# Patient Record
Sex: FEMALE | Race: WHITE | Employment: OTHER | URBAN - METROPOLITAN AREA
[De-identification: names, ages, dates, MRNs, and addresses within clinical notes are randomized per-mention and may not be internally consistent; named-entity substitution may affect disease eponyms.]

---

## 2017-03-08 ENCOUNTER — TELEPHONE (OUTPATIENT)
Dept: FAMILY MEDICINE | Facility: OTHER | Age: 82
End: 2017-03-08

## 2017-03-08 NOTE — TELEPHONE ENCOUNTER
Summary:    Patient is due/failing the following:   FOLLOW UP and PHQ9    Action needed:   Patient needs office visit for follow up. and Patient needs to do PHQ9.    Type of outreach:    Sent letter.    Questions for provider review:    None                                                                                                                                    Talita Montelongo       Chart routed to Care Team .        Panel Management Review      Patient has the following on her problem list:     Depression / Dysthymia review  PHQ-9 SCORE 10/8/2015 9/15/2016 9/29/2016   Total Score - - -   Total Score 11 16 17      Patient is due for:  PHQ9    Hypertension   Last three blood pressure readings:  BP Readings from Last 3 Encounters:   11/29/16 132/70   10/18/16 110/70   09/29/16 126/78     Blood pressure: Passed    HTN Guidelines:  Age 18-59 BP range:  Less than 140/90  Age 60-85 with Diabetes:  Less than 140/90  Age 60-85 without Diabetes:  less than 150/90      Composite cancer screening  Chart review shows that this patient is due/due soon for the following None

## 2017-03-08 NOTE — LETTER
[address]   [facility] [address]  Phone: [phone]  March 8, 2017      Todd Clifford  [address] [address]      Dear Todd,    We care about your health and have reviewed your health plan including your medical conditions, medications, and lab results.  Based on this review, it is recommended that you follow up regarding the following health topic(s):  -Depression    We recommend you take the following action(s):  -schedule a FOLLOWUP APPOINTMENT.  -Complete and return the attached PHQ-9 Form.  If your total score is greater than 9, please schedule a followup appointment.  If you answer Yes to question 9, call your clinic between the hours of 8 to 5.  You may also call the Suicide Hotline at 9-499-154-OBBC ([phone]) any time.     Please call us at the [facility] - [phone] (or use [employer]) to address the above recommendations.     Thank you for trusting [facility] and we appreciate the opportunity to serve you.  We look forward to supporting your healthcare needs in the future.    Healthy Regards,    Your Health Care Team  [facility]

## 2017-04-14 ENCOUNTER — APPOINTMENT (OUTPATIENT)
Dept: GENERAL RADIOLOGY | Facility: CLINIC | Age: 82
End: 2017-04-14
Attending: FAMILY MEDICINE
Payer: MEDICARE

## 2017-04-14 ENCOUNTER — HOSPITAL ENCOUNTER (EMERGENCY)
Facility: CLINIC | Age: 82
Discharge: HOME | End: 2017-04-14
Attending: FAMILY MEDICINE | Admitting: FAMILY MEDICINE
Payer: MEDICARE

## 2017-04-14 VITALS
WEIGHT: 113 LBS | SYSTOLIC BLOOD PRESSURE: 158 MMHG | DIASTOLIC BLOOD PRESSURE: 80 MMHG | HEART RATE: 106 BPM | OXYGEN SATURATION: 94 % | RESPIRATION RATE: 22 BRPM | BODY MASS INDEX: 22.82 KG/M2

## 2017-04-14 DIAGNOSIS — E87.1 CHRONIC HYPONATREMIA: ICD-10-CM

## 2017-04-14 DIAGNOSIS — R60.9 DEPENDENT EDEMA: ICD-10-CM

## 2017-04-14 DIAGNOSIS — E87.1 HYPONATREMIA: ICD-10-CM

## 2017-04-14 DIAGNOSIS — F17.200 TOBACCO DEPENDENCE SYNDROME: ICD-10-CM

## 2017-04-14 DIAGNOSIS — F17.210 CIGARETTE SMOKER: ICD-10-CM

## 2017-04-14 DIAGNOSIS — M54.50 LEFT-SIDED LOW BACK PAIN WITHOUT SCIATICA, UNSPECIFIED CHRONICITY: ICD-10-CM

## 2017-04-14 LAB
ALBUMIN SERPL-MCNC: 3.8 G/DL (ref 3.4–5)
ALP SERPL-CCNC: 155 U/L (ref 40–150)
ALT SERPL W P-5'-P-CCNC: 45 U/L (ref 0–50)
ANION GAP SERPL CALCULATED.3IONS-SCNC: 9 MMOL/L (ref 3–14)
AST SERPL W P-5'-P-CCNC: 33 U/L (ref 0–45)
BASE EXCESS BLDV CALC-SCNC: 2.4 MMOL/L
BASOPHILS # BLD AUTO: 0 10E9/L (ref 0–0.2)
BASOPHILS NFR BLD AUTO: 0.5 %
BILIRUB SERPL-MCNC: 0.4 MG/DL (ref 0.2–1.3)
BUN SERPL-MCNC: 7 MG/DL (ref 7–30)
CALCIUM SERPL-MCNC: 9.2 MG/DL (ref 8.5–10.1)
CHLORIDE SERPL-SCNC: 95 MMOL/L (ref 94–109)
CO2 SERPL-SCNC: 27 MMOL/L (ref 20–32)
CREAT SERPL-MCNC: 0.6 MG/DL (ref 0.52–1.04)
D DIMER PPP FEU-MCNC: 0.5 UG/ML FEU (ref 0–0.5)
DIFFERENTIAL METHOD BLD: NORMAL
EOSINOPHIL # BLD AUTO: 0.1 10E9/L (ref 0–0.7)
EOSINOPHIL NFR BLD AUTO: 1 %
ERYTHROCYTE [DISTWIDTH] IN BLOOD BY AUTOMATED COUNT: 12.6 % (ref 10–15)
GFR SERPL CREATININE-BSD FRML MDRD: ABNORMAL ML/MIN/1.7M2
GLUCOSE SERPL-MCNC: 106 MG/DL (ref 70–99)
HCO3 BLDV-SCNC: 27 MMOL/L (ref 21–28)
HCT VFR BLD AUTO: 39.5 % (ref 35–47)
HGB BLD-MCNC: 13.3 G/DL (ref 11.7–15.7)
IMM GRANULOCYTES # BLD: 0 10E9/L (ref 0–0.4)
IMM GRANULOCYTES NFR BLD: 0.2 %
INR PPP: 0.9 (ref 0.86–1.14)
LIPASE SERPL-CCNC: 98 U/L (ref 73–393)
LYMPHOCYTES # BLD AUTO: 1.3 10E9/L (ref 0.8–5.3)
LYMPHOCYTES NFR BLD AUTO: 21.1 %
MCH RBC QN AUTO: 31.4 PG (ref 26.5–33)
MCHC RBC AUTO-ENTMCNC: 33.7 G/DL (ref 31.5–36.5)
MCV RBC AUTO: 93 FL (ref 78–100)
MONOCYTES # BLD AUTO: 0.7 10E9/L (ref 0–1.3)
MONOCYTES NFR BLD AUTO: 10.9 %
NEUTROPHILS # BLD AUTO: 4 10E9/L (ref 1.6–8.3)
NEUTROPHILS NFR BLD AUTO: 66.3 %
O2/TOTAL GAS SETTING VFR VENT: 21 %
PCO2 BLDV: 39 MM HG (ref 40–50)
PH BLDV: 7.45 PH (ref 7.32–7.43)
PLATELET # BLD AUTO: 319 10E9/L (ref 150–450)
PO2 BLDV: 68 MM HG (ref 25–47)
POTASSIUM SERPL-SCNC: 3.9 MMOL/L (ref 3.4–5.3)
PROT SERPL-MCNC: 7.1 G/DL (ref 6.8–8.8)
RBC # BLD AUTO: 4.23 10E12/L (ref 3.8–5.2)
SODIUM SERPL-SCNC: 131 MMOL/L (ref 133–144)
TROPONIN I SERPL-MCNC: NORMAL UG/L (ref 0–0.04)
TSH SERPL DL<=0.005 MIU/L-ACNC: 0.74 MU/L (ref 0.4–4)
WBC # BLD AUTO: 6.1 10E9/L (ref 4–11)

## 2017-04-14 PROCEDURE — 93005 ELECTROCARDIOGRAM TRACING: CPT | Performed by: FAMILY MEDICINE

## 2017-04-14 PROCEDURE — 85025 COMPLETE CBC W/AUTO DIFF WBC: CPT | Performed by: FAMILY MEDICINE

## 2017-04-14 PROCEDURE — 99284 EMERGENCY DEPT VISIT MOD MDM: CPT | Mod: 25 | Performed by: FAMILY MEDICINE

## 2017-04-14 PROCEDURE — 71020 XR CHEST 2 VW: CPT | Mod: TC

## 2017-04-14 PROCEDURE — 99285 EMERGENCY DEPT VISIT HI MDM: CPT | Mod: 25 | Performed by: FAMILY MEDICINE

## 2017-04-14 PROCEDURE — 83690 ASSAY OF LIPASE: CPT | Performed by: FAMILY MEDICINE

## 2017-04-14 PROCEDURE — 93010 ELECTROCARDIOGRAM REPORT: CPT | Mod: Z6 | Performed by: FAMILY MEDICINE

## 2017-04-14 PROCEDURE — 80053 COMPREHEN METABOLIC PANEL: CPT | Performed by: FAMILY MEDICINE

## 2017-04-14 PROCEDURE — 84443 ASSAY THYROID STIM HORMONE: CPT | Performed by: FAMILY MEDICINE

## 2017-04-14 PROCEDURE — 85379 FIBRIN DEGRADATION QUANT: CPT | Performed by: FAMILY MEDICINE

## 2017-04-14 PROCEDURE — 82803 BLOOD GASES ANY COMBINATION: CPT | Performed by: FAMILY MEDICINE

## 2017-04-14 PROCEDURE — 85610 PROTHROMBIN TIME: CPT | Performed by: FAMILY MEDICINE

## 2017-04-14 PROCEDURE — 84484 ASSAY OF TROPONIN QUANT: CPT | Performed by: FAMILY MEDICINE

## 2017-04-14 RX ORDER — ACETAMINOPHEN 325 MG/1
975 TABLET ORAL ONCE
Status: DISCONTINUED | OUTPATIENT
Start: 2017-04-14 | End: 2017-04-15 | Stop reason: HOSPADM

## 2017-04-14 ASSESSMENT — ENCOUNTER SYMPTOMS
MUSCULOSKELETAL NEGATIVE: 1
NERVOUS/ANXIOUS: 1
PALPITATIONS: 0
FEVER: 0
BLOOD IN STOOL: 0
NEUROLOGICAL NEGATIVE: 1
APPETITE CHANGE: 0
COUGH: 1
CHILLS: 0
DIARRHEA: 0
VOMITING: 0
GASTROINTESTINAL NEGATIVE: 1
ABDOMINAL PAIN: 0
WOUND: 0
WHEEZING: 0
EYES NEGATIVE: 1
STRIDOR: 0
SHORTNESS OF BREATH: 1
ACTIVITY CHANGE: 0
NAUSEA: 0
FATIGUE: 0

## 2017-04-14 NOTE — ED AVS SNAPSHOT
[facility] Emergency Department    [address] [place] [address]    Phone:  [phone]    Fax:  [phone]                                       Todd Clifford   MRN: [id number]    Department:  [facility] Emergency Department   Date of Visit:  4/14/2017           After Visit Summary Signature Page     I have received my discharge instructions, and my questions have been answered. I have discussed any challenges I see with this plan with the nurse or doctor.    ..........................................................................................................................................  Patient/Patient Representative Signature      ..........................................................................................................................................  Patient Representative Print Name and Relationship to Patient    ..................................................               ................................................  Date                                            Time    ..........................................................................................................................................  Reviewed by Signature/Title    ...................................................              ..............................................  Date                                                            Time

## 2017-04-14 NOTE — ED AVS SNAPSHOT
[facility] Emergency Department    [address]     [place] [address]    Phone:  [phone]    Fax:  [phone]                                       Todd Clifford   MRN: [id number]    Department:  [facility] Emergency Department   Date of Visit:  4/14/2017           Patient Information     Date Of Birth          8/30/1934        Your diagnoses for this visit were:     Dependent edema     Chronic hyponatremia     Tobacco dependence syndrome        You were seen by Matry Valenzuela DO.      Follow-up Information     Follow up with Zuly Carrington PA-C In 1 week.    Specialty:  Physician Assistant - Medical    Why:  for recheck    Contact information:    [facility]  [address]  [facility] [address]  [phone]          Follow up with [facility] Emergency Department.    Specialty:  EMERGENCY MEDICINE    Why:  If symptoms worsen    Contact information:    [address]   [place] [address] [phone]    Additional information:    From [address]: Exit at [address] on south side of [place]. Turn right on [id number]. Turn left at stoplight on [address]. [facility] will be in view two blocks ahead        Discharge Instructions       Please read and follow the handout(s) instructions. Return, if needed, for increased or worsening symptoms and as directed by the handout(s).    Elevate your feet for an hour if you note the swelling in the feet return.    It was nice meeting you. I am glad your swelling improved. Please stop smoking!!    Electronically signed, Marty Valenzuela DO      Discharge References/Attachments     COPING WITH EDEMA (ENGLISH)    SMOKING CESSATION (ENGLISH)      24 Hour Appointment Hotline       To make an appointment at any [facility], call [phone] ([phone]). If you don't have a family doctor or clinic, we will help you find one. [facility] are  conveniently located to serve the needs of you and your family.             Review of your medicines      CONTINUE these medicines which may have CHANGED, or have new prescriptions. If we are uncertain of the size of tablets/capsules you have at home, strength may be listed as something that might have changed.        Dose / Directions Last dose taken    amitriptyline 25 MG tablet   Commonly known as:  ELAVIL   What changed:    - how much to take  - how to take this  - when to take this  - additional instructions   Quantity:  90 tablet        Take 1 tablet (25 mg) by mouth in the morning and 2 tablets (50 mg) by mouth in the evening.   Refills:  3          Our records show that you are taking the medicines listed below. If these are incorrect, please call your family doctor or clinic.        Dose / Directions Last dose taken    ALPRAZolam 0.25 MG tablet   Commonly known as:  XANAX   Quantity:  60 tablet        TAKE ONE-HALF TO ONE TABLET BY MOUTH EVERY 6 HOURS AS NEEDED. MAX OF 2.5 PER 24HRS   Refills:  5        lisinopril 20 MG tablet   Commonly known as:  PRINIVIL/ZESTRIL   Dose:  20 mg   Quantity:  90 tablet        Take 1 tablet (20 mg) by mouth daily   Refills:  3        melatonin 5 MG tablet   Commonly known as:  MELATONIN MAXIMUM STRENGTH   Dose:  5 mg   Quantity:  90 tablet        Take 1 tablet (5 mg) by mouth nightly as needed for sleep   Refills:  3        senna 8.6 MG tablet   Commonly known as:  SENOKOT   Dose:  1 tablet   Quantity:  90 tablet        Take 1 tablet by mouth daily   Refills:  0                Procedures and tests performed during your visit     Blood gas venous    CBC with platelets differential    Comprehensive metabolic panel    D dimer quantitative    EKG 12-lead, tracing only    INR    Lipase    TSH with free T4 reflex    Troponin I    XR Chest 2 Views      Orders Needing Specimen Collection     Ordered          04/14/17 1959  UA reflex to Microscopic and Culture - STAT, [facility]: STAT,  "Needs to be Collected     Scheduled Task Status   17 Collect UA reflex to Microscopic and Culture Open   Order Class:  PCU Collect                  Pending Results     Date and Time Order Name Status Description    2017 XR Chest 2 Views Preliminary             Pending Culture Results     No orders found from 2017 to 4/15/2017.            Thank you for choosing [facility]       Thank you for choosing [facility] for your care. Our goal is always to provide you with excellent care. Hearing back from our patients is one way we can continue to improve our services. Please take a few minutes to complete the written survey that you may receive in the mail after you visit with us. Thank you!        [facility] [employer] Information     [facility] lets you send messages to your doctor, view your test results, renew your prescriptions, schedule appointments and more. To sign up, go to [email or web address] . Click on \"Log in\" on the left side of the screen, which will take you to the Welcome page. Then click on \"Sign up Now\" on the right side of the page.     You will be asked to enter the access code listed below, as well as some personal information. Please follow the directions to create your username and password.     Your access code is: [email or web address]  Expires: 2017 10:03 PM     Your access code will  in 90 days. If you need help or a new code, please call your [facility] clinic or [phone].        Care EveryWhere ID     This is your Care EveryWhere ID. This could be used by other organizations to access your [facility] medical records  [id number]        After Visit Summary       This is your record. Keep this with you and show to your community pharmacist(s) and doctor(s) at your next visit.                  "

## 2017-04-15 NOTE — ED PROVIDER NOTES
History     Chief Complaint   Patient presents with     Leg Swelling     Back Pain     HPI  Todd Clifford is a 82 year old female who presents to the ER with concerns about bilateral lower leg swelling. She states that she noted the swelling this afternoon. Her daughter called 911 and she was evaluated by EMS who did not transport her to the ER but told her to come the ER to get checked out as they thought her lungs sounded congested. She states she feels fine other than a strain of her low back that happened about 1 week ago to the right lower back when attempting to move her sewing machine to clean the window behind it. She is a smoker and has been since 18 yrs of age. She denies significant worsening of her chronic SOB and denies chest pain at this time. She denies abdominal pains. She states she has gained about 15 lbs since being told to increase her fluid intake to 4 pints a day and to drink 3 Ensure shakes a day by her clinic provider.       I have reviewed the Medications, Allergies, Past Medical and Surgical History, and Social History in the Epic system.  Patient Active Problem List   Diagnosis     Anxiety     CARDIOVASCULAR SCREENING; LDL GOAL LESS THAN 130     Advanced directives, counseling/discussion     Hypertension goal BP (blood pressure) < 140/80     Mild major depression (H)     Chronic constipation     Psychophysiological insomnia       Past Medical History:   Diagnosis Date     Anxiety      Hearing loss      Hypertension         History reviewed. No pertinent surgical history.    Family History   Problem Relation Age of Onset     C.A.D. Mother      C.A.D. Father      Hypertension Mother      Hypertension Father      Hypertension Brother      DIABETES Daughter        Social History     Social History     Marital status:      Spouse name: N/A     Number of children: N/A     Years of education: N/A     Occupational History     Not on file.     Social History Main Topics     Smoking  "status: Current Every Day Smoker     Packs/day: 1.00     Types: Cigarettes     Smokeless tobacco: Never Used     Alcohol use No     Drug use: No     Sexual activity: Not Currently     Partners: Male     Other Topics Concern     Not on file     Social History Narrative       Current Outpatient Rx   Medication Sig Dispense Refill     amitriptyline (ELAVIL) 25 MG tablet Take 1 tablet (25 mg) by mouth in the morning and 2 tablets (50 mg) by mouth in the evening. (Patient taking differently: Take 25 mg by mouth 2 times daily Take 1 tablet (25 mg) by mouth in the morning and 2 tablets (50 mg) by mouth in the evening.) 90 tablet 3     ALPRAZolam (XANAX) 0.25 MG tablet TAKE ONE-HALF TO ONE TABLET BY MOUTH EVERY 6 HOURS AS NEEDED. MAX OF 2.5 PER 24HRS 60 tablet 5     lisinopril (PRINIVIL,ZESTRIL) 20 MG tablet Take 1 tablet (20 mg) by mouth daily 90 tablet 3     melatonin (MELATONIN MAXIMUM STRENGTH) 5 MG tablet Take 1 tablet (5 mg) by mouth nightly as needed for sleep 90 tablet 3     senna (SENOKOT) 8.6 MG tablet Take 1 tablet by mouth daily 90 tablet 0       Allergies   Allergen Reactions     Prozac [Fluoxetine]      \"roaring in my head\"   nervous         There is no immunization history on file for this patient.    Review of Systems   Constitutional: Negative for activity change, appetite change, chills, fatigue and fever.   HENT: Negative.    Eyes: Negative.    Respiratory: Positive for cough (chronic with smoking) and shortness of breath (Chronic but not worse than usual.). Negative for wheezing and stridor.    Cardiovascular: Positive for leg swelling (Equal bilateral lower leg swelling.). Negative for chest pain and palpitations.   Gastrointestinal: Negative.  Negative for abdominal pain, blood in stool, diarrhea, nausea and vomiting.   Genitourinary: Negative.    Musculoskeletal: Negative.    Skin: Negative for rash and wound.   Neurological: Negative.    Psychiatric/Behavioral: The patient is nervous/anxious (States " she is usually anxiouos about things.).    All other systems reviewed and are negative.      Physical Exam   BP: (!) 172/100  Pulse: 108  Resp: 20  Weight: 51.3 kg (113 lb)  SpO2: 90 %  Physical Exam   Constitutional: She is oriented to person, place, and time. She appears well-developed and well-nourished. No distress.   Smells of cigarette smoke.     HENT:   Head: Normocephalic and atraumatic.   Eyes: Conjunctivae and EOM are normal. Pupils are equal, round, and reactive to light.   Neck: Normal range of motion. Neck supple. No JVD present.   Cardiovascular: Regular rhythm and intact distal pulses.  Frequent extrasystoles are present. Exam reveals distant heart sounds.    No murmur heard.  Pulses:       Radial pulses are 2+ on the right side, and 2+ on the left side.        Dorsalis pedis pulses are 1+ on the right side, and 1+ on the left side.   Bilateral non-pitting lower leg swelling Grade 1-2/4   Pulmonary/Chest: No accessory muscle usage. No respiratory distress. She has decreased breath sounds in the right middle field, the right lower field, the left middle field and the left lower field. She has no wheezes. She has no rhonchi. She has no rales.   Abdominal: Soft. Bowel sounds are normal. She exhibits no ascites and no pulsatile midline mass. There is no tenderness.   Musculoskeletal: Normal range of motion.        Lumbar back: She exhibits tenderness and spasm. She exhibits no bony tenderness and normal pulse.        Back:    Neurological: She is alert and oriented to person, place, and time. She has normal strength. She displays a negative Romberg sign.   Skin: Skin is warm and intact. She is not diaphoretic.   Loss of hair to the lower leg skin bilaterally.    Nursing note and vitals reviewed.      ED Course     ED Course     Procedures             EKG Interpretation:      Interpreted by Marty Valenzuela  Time reviewed: 20:25  Symptoms at time of EKG: Lower leg swelling   Rhythm: sinus  tachycardia  Rate: 100-110  Axis: Normal  Ectopy: none  Conduction: normal  ST Segments/ T Waves: No ST-T wave changes  Q Waves: none  Comparison to prior: No old EKG available    Clinical Impression: sinus tachycardia      Critical Care time:  none               Results for orders placed or performed during the hospital encounter of 04/14/17   XR Chest 2 Views    Narrative    CHEST TWO VIEWS  4/14/2017  9:01 PM      HISTORY: Edema.     COMPARISON: None.    FINDINGS: The heart size is normal. The thoracic aorta is calcified.  There is mild infiltrate in the left upper lobe. The lungs are  otherwise clear. No pneumothorax or pleural effusion. There are 2  midthoracic vertebral body compression fractures.      Impression    IMPRESSION: Mild left upper lobe infiltrate may be pneumonia.    KAREN TREVIZO MD   CBC with platelets differential   Result Value Ref Range    WBC 6.1 4.0 - 11.0 10e9/L    RBC Count 4.23 3.8 - 5.2 10e12/L    Hemoglobin 13.3 11.7 - 15.7 g/dL    Hematocrit 39.5 35.0 - 47.0 %    MCV 93 78 - 100 fl    MCH 31.4 26.5 - 33.0 pg    MCHC 33.7 31.5 - 36.5 g/dL    RDW 12.6 10.0 - 15.0 %    Platelet Count 319 150 - 450 10e9/L    Diff Method Automated Method     % Neutrophils 66.3 %    % Lymphocytes 21.1 %    % Monocytes 10.9 %    % Eosinophils 1.0 %    % Basophils 0.5 %    % Immature Granulocytes 0.2 %    Absolute Neutrophil 4.0 1.6 - 8.3 10e9/L    Absolute Lymphocytes 1.3 0.8 - 5.3 10e9/L    Absolute Monocytes 0.7 0.0 - 1.3 10e9/L    Absolute Eosinophils 0.1 0.0 - 0.7 10e9/L    Absolute Basophils 0.0 0.0 - 0.2 10e9/L    Abs Immature Granulocytes 0.0 0 - 0.4 10e9/L   Comprehensive metabolic panel   Result Value Ref Range    Sodium 131 (L) 133 - 144 mmol/L    Potassium 3.9 3.4 - 5.3 mmol/L    Chloride 95 94 - 109 mmol/L    Carbon Dioxide 27 20 - 32 mmol/L    Anion Gap 9 3 - 14 mmol/L    Glucose 106 (H) 70 - 99 mg/dL    Urea Nitrogen 7 7 - 30 mg/dL    Creatinine 0.60 0.52 - 1.04 mg/dL    GFR Estimate >90  Non   GFR Calc   >60 mL/min/1.7m2    GFR Estimate If Black >90   GFR Calc   >60 mL/min/1.7m2    Calcium 9.2 8.5 - 10.1 mg/dL    Bilirubin Total 0.4 0.2 - 1.3 mg/dL    Albumin 3.8 3.4 - 5.0 g/dL    Protein Total 7.1 6.8 - 8.8 g/dL    Alkaline Phosphatase 155 (H) 40 - 150 U/L    ALT 45 0 - 50 U/L    AST 33 0 - 45 U/L   Lipase   Result Value Ref Range    Lipase 98 73 - 393 U/L   TSH with free T4 reflex   Result Value Ref Range    TSH 0.74 0.40 - 4.00 mU/L   Blood gas venous   Result Value Ref Range    Ph Venous 7.45 (H) 7.32 - 7.43 pH    PCO2 Venous 39 (L) 40 - 50 mm Hg    PO2 Venous 68 (H) 25 - 47 mm Hg    Bicarbonate Venous 27 21 - 28 mmol/L    Base Excess Venous 2.4 mmol/L    FIO2 21    Troponin I   Result Value Ref Range    Troponin I ES  0.000 - 0.045 ug/L     <0.015  The 99th percentile for upper reference range is 0.045 ug/L.  Troponin values in   the range of 0.045 - 0.120 ug/L may be associated with risks of adverse   clinical events.     D dimer quantitative   Result Value Ref Range    D Dimer 0.5 0.0 - 0.50 ug/ml FEU   INR   Result Value Ref Range    INR 0.90 0.86 - 1.14     Medications ordered to be administered in the ER:    Medications - No data to display      Assessments & Plan (with Medical Decision Making)  82-year-old female to the emergency room with concerns about swelling in her lower legs bilaterally.  Patient known to be hypertensive and with a history for fall 1 week ago causing pain to her right buttock region.  Physical exam revealed no evidence suggestive of a DVT.  She had excellent pulses in the dorsalis pedis and posterior tibial areas bilaterally.  There is no erythema associated with her swelling.  Patient had her legs elevated to the ER stay and actually had resolution of her swelling symptoms on reexamination.  Screening metabolic testing was performed and improved remarkable for hyponatremia however this appeared to be a chronic nature and not acute.  EKG  and d-dimer unremarkable for abnormality.  Patient was feeling improved and desired to be discharged home.  When she got up off the cart he had's tenderness in her right buttock region this was treated with some acetaminophen.  She is instructed the use of ice therapy and gentle stretching to the buttock region as well as instructed to elevate her legs at times should she notice any swelling.  To avoid long periods of standing or sitting.  Swelling tonight appears to be secondary to dependent edema.       I have reviewed the nursing notes.    I have reviewed the findings, diagnosis, plan and need for follow up with the patient's daughter.    Discharge Medication List as of 4/14/2017 10:04 PM             I verbally discussed the findings of the evaluation today in the ER. I have verbally discussed with Todd the suggested treatment(s) as described in the discharge instructions and handouts. I have prescribed the above listed medications and instructed her on appropriate use of these medications.      I have verbally suggested she follow-up in her clinic or return to the ER for increased symptoms. See the follow-up recommendations documented  in the after visit summary in this visit's EPIC chart.      Final diagnoses:   Dependent edema   Chronic hyponatremia   Tobacco dependence syndrome       4/14/2017   [facility] EMERGENCY DEPARTMENT     Marty Valenzuela,   04/15/17 0319

## 2017-04-15 NOTE — DISCHARGE INSTRUCTIONS
Please read and follow the handout(s) instructions. Return, if needed, for increased or worsening symptoms and as directed by the handout(s).    Elevate your feet for an hour if you note the swelling in the feet return.    It was nice meeting you. I am glad your swelling improved. Please stop smoking!!    Electronically signed, Marty Valenzuela DO

## 2017-04-15 NOTE — ED NOTES
Pt given discharge instructions.  Nurse stood pt up and started ambulating.  Pt states left sided back and leg pain worse.  Would like some tylenol before leaving.  Md aware.

## 2017-04-15 NOTE — ED NOTES
"Pt denies being able to provide urine sample.  States \"I went 9 times in a row, and now wouldn't ya know I cant go\".  Pt given hot pack for lower back, upper buttock area.  States \"Gosh that feels good\".   "

## 2017-04-20 ENCOUNTER — TELEPHONE (OUTPATIENT)
Dept: FAMILY MEDICINE | Facility: OTHER | Age: 82
End: 2017-04-20

## 2017-04-20 DIAGNOSIS — I10 HYPERTENSION GOAL BP (BLOOD PRESSURE) < 140/80: ICD-10-CM

## 2017-04-20 RX ORDER — LISINOPRIL 20 MG/1
20 TABLET ORAL DAILY
Qty: 90 TABLET | Refills: 3 | Status: CANCELLED | OUTPATIENT
Start: 2017-04-20

## 2017-04-20 NOTE — TELEPHONE ENCOUNTER
lisinopril (PRINIVIL,ZESTRIL) 20 MG tablet      Last Written Prescription Date: 9/15/16  Last Fill Quantity: 90, # refills: 3  Last Office Visit with G, P or [facility] prescribing provider: 11/29/16       Potassium   Date Value Ref Range Status   04/14/2017 3.9 3.4 - 5.3 mmol/L Final     Creatinine   Date Value Ref Range Status   04/14/2017 0.60 0.52 - 1.04 mg/dL Final     BP Readings from Last 3 Encounters:   04/14/17 158/80   11/29/16 132/70   10/18/16 110/70

## 2017-04-20 NOTE — TELEPHONE ENCOUNTER
Reason for call:  Symptom  Reason for call:  Patient reporting a symptom    Symptom or request: bilateral foot swelling    Duration (how long have symptoms been present): since last sunday    Have you been treated for this before? Yes    Additional comments: pt was seen in the emergency room and had clear lunf and foot xrays.  She states she has no other sx but the swelling but is concerned that it hasnt resolved.  She would like to know what fidel thinks she should do.  Please call to advise.  steven    Phone Number patient can be reached at:  Home number on file [phone] (home)    Best Time:  any    Can we leave a detailed message on this number:  YES    Call taken on 4/20/2017 at 4:26 PM by Jenae Dominguez

## 2017-04-20 NOTE — TELEPHONE ENCOUNTER
Todd Clifford is a 82 year old female    S-(situation): Todd is calling today with back pain    B-(background): seen in ED, leg swelling. Low back pain. Negative testing. Blood pressure has been high.     A-(assessment): feet swelling, both. Gets better with elevation. Cough present on phone. Patient does not want to be seen. Refusing to be seen in clinic    Pain scale (1-10): 0/10   Denies: Sob, chest pain, fever, hot to touch, redness   Meds/MeasuresTried: n/a   Improves/Worsens: Elevation helps       R-(recommendations): See in 24 hours - patient is refusing to be seen. Notified in order to assess her, she needs an appointment. States she will call back.   Will comply with recommendation: YES     If further questions/concerns or if Sxs do not improve, worsen or new Sxs develop, call your PCP or [facility] as soon as possible.    Guideline used:  Telephone Triage Protocols for Nurses, Fourth Edition, Alivia Hoskins  Leg pain/swelling  Foot problems    NOTES:  Disposition was determined by the first positive assessment question, therefore all previous assessment questions were negative      Jolynn Sylvester, RN, BSN

## 2017-04-24 NOTE — PROGRESS NOTES
SUBJECTIVE:                                                    Todd Clifford is a 82 year old female who presents to clinic today for the following health issues:      HPI     ED/UC Followup:    Facility: [facility]   Date of visit: 4/14/17  Reason for visit: Bilateral leg swelling   Current Status: Mildly improved     Pt also states having back and hip pain, then the legs started to swell  Right leg  walking is painful, but sitting does not bother leg/hip  Cant lay down has to sit up to sleep  Taking tylenol, would like to know if she can take something else.     - Sitting on hard chair helps  - Sitting of sofa hurts  - >10 feet if walking, hurts   - Elavil - 1 in am and 1 in pm, 1/2 xanax in am and 1 in PM   - Decreased because tired all the time   - Went to move a rug to clean and felt something pull     Hurts all the way to her foot with certain movements          ED Discharge note:   82-year-old female to the emergency room with concerns about swelling in her lower legs bilaterally. Patient known to be hypertensive and with a history for fall 1 week ago causing pain to her right buttock region. Physical exam revealed no evidence suggestive of a DVT. She had excellent pulses in the dorsalis pedis and posterior tibial areas bilaterally. There is no erythema associated with her swelling. Patient had her legs elevated to the ER stay and actually had resolution of her swelling symptoms on reexamination. Screening metabolic testing was performed and improved remarkable for hyponatremia however this appeared to be a chronic nature and not acute. EKG and d-dimer unremarkable for abnormality. Patient was feeling improved and desired to be discharged home. When she got up off the cart he had's tenderness in her right buttock region this was treated with some acetaminophen. She is instructed the use of ice therapy and gentle stretching to the buttock region as well as instructed to elevate her legs at  times should she notice any swelling. To avoid long periods of standing or sitting. Swelling tonight appears to be secondary to dependent edema.       Problem list and histories reviewed & adjusted, as indicated.  Additional history: as documented    BP Readings from Last 3 Encounters:   04/14/17 158/80   11/29/16 132/70   10/18/16 110/70    Wt Readings from Last 3 Encounters:   04/14/17 113 lb (51.3 kg)   11/29/16 101 lb 9.6 oz (46.1 kg)   09/29/16 100 lb 6.4 oz (45.5 kg)            Labs reviewed in EPIC    ROS:  Constitutional, HEENT, cardiovascular, pulmonary, gi and gu systems are negative, except as otherwise noted.    OBJECTIVE:                                                    BP (!) 171/92 (BP Location: Left arm, Patient Position: Chair, Cuff Size: Adult Regular)  Pulse 109  Temp 98.5  F (36.9  C) (Oral)  Resp 16  Wt 109 lb (49.4 kg)  SpO2 94%  BMI 22.02 kg/m2  Body mass index is 22.02 kg/(m^2).  GENERAL APPEARANCE: healthy, alert and no distress  EYES: Eyes grossly normal to inspection, PERRLA, conjunctivae and sclerae without injection or discharge, EOM intact   RESP: Lungs clear to auscultation - no rales, rhonchi or wheezes   CV: Regular rates and rhythm, normal S1 S2, no S3 or S4, no murmur, click or rub, no peripheral edema and peripheral pulses strong and symmetric bilaterally   MS: No musculoskeletal defects are noted and gait is age appropriate without ataxia      Right hip: Normal ROM, non-tender, no edema, CMS intact, normal sensory exam, normal strength      Right knee: Normal ROM, non-tender, no edema, CMS intact, normal sensory exam, normal strength  SKIN: No suspicious lesions or rashes, hydration status appears adeuqate with normal skin turgor   NEURO: Strength 5+ bilateral lower extremities, sensation intact in distal bilateral lower extremities, mentation- intact, speech- normal,   BACK: No CVA tenderness, mild right paralumbar tenderness with spasm in right glute, no midline  tenderness, normal ROM, positive straight leg raise at ~30 degrees   PSYCH: Alert and oriented x3; speech- coherent , normal rate and volume; able to articulate logical thoughts, able to abstract reason, no tangential thoughts, no hallucinations or delusions, mentation appears normal, Mood is euthymic. Affect is appropriate for this mood state and bright. Thought content is free of suicidal ideation, hallucinations, and delusions. Dress is adequate and upkept. Eye contact is good during conversation.       Diagnostic Test Results:  none      ASSESSMENT/PLAN:                                                        ICD-10-CM    1. Acute bilateral low back pain with right-sided sciatica M54.41 naproxen (NAPROSYN) 500 MG tablet     cyclobenzaprine (FLEXERIL) 5 MG tablet     DISCONTINUED: naproxen (NAPROSYN) 500 MG tablet     DISCONTINUED: cyclobenzaprine (FLEXERIL) 5 MG tablet   2. Hypertension goal BP (blood pressure) < 140/80 I10 lisinopril (PRINIVIL/ZESTRIL) 20 MG tablet   3. Psychophysiological insomnia F51.04 amitriptyline (ELAVIL) 25 MG tablet   4. Major depressive disorder, recurrent episode, mild (H) F33.0 amitriptyline (ELAVIL) 25 MG tablet   5. Anxiety F41.9 amitriptyline (ELAVIL) 25 MG tablet     - Symptoms originating in back, discussed sciatic pain   - Discussed conservative care with stretches, NSAIDs, Flexeril, and heat      Hand out given   - Discussed naproxen use and side effects  - Discussed flexeril use and side effects, start with 1/2 tablet will cause sedation, try to avoid use of XANAX with this medication, take first and then if still can't sleep in 1 hour could try adding her 1/2 tablet Xanax   - Discussed if doesn't improve in 2-4 weeks, need to consider physical therapy   - Avoid sitting too long, move around and stretch     - Refilled rest of medications as needed     The patient indicates understanding of these issues and agrees with the plan.    Follow up: MULUGETA OSPINA  TELMA Carrington  [facility]

## 2017-04-27 ENCOUNTER — OFFICE VISIT (OUTPATIENT)
Dept: FAMILY MEDICINE | Facility: OTHER | Age: 82
End: 2017-04-27
Payer: MEDICARE

## 2017-04-27 VITALS
DIASTOLIC BLOOD PRESSURE: 92 MMHG | BODY MASS INDEX: 22.02 KG/M2 | SYSTOLIC BLOOD PRESSURE: 171 MMHG | HEART RATE: 109 BPM | WEIGHT: 109 LBS | TEMPERATURE: 98.5 F | RESPIRATION RATE: 16 BRPM | OXYGEN SATURATION: 94 %

## 2017-04-27 DIAGNOSIS — F41.9 ANXIETY: ICD-10-CM

## 2017-04-27 DIAGNOSIS — F33.0 MAJOR DEPRESSIVE DISORDER, RECURRENT EPISODE, MILD (H): ICD-10-CM

## 2017-04-27 DIAGNOSIS — F51.04 PSYCHOPHYSIOLOGICAL INSOMNIA: ICD-10-CM

## 2017-04-27 DIAGNOSIS — I10 HYPERTENSION GOAL BP (BLOOD PRESSURE) < 140/80: ICD-10-CM

## 2017-04-27 DIAGNOSIS — M54.41 ACUTE BILATERAL LOW BACK PAIN WITH RIGHT-SIDED SCIATICA: Primary | ICD-10-CM

## 2017-04-27 PROCEDURE — 99214 OFFICE O/P EST MOD 30 MIN: CPT | Performed by: PHYSICIAN ASSISTANT

## 2017-04-27 RX ORDER — LISINOPRIL 20 MG/1
20 TABLET ORAL DAILY
Qty: 90 TABLET | Refills: 3 | Status: SHIPPED | OUTPATIENT
Start: 2017-04-27 | End: 2018-04-17

## 2017-04-27 RX ORDER — CYCLOBENZAPRINE HCL 5 MG
2.5-5 TABLET ORAL
Qty: 30 TABLET | Refills: 0 | Status: SHIPPED | OUTPATIENT
Start: 2017-04-27 | End: 2017-09-07

## 2017-04-27 RX ORDER — CYCLOBENZAPRINE HCL 5 MG
2.5-5 TABLET ORAL
Qty: 30 TABLET | Refills: 0 | Status: SHIPPED | OUTPATIENT
Start: 2017-04-27 | End: 2017-04-27

## 2017-04-27 RX ORDER — NAPROXEN 500 MG/1
500 TABLET ORAL 2 TIMES DAILY PRN
Qty: 60 TABLET | Refills: 1 | Status: SHIPPED | OUTPATIENT
Start: 2017-04-27 | End: 2017-04-27

## 2017-04-27 RX ORDER — NAPROXEN 500 MG/1
500 TABLET ORAL 2 TIMES DAILY PRN
Qty: 60 TABLET | Refills: 1 | Status: SHIPPED | OUTPATIENT
Start: 2017-04-27 | End: 2017-09-07

## 2017-04-27 NOTE — LETTER
My Depression Action Plan  Name: Todd Clifford   Date of Birth 8/30/1934  Date: 4/24/2017    My doctor: Zuly Carrington   My clinic: [address]  [facility] [address]  [phone]          GREEN    ZONE   Good Control    What it looks like:     Things are going generally well. You have normal up s and down s. You may even feel depressed from time to time, but bad moods usually last less than a day.   What you need to do:  1. Continue to care for yourself (see self care plan)  2. Check your depression survival kit and update it as needed  3. Follow your physician s recommendations including any medication.  4. Do not stop taking medication unless you consult with your physician first.           YELLOW         ZONE Getting Worse    What it looks like:     Depression is starting to interfere with your life.     It may be hard to get out of bed; you may be starting to isolate yourself from others.    Symptoms of depression are starting to last most all day and this has happened for several days.     You may have suicidal thoughts but they are not constant.   What you need to do:     1. Call your care team, your response to treatment will improve if you keep your care team informed of your progress. Yellow periods are signs an adjustment may need to be made.     2. Continue your self-care, even if you have to fake it!    3. Talk to someone in your support network    4. Open up your depression survival kit           RED    ZONE Medical Alert - Get Help    What it looks like:     Depression is seriously interfering with your life.     You may experience these or other symptoms: You can t get out of bed most days, can t work or engage in other necessary activities, you have trouble taking care of basic hygiene, or basic responsibilities, thoughts of suicide or death that will not go away, self-injurious behavior.     What you need to do:  1. Call  your care team and request a same-day appointment. If they are not available (weekends or after hours) call your local crisis line, emergency room or 911.      Electronically signed by: Evelia Perry, April 24, 2017    Depression Self Care Plan / Survival Kit    Self-Care for Depression  Here s the deal. Your body and mind are really not as separate as most people think.  What you do and think affects how you feel and how you feel influences what you do and think. This means if you do things that people who feel good do, it will help you feel better.  Sometimes this is all it takes.  There is also a place for medication and therapy depending on how severe your depression is, so be sure to consult with your medical provider and/ or Behavioral Health Consultant if your symptoms are worsening or not improving.     In order to better manage my stress, I will:    Exercise  Get some form of exercise, every day. This will help reduce pain and release endorphins, the  feel good  chemicals in your brain. This is almost as good as taking antidepressants!  This is not the same as joining a gym and then never going! (they count on that by the way ) It can be as simple as just going for a walk or doing some gardening, anything that will get you moving.      Hygiene   Maintain good hygiene (Get out of bed in the morning, Make your bed, Brush your teeth, Take a shower, and Get dressed like you were going to work, even if you are unemployed).  If your clothes don't fit try to get ones that do.    Diet  I will strive to eat foods that are good for me, drink plenty of water, and avoid excessive sugar, caffeine, alcohol, and other mood-altering substances.  Some foods that are helpful in depression are: complex carbohydrates, B vitamins, flaxseed, fish or fish oil, fresh fruits and vegetables.    Psychotherapy  I agree to participate in Individual Therapy (if recommended).    Medication  If prescribed medications, I agree to take  them.  Missing doses can result in serious side effects.  I understand that drinking alcohol, or other illicit drug use, may cause potential side effects.  I will not stop my medication abruptly without first discussing it with my provider.    Staying Connected With Others  I will stay in touch with my friends, family members, and my primary care provider/team.    Use your imagination  Be creative.  We all have a creative side; it doesn t matter if it s oil painting, sand castles, or mud pies! This will also kick up the endorphins.    Witness Beauty  (AKA stop and smell the roses) Take a look outside, even in mid-winter. Notice colors, textures. Watch the squirrels and birds.     Service to others  Be of service to others.  There is always someone else in need.  By helping others we can  get out of ourselves  and remember the really important things.  This also provides opportunities for practicing all the other parts of the program.    Humor  Laugh and be silly!  Adjust your TV habits for less news and crime-drama and more comedy.    Control your stress  Try breathing deep, massage therapy, biofeedback, and meditation. Find time to relax each day.     My support system    Clinic Contact:  Phone number:    Contact 1:  Phone number:    Contact 2:  Phone number:    [facility]/:  Phone number:    Therapist:  Phone number:    Local crisis center:    Phone number:    Other community support:  Phone number:

## 2017-04-27 NOTE — PATIENT INSTRUCTIONS
Acute Back/Neck Pain: Self Care at Home Instructions  Conservative Treatment    Remaining active supports a quicker recovery, keep moving. (ex. Walking, increase time & speed as tolerated).    Bed rest is not recommended. Minimize sitting. Do not remain in one position for extended periods of time.    Maintain routine activity with attention to correct posture; stop aggravating activities.    Over-the-counter pain medications for short term symptom control. (See below)    Muscle relaxants are sometimes helpful for few days, but may cause drowsiness. (See below)    Heat packs - 20 minute interval     Most people improve within 2 weeks; most have significant improvement within 4 weeks.    If symptoms worsen or you experience numbness, contact your provider immediately.    Medications for Pain Relief  Recommended over-the-counter non-steroidal anti-inflammatories:     Naproxen Sodium (Aleve) 220-440 mg. two times per day as needed for pain    If you have been prescribed a muscle relaxant (*cyclobenzapine 5 mg.)  Take    tablet at bedtime  *May cause drowsiness. Do not drive when taking this medication.                 Low Back Pain            What is low back pain?   Low back pain is pain and stiffness in the lower back. It is one of the most common reasons people miss work.   How does it occur?   Your lower back is called your lumbar spine. It is made up of 5 bones called lumbar vertebrae. In between the vertebrae are shock absorbers called disks. Back pain can occur from an injury to the vertebrae or when a disk bulges or herniates.   Low back pain is usually caused when a ligament or muscle holding a vertebra in its proper position is strained. Vertebrae are bones that make up the spinal column through which the spinal cord passes. When these muscles or ligaments become weak or strained, the spine loses its stability, resulting in pain.   Low back pain can occur if your job involves lifting and  carrying heavy objects, or if you spend a lot of time sitting or standing in one position or bending over. It can be caused by a fall or by unusually strenuous exercise. It can be brought on by the tension and stress that cause headaches in some people. It can even be brought on by violent sneezing or coughing.   People who are overweight may have low back pain because of the added stress on their back.   Back pain may occur when the muscles, joints, bones, and connective tissues of the back become inflamed as a result of an infection or an immune system problem. Arthritic disorders as well as some congenital and degenerative conditions may cause back pain.   Back pain accompanied by loss of bladder or bowel control, trouble moving your legs, or numbness or tingling in your arms or legs requires immediate medical treatment.   What are the symptoms?   Symptoms include:   pain in the back or legs   stiffness, spasm, or limited motion   The pain may be constant or may happen only in certain positions. It may get worse when you cough, sneeze, bend, twist, or strain during a bowel movement. The pain may be in only one spot or may spread to other areas, most commonly down the buttocks and into the back of the thigh.   A low back strain typically does not produce pain past the knee into the calf or foot. Tingling or numbness in the calf or foot may indicate a herniated disk or pinched nerve.   Be sure to see your healthcare provider if:   You have weakness in your leg, especially if you cannot lift your foot, because this may be a sign of nerve damage.   You have new bowel or bladder problems as well as back pain, which may be a sign of severe injury to your spinal cord.   You have pain that gets worse despite treatment.   How is it diagnosed?   Your healthcare provider will review your medical history and examine you. You may have X-rays, an MRI, CT scan, or a bone scan.   How is it treated?   To treat this condition:    Put an ice pack, gel pack, or package of frozen vegetables, wrapped in a cloth on the area every 3 to 4 hours, for up to 20 minutes at a time for the first 2 or 3 days.   Use a heating pad or hot water bottle. Don't let the heating pad get too hot, and don't fall asleep with it. You could get a burn.   Rest in bed on a firm mattress. Often it helps to lie on your back with your knees raised on a pillow. However, some people prefer to lie on their side with their knees bent. It's best to try to stay active, so try not to rest in bed longer than 1 to 2 days.   Take muscle relaxants as recommended by your healthcare provider.   Take an anti-inflammatory such as ibuprofen, or other medicine as directed by your provider. Nonsteroidal anti-inflammatory medicines (NSAIDs) may cause stomach bleeding and other problems. These risks increase with age. Read the label and take as directed. Unless recommended by your healthcare provider, do not take for more than 10 days.   Get a back massage by a trained person.   Wear a belt or corset to support your back.   Do the exercises recommended by your provider. Your provider may also prescribe physical therapy.   Talk with a counselor, if your back pain is related to tension caused by emotional problems.   When the pain is gone, ask your healthcare provider about starting an exercise program such as the following:   Exercise moderately every day, using stretching and warm-up exercises suggested by your provider or physical therapist.   Exercise vigorously for about 30 minutes 3 times a week by walking, swimming, using a stationary bicycle, or doing low-impact aerobics.   Exercising regularly will not only help your back, it will also help keep you healthier overall.   How long will the effects last?   The effects of back pain last as long as the cause exists or until your body recovers from the strain, usually a day or two but sometimes weeks.   How can I take care of myself?   In  addition to the treatment described above, keep in mind these suggestions:   Practice good posture. Stand with your head up, shoulders straight, chest forward, weight balanced evenly on both feet, and pelvis tucked in.   Lose weight if you are overweight   Keep your core muscles strong. These are your abdominal and back muscles.   Sleep without a pillow under your head.   Pain is the best way to  the pace you should set in increasing your activity and exercise. Minor discomfort, stiffness, soreness, and mild aches need not interfere with activity. However, limit your activities temporarily if:   Your symptoms return.   The pain increases when you are more active.   The pain increases within 24 hours after a new or higher level of activity.   When can I return to my normal activities?   Everyone recovers from an injury at a different rate. Return to your activities depends on how soon your back recovers, not by how many days or weeks it has been since your injury has occurred. In general, the longer you have symptoms before you start treatment, the longer it will take to get better. The goal is to return to your normal activities as soon as is safely possible. If you return too soon you may worsen your injury.   It is important that you have fully recovered from your low back pain before you return to any strenuous activity. You must be able to have the same range of motion that you had before your injury. You must be able to walk and twist without pain.   What can I do to help prevent low back pain?   You can reduce the strain on your back by doing the following:   Don't push with your arms when you move a heavy object. Turn around and push backwards so the strain is taken by your legs.   Whenever you sit, sit in a straight-backed chair and hold your spine against the back of the chair.   Bend your knees and hips and keep your back straight when you lift a heavy object.   Avoid lifting heavy objects higher than  your waist.   Hold packages you carry close to your body, with your arms bent.   Use a footrest for one foot when you stand or sit in one spot for a long time. This keeps your back straight.   Bend your knees when you bend over.   Sit close to the pedals when you drive and use your seat belt and a hard backrest or pillow.   Lie on your side with your knees bent when you sleep or rest. It may help to put a pillow between your knees.   Put a pillow under your knees when you sleep on your back.   Raise the foot of the bed 8 inches to discourage sleeping on your stomach unless you have other problems that require that you keep your head elevated.   To rest your back, hold each of these positions for 5?minutes or longer:   Lie on your back, bend your knees, and put pillows under your knees.   Lie on your back on the floor with a pillow under your neck. Bend your knees to a 90-degree angle, and put your lower legs and feet on a chair.   Lie on your back, bend your knees, and bring one knee up to your chest and hold it there. Repeat with the other knee, then bring both knees to your chest. When holding your knee to your chest, grab your thigh rather than your lower leg to avoid over flexing your knee.     Published by [employer].  This content is reviewed periodically and is subject to change as new health information becomes available. The information is intended to inform and educate and is not a replacement for medical evaluation, advice, diagnosis or treatment by a healthcare professional.   Developed by Francisca Zapata RN, [place], and [facility].   ? 2010 [facility] and/or its affiliates. All Rights Reserved.           Low Back Pain Exercise          Standing hamstring stretch: Put the heel of one leg on a stool about 15 inches high. Keep your leg straight. Lean forward, bending at the hips until you feel a mild stretch in the back of your thigh. Make sure you do not roll your shoulders or bend at the waist when  doing this. You want to stretch your leg, not your lower back. Hold the stretch for 15 to 30 seconds. Repeat with each leg 3 times.   Cat and camel: Get down on your hands and knees. Let your stomach sag, allowing your back to curve downward. Hold this position for 5 seconds. Then arch your back and hold for 5 seconds. Do 3 sets of 10.   Quadruped arm and leg raise: Get down on your hands and knees. Pull in your belly button and tighten your abdominal muscles to stiffen your spine. While keeping your abdominals tight, raise one arm and the opposite leg away from you. Hold this position for 5 seconds. Lower your arm and leg slowly and change sides. Do this 10 times on each side.   Pelvic tilt: Lie on your back with your knees bent and your feet flat on the floor. Tighten your abdominal muscles and push your lower back into the floor. Hold this position for 5 seconds, then relax. Do 3 sets of 10.   Partial curl: Lie on your back with your knees bent and your feet flat on the floor. Tighten your stomach muscles. Tuck your chin to your chest. With your hands stretched out in front of you, curl your upper body forward until your shoulders clear the floor. Hold this position for 3 seconds. Don't hold your breath. It helps to breathe out as you lift your shoulders up. Relax back to the floor. Repeat 10 times. Build to 3 sets of 10. To challenge yourself, clasp your hands behind your head and keep your elbows out to the side.   Gluteal stretch: Lie on your back with both knees bent. Rest the ankle of one leg over the knee of your other leg. Grasp the thigh of the bottom leg and pull toward your chest. You will feel a stretch along the buttocks and possibly along the outside of your hip. Hold the stretch for 15 to 30 seconds. Repeat 3 times with each leg.   Extension exercise:   0. Lie face down on the floor for 5 minutes. If this hurts too much, lie face down with a pillow under your stomach. This should relieve your leg or  back pain. When you can lie on your stomach for 5 minutes without a pillow, you can continue with Part B of this exercise.   0. After lying on your stomach for 5 minutes, prop yourself up on your elbows for another 5 minutes. If you can do this without having more leg or buttock pain, you can start doing part C of this exercise.   0. Lie on your stomach with your hands under your shoulders. Then press down on your hands and extend your elbows while keeping your hips flat on the floor. Hold for 1 second and lower yourself to the floor. Do 3 to 5 sets of 10 repetitions. Rest for 1 minute between sets. You should have no pain in your legs when you do this, but it is normal to feel some pain in your lower back.   Do this exercise several times a day.   Side plank: Lie on your side with your legs, hips, and shoulders in a straight line. Prop yourself up onto your forearm so your elbow is directly under your shoulder. Lift your hips off the floor and balance on your forearm and the outside of your foot. Try to hold this position for 15 seconds, then slowly lower your hip to the ground. Switch sides and repeat. Work up to holding for 1 minute or longer. This exercise can be made easier by starting with your knees and hips flexed toward your chest.   Published by [employer].  This content is reviewed periodically and is subject to change as new health information becomes available. The information is intended to inform and educate and is not a replacement for medical evaluation, advice, diagnosis or treatment by a healthcare professional.   Written by Corin Blanco MS, PT, and Francisca Patel PT, [facility], [facility], for [employer]   ? 2010 [facility] and/or its affiliates. All Rights Reserved.         Copyright   [employer] 2011

## 2017-04-27 NOTE — MR AVS SNAPSHOT
After Visit Summary   4/27/2017    Todd Clifford    MRN: [id number]           Patient Information     Date Of Birth          8/30/1934        Visit Information        Provider Department      4/27/2017 4:00 PM Zuly Carrington PA-C [facility]        Today's Diagnoses     Acute bilateral low back pain with right-sided sciatica    -  1    Hypertension goal BP (blood pressure) < 140/80        Psychophysiological insomnia        Major depressive disorder, recurrent episode, mild (H)        Anxiety          Care Instructions                     Acute Back/Neck Pain: Self Care at Home Instructions  Conservative Treatment    Remaining active supports a quicker recovery, keep moving. (ex. Walking, increase time & speed as tolerated).    Bed rest is not recommended. Minimize sitting. Do not remain in one position for extended periods of time.    Maintain routine activity with attention to correct posture; stop aggravating activities.    Over-the-counter pain medications for short term symptom control. (See below)    Muscle relaxants are sometimes helpful for few days, but may cause drowsiness. (See below)    Heat packs - 20 minute interval     Most people improve within 2 weeks; most have significant improvement within 4 weeks.    If symptoms worsen or you experience numbness, contact your provider immediately.    Medications for Pain Relief  Recommended over-the-counter non-steroidal anti-inflammatories:     Naproxen Sodium (Aleve) 220-440 mg. two times per day as needed for pain    If you have been prescribed a muscle relaxant (*cyclobenzapine 5 mg.)  Take    tablet at bedtime  *May cause drowsiness. Do not drive when taking this medication.                 Low Back Pain            What is low back pain?   Low back pain is pain and stiffness in the lower back. It is one of the most common reasons people miss work.   How does it occur?   Your lower back is called your lumbar  spine. It is made up of 5 bones called lumbar vertebrae. In between the vertebrae are shock absorbers called disks. Back pain can occur from an injury to the vertebrae or when a disk bulges or herniates.   Low back pain is usually caused when a ligament or muscle holding a vertebra in its proper position is strained. Vertebrae are bones that make up the spinal column through which the spinal cord passes. When these muscles or ligaments become weak or strained, the spine loses its stability, resulting in pain.   Low back pain can occur if your job involves lifting and carrying heavy objects, or if you spend a lot of time sitting or standing in one position or bending over. It can be caused by a fall or by unusually strenuous exercise. It can be brought on by the tension and stress that cause headaches in some people. It can even be brought on by violent sneezing or coughing.   People who are overweight may have low back pain because of the added stress on their back.   Back pain may occur when the muscles, joints, bones, and connective tissues of the back become inflamed as a result of an infection or an immune system problem. Arthritic disorders as well as some congenital and degenerative conditions may cause back pain.   Back pain accompanied by loss of bladder or bowel control, trouble moving your legs, or numbness or tingling in your arms or legs requires immediate medical treatment.   What are the symptoms?   Symptoms include:   pain in the back or legs   stiffness, spasm, or limited motion   The pain may be constant or may happen only in certain positions. It may get worse when you cough, sneeze, bend, twist, or strain during a bowel movement. The pain may be in only one spot or may spread to other areas, most commonly down the buttocks and into the back of the thigh.   A low back strain typically does not produce pain past the knee into the calf or foot. Tingling or numbness in the calf or foot may indicate a  herniated disk or pinched nerve.   Be sure to see your healthcare provider if:   You have weakness in your leg, especially if you cannot lift your foot, because this may be a sign of nerve damage.   You have new bowel or bladder problems as well as back pain, which may be a sign of severe injury to your spinal cord.   You have pain that gets worse despite treatment.   How is it diagnosed?   Your healthcare provider will review your medical history and examine you. You may have X-rays, an MRI, CT scan, or a bone scan.   How is it treated?   To treat this condition:   Put an ice pack, gel pack, or package of frozen vegetables, wrapped in a cloth on the area every 3 to 4 hours, for up to 20 minutes at a time for the first 2 or 3 days.   Use a heating pad or hot water bottle. Don't let the heating pad get too hot, and don't fall asleep with it. You could get a burn.   Rest in bed on a firm mattress. Often it helps to lie on your back with your knees raised on a pillow. However, some people prefer to lie on their side with their knees bent. It's best to try to stay active, so try not to rest in bed longer than 1 to 2 days.   Take muscle relaxants as recommended by your healthcare provider.   Take an anti-inflammatory such as ibuprofen, or other medicine as directed by your provider. Nonsteroidal anti-inflammatory medicines (NSAIDs) may cause stomach bleeding and other problems. These risks increase with age. Read the label and take as directed. Unless recommended by your healthcare provider, do not take for more than 10 days.   Get a back massage by a trained person.   Wear a belt or corset to support your back.   Do the exercises recommended by your provider. Your provider may also prescribe physical therapy.   Talk with a counselor, if your back pain is related to tension caused by emotional problems.   When the pain is gone, ask your healthcare provider about starting an exercise program such as the following:    Exercise moderately every day, using stretching and warm-up exercises suggested by your provider or physical therapist.   Exercise vigorously for about 30 minutes 3 times a week by walking, swimming, using a stationary bicycle, or doing low-impact aerobics.   Exercising regularly will not only help your back, it will also help keep you healthier overall.   How long will the effects last?   The effects of back pain last as long as the cause exists or until your body recovers from the strain, usually a day or two but sometimes weeks.   How can I take care of myself?   In addition to the treatment described above, keep in mind these suggestions:   Practice good posture. Stand with your head up, shoulders straight, chest forward, weight balanced evenly on both feet, and pelvis tucked in.   Lose weight if you are overweight   Keep your core muscles strong. These are your abdominal and back muscles.   Sleep without a pillow under your head.   Pain is the best way to  the pace you should set in increasing your activity and exercise. Minor discomfort, stiffness, soreness, and mild aches need not interfere with activity. However, limit your activities temporarily if:   Your symptoms return.   The pain increases when you are more active.   The pain increases within 24 hours after a new or higher level of activity.   When can I return to my normal activities?   Everyone recovers from an injury at a different rate. Return to your activities depends on how soon your back recovers, not by how many days or weeks it has been since your injury has occurred. In general, the longer you have symptoms before you start treatment, the longer it will take to get better. The goal is to return to your normal activities as soon as is safely possible. If you return too soon you may worsen your injury.   It is important that you have fully recovered from your low back pain before you return to any strenuous activity. You must be able to  have the same range of motion that you had before your injury. You must be able to walk and twist without pain.   What can I do to help prevent low back pain?   You can reduce the strain on your back by doing the following:   Don't push with your arms when you move a heavy object. Turn around and push backwards so the strain is taken by your legs.   Whenever you sit, sit in a straight-backed chair and hold your spine against the back of the chair.   Bend your knees and hips and keep your back straight when you lift a heavy object.   Avoid lifting heavy objects higher than your waist.   Hold packages you carry close to your body, with your arms bent.   Use a footrest for one foot when you stand or sit in one spot for a long time. This keeps your back straight.   Bend your knees when you bend over.   Sit close to the pedals when you drive and use your seat belt and a hard backrest or pillow.   Lie on your side with your knees bent when you sleep or rest. It may help to put a pillow between your knees.   Put a pillow under your knees when you sleep on your back.   Raise the foot of the bed 8 inches to discourage sleeping on your stomach unless you have other problems that require that you keep your head elevated.   To rest your back, hold each of these positions for 5?minutes or longer:   Lie on your back, bend your knees, and put pillows under your knees.   Lie on your back on the floor with a pillow under your neck. Bend your knees to a 90-degree angle, and put your lower legs and feet on a chair.   Lie on your back, bend your knees, and bring one knee up to your chest and hold it there. Repeat with the other knee, then bring both knees to your chest. When holding your knee to your chest, grab your thigh rather than your lower leg to avoid over flexing your knee.     Published by [employer].  This content is reviewed periodically and is subject to change as new health information becomes available. The information  is intended to inform and educate and is not a replacement for medical evaluation, advice, diagnosis or treatment by a healthcare professional.   Developed by Francisca Zapata RN, [place], and [facility].   ? 2010 [facility] and/or its affiliates. All Rights Reserved.           Low Back Pain Exercise          Standing hamstring stretch: Put the heel of one leg on a stool about 15 inches high. Keep your leg straight. Lean forward, bending at the hips until you feel a mild stretch in the back of your thigh. Make sure you do not roll your shoulders or bend at the waist when doing this. You want to stretch your leg, not your lower back. Hold the stretch for 15 to 30 seconds. Repeat with each leg 3 times.   Cat and camel: Get down on your hands and knees. Let your stomach sag, allowing your back to curve downward. Hold this position for 5 seconds. Then arch your back and hold for 5 seconds. Do 3 sets of 10.   Quadruped arm and leg raise: Get down on your hands and knees. Pull in your belly button and tighten your abdominal muscles to stiffen your spine. While keeping your abdominals tight, raise one arm and the opposite leg away from you. Hold this position for 5 seconds. Lower your arm and leg slowly and change sides. Do this 10 times on each side.   Pelvic tilt: Lie on your back with your knees bent and your feet flat on the floor. Tighten your abdominal muscles and push your lower back into the floor. Hold this position for 5 seconds, then relax. Do 3 sets of 10.   Partial curl: Lie on your back with your knees bent and your feet flat on the floor. Tighten your stomach muscles. Tuck your chin to your chest. With your hands stretched out in front of you, curl your upper body forward until your shoulders clear the floor. Hold this position for 3 seconds. Don't hold your breath. It helps to breathe out as you lift your shoulders up. Relax back to the floor. Repeat 10 times. Build to 3 sets of 10. To challenge yourself,  clasp your hands behind your head and keep your elbows out to the side.   Gluteal stretch: Lie on your back with both knees bent. Rest the ankle of one leg over the knee of your other leg. Grasp the thigh of the bottom leg and pull toward your chest. You will feel a stretch along the buttocks and possibly along the outside of your hip. Hold the stretch for 15 to 30 seconds. Repeat 3 times with each leg.   Extension exercise:   0. Lie face down on the floor for 5 minutes. If this hurts too much, lie face down with a pillow under your stomach. This should relieve your leg or back pain. When you can lie on your stomach for 5 minutes without a pillow, you can continue with Part B of this exercise.   0. After lying on your stomach for 5 minutes, prop yourself up on your elbows for another 5 minutes. If you can do this without having more leg or buttock pain, you can start doing part C of this exercise.   0. Lie on your stomach with your hands under your shoulders. Then press down on your hands and extend your elbows while keeping your hips flat on the floor. Hold for 1 second and lower yourself to the floor. Do 3 to 5 sets of 10 repetitions. Rest for 1 minute between sets. You should have no pain in your legs when you do this, but it is normal to feel some pain in your lower back.   Do this exercise several times a day.   Side plank: Lie on your side with your legs, hips, and shoulders in a straight line. Prop yourself up onto your forearm so your elbow is directly under your shoulder. Lift your hips off the floor and balance on your forearm and the outside of your foot. Try to hold this position for 15 seconds, then slowly lower your hip to the ground. Switch sides and repeat. Work up to holding for 1 minute or longer. This exercise can be made easier by starting with your knees and hips flexed toward your chest.   Published by [employer].  This content is reviewed periodically and is subject to change as [facility]  "information becomes available. The information is intended to inform and educate and is not a replacement for medical evaluation, advice, diagnosis or treatment by a healthcare professional.   Written by Corin Blanco, MS, PT, and Francisca Patel PT, [facility], [facility], for [employer]   ?  [facility] and/or its affiliates. All Rights Reserved.         Copyright   Clinical [employer]                     Follow-ups after your visit        Who to contact     If you have questions or need follow up information about today's clinic visit or your schedule please contact [facility] directly at [phone].  Normal or non-critical lab and imaging results will be communicated to you by MyChart, letter or phone within 4 business days after the clinic has received the results. If you do not hear from us within 7 days, please contact the clinic through [employer] or phone. If you have a critical or abnormal lab result, we will notify you by phone as soon as possible.  Submit refill requests through [employer] or call your pharmacy and they will forward the refill request to us. Please allow 3 business days for your refill to be completed.          Additional Information About Your Visit        MyChart Information     [employer] lets you send messages to your doctor, view your test results, renew your prescriptions, schedule appointments and more. To sign up, go to [email or web address] . Click on \"Log in\" on the left side of the screen, which will take you to the Welcome page. Then click on \"Sign up Now\" on the right side of the page.     You will be asked to enter the access code listed below, as well as some personal information. Please follow the directions to create your username and password.     Your access code is: [email or web address]  Expires: 2017 10:03 PM     Your access code will  in 90 days. If you need help or a new code, please call your [facility] or [phone].        Care EveryWhere " ID     This is your Care EveryWhere ID. This could be used by other organizations to access your [place] medical records  [id number]        Your Vitals Were     Pulse Temperature Respirations Pulse Oximetry BMI (Body Mass Index)       109 98.5  F (36.9  C) (Oral) 16 94% 22.02 kg/m2        Blood Pressure from Last 3 Encounters:   04/27/17 (!) 171/92   04/14/17 158/80   11/29/16 132/70    Weight from Last 3 Encounters:   04/27/17 109 lb (49.4 kg)   04/14/17 113 lb (51.3 kg)   11/29/16 101 lb 9.6 oz (46.1 kg)              Today, you had the following     No orders found for display         Today's Medication Changes          These changes are accurate as of: 4/27/17  4:41 PM.  If you have any questions, ask your nurse or doctor.               Start taking these medicines.        Dose/Directions    cyclobenzaprine 5 MG tablet   Commonly known as:  FLEXERIL   Used for:  Acute bilateral low back pain with right-sided sciatica   Started by:  Zuly Carrington PA-C        Dose:  2.5-5 mg   Take 0.5-1 tablets (2.5-5 mg) by mouth nightly as needed for muscle spasms   Quantity:  30 tablet   Refills:  0       naproxen 500 MG tablet   Commonly known as:  NAPROSYN   Used for:  Acute bilateral low back pain with right-sided sciatica   Started by:  Zuly Carrington PA-C        Dose:  500 mg   Take 1 tablet (500 mg) by mouth 2 times daily as needed for moderate pain   Quantity:  60 tablet   Refills:  1         These medicines have changed or have updated prescriptions.        Dose/Directions    amitriptyline 25 MG tablet   Commonly known as:  ELAVIL   This may have changed:    - how much to take  - how to take this  - when to take this  - additional instructions   Used for:  Psychophysiological insomnia, Major depressive disorder, recurrent episode, mild (H), Anxiety   Changed by:  Zuly Carrington PA-C        Dose:  25 mg   Take 1 tablet (25 mg) by mouth 2 times daily   Quantity:  60  tablet   Refills:  11            Where to get your medicines      These medications were sent to [facility] [place], [place] - [address] [facility]  [address] [facility] [address]     Phone:  [phone]     cyclobenzaprine 5 MG tablet    naproxen 500 MG tablet         These medications were sent to [id number] - [place], [place] - [address] [address]  [address] [facility] [address]     Phone:  [phone]     amitriptyline 25 MG tablet    lisinopril 20 MG tablet                Primary Care Provider Office Phone # Fax #    Zuly JENNIFER Carrington PA-C [phone]       [facility] [address] [facility] [address]  [facility] [address]        Thank you!     Thank you for choosing [facility]  for your care. Our goal is always to provide you with excellent care. Hearing back from our patients is one way we can continue to improve our services. Please take a few minutes to complete the written survey that you may receive in the mail after your visit with us. Thank you!             Your Updated Medication List - Protect others around you: Learn how to safely use, store and throw away your medicines at www.disposemymeds.org.          This list is accurate as of: 4/27/17  4:41 PM.  Always use your most recent med list.                   Brand Name Dispense Instructions for use    ALPRAZolam 0.25 MG tablet    XANAX    60 tablet    TAKE ONE-HALF TO ONE TABLET BY MOUTH EVERY 6 HOURS AS NEEDED. MAX OF 2.5 PER 24HRS       amitriptyline 25 MG tablet    ELAVIL    60 tablet    Take 1 tablet (25 mg) by mouth 2 times daily       cyclobenzaprine 5 MG tablet    FLEXERIL    30 tablet    Take 0.5-1 tablets (2.5-5 mg) by mouth nightly as needed for muscle spasms       lisinopril 20 MG tablet    PRINIVIL/ZESTRIL    90 tablet    Take 1 tablet (20 mg) by mouth daily       melatonin 5 MG tablet    MELATONIN MAXIMUM STRENGTH    90 tablet    Take 1 tablet (5 mg) by mouth  nightly as needed for sleep       naproxen 500 MG tablet    NAPROSYN    60 tablet    Take 1 tablet (500 mg) by mouth 2 times daily as needed for moderate pain       senna 8.6 MG tablet    SENOKOT    90 tablet    Take 1 tablet by mouth daily

## 2017-04-27 NOTE — NURSING NOTE
"Chief Complaint   Patient presents with     Hospital F/U     Panel Management     mychart, height, pnuemovax, pcv, honoring choices, dap, phq9       Initial BP (!) 171/92 (BP Location: Left arm, Patient Position: Chair, Cuff Size: Adult Regular)  Pulse 109  Temp 98.5  F (36.9  C) (Oral)  Resp 16  Wt 109 lb (49.4 kg)  SpO2 94%  BMI 22.02 kg/m2 Estimated body mass index is 22.02 kg/(m^2) as calculated from the following:    Height as of 11/29/16: 4' 11\" (1.499 m).    Weight as of this encounter: 109 lb (49.4 kg).  Medication Reconciliation: complete  "

## 2017-07-12 ENCOUNTER — TELEPHONE (OUTPATIENT)
Dept: FAMILY MEDICINE | Facility: OTHER | Age: 82
End: 2017-07-12

## 2017-07-12 DIAGNOSIS — F41.9 ANXIETY: ICD-10-CM

## 2017-07-12 NOTE — TELEPHONE ENCOUNTER
xanax      Last Written Prescription Date:  11/29/16  Last Fill Quantity: 60,   # refills: 5  Last Office Visit with [facility], [facility] or [facility] prescribing provider: 04/27/17  Future Office visit:    Next 5 appointments (look out 90 days)     Jul 14, 2017 12:00 PM CDT   Office Visit with Zuly Carrington PA-C   [facility]    [address] [place] [id number]   [phone]                   Routing refill request to provider for review/approval because:  Drug not on the [facility], [facility] or [facility] refill protocol or controlled substance

## 2017-07-13 RX ORDER — ALPRAZOLAM 0.25 MG
TABLET ORAL
Qty: 60 TABLET | Refills: 0 | Status: SHIPPED | OUTPATIENT
Start: 2017-07-13 | End: 2017-07-14

## 2017-07-13 NOTE — TELEPHONE ENCOUNTER
Medication(s) reviewed and approved. Rx. was signed and placed in MA task.       Please notify that this has been done.      Please close the encounter when finished with it.     Zuly Carrington PA-C

## 2017-07-14 ENCOUNTER — RADIANT APPOINTMENT (OUTPATIENT)
Dept: GENERAL RADIOLOGY | Facility: OTHER | Age: 82
End: 2017-07-14
Attending: PHYSICIAN ASSISTANT
Payer: MEDICARE

## 2017-07-14 ENCOUNTER — OFFICE VISIT (OUTPATIENT)
Dept: FAMILY MEDICINE | Facility: OTHER | Age: 82
End: 2017-07-14
Payer: MEDICARE

## 2017-07-14 ENCOUNTER — TELEPHONE (OUTPATIENT)
Dept: FAMILY MEDICINE | Facility: OTHER | Age: 82
End: 2017-07-14

## 2017-07-14 VITALS
BODY MASS INDEX: 20.97 KG/M2 | HEART RATE: 104 BPM | SYSTOLIC BLOOD PRESSURE: 146 MMHG | RESPIRATION RATE: 24 BRPM | DIASTOLIC BLOOD PRESSURE: 88 MMHG | OXYGEN SATURATION: 95 % | WEIGHT: 103.8 LBS | TEMPERATURE: 98.1 F

## 2017-07-14 DIAGNOSIS — M54.41 ACUTE BILATERAL LOW BACK PAIN WITH RIGHT-SIDED SCIATICA: Primary | ICD-10-CM

## 2017-07-14 DIAGNOSIS — M54.41 ACUTE BILATERAL LOW BACK PAIN WITH RIGHT-SIDED SCIATICA: ICD-10-CM

## 2017-07-14 DIAGNOSIS — F41.9 ANXIETY: ICD-10-CM

## 2017-07-14 PROCEDURE — 99214 OFFICE O/P EST MOD 30 MIN: CPT | Performed by: PHYSICIAN ASSISTANT

## 2017-07-14 PROCEDURE — 72100 X-RAY EXAM L-S SPINE 2/3 VWS: CPT

## 2017-07-14 RX ORDER — METHYLPREDNISOLONE 4 MG
TABLET, DOSE PACK ORAL
Qty: 21 TABLET | Refills: 0 | Status: SHIPPED | OUTPATIENT
Start: 2017-07-14 | End: 2017-09-07

## 2017-07-14 RX ORDER — ALPRAZOLAM 0.25 MG
TABLET ORAL
Qty: 60 TABLET | Refills: 0 | Status: SHIPPED | OUTPATIENT
Start: 2017-07-14 | End: 2017-08-22

## 2017-07-14 ASSESSMENT — PAIN SCALES - GENERAL: PAINLEVEL: WORST PAIN (10)

## 2017-07-14 NOTE — NURSING NOTE
"Chief Complaint   Patient presents with     Back Pain     recheck     Panel Management     tdap, pneumovax, pcv, honoring choices, dap, phq9       Initial /88 (BP Location: Left arm, Patient Position: Chair, Cuff Size: Adult Regular)  Pulse 104  Temp 98.1  F (36.7  C) (Oral)  Resp 24  Wt 103 lb 12.8 oz (47.1 kg)  SpO2 95%  BMI 20.97 kg/m2 Estimated body mass index is 20.97 kg/(m^2) as calculated from the following:    Height as of 11/29/16: 4' 11\" (1.499 m).    Weight as of this encounter: 103 lb 12.8 oz (47.1 kg).  Medication Reconciliation: complete  "

## 2017-07-14 NOTE — MR AVS SNAPSHOT
After Visit Summary   7/14/2017    Todd Clifford    MRN: [id number]           Patient Information     Date Of Birth          8/30/1934        Visit Information        Provider Department      7/14/2017 12:00 PM Zuly Carrington PA-C [facility]        Today's Diagnoses     Acute bilateral low back pain with right-sided sciatica    -  1    Anxiety          Care Instructions    - Start medrol dose pack (steroid) for 5 days, follow instructions   - RECHECK 1 month            Low Back Pain            What is low back pain?   Low back pain is pain and stiffness in the lower back. It is one of the most common reasons people miss work.   How does it occur?   Your lower back is called your lumbar spine. It is made up of 5 bones called lumbar vertebrae. In between the vertebrae are shock absorbers called disks. Back pain can occur from an injury to the vertebrae or when a disk bulges or herniates.   Low back pain is usually caused when a ligament or muscle holding a vertebra in its proper position is strained. Vertebrae are bones that make up the spinal column through which the spinal cord passes. When these muscles or ligaments become weak or strained, the spine loses its stability, resulting in pain.   Low back pain can occur if your job involves lifting and carrying heavy objects, or if you spend a lot of time sitting or standing in one position or bending over. It can be caused by a fall or by unusually strenuous exercise. It can be brought on by the tension and stress that cause headaches in some people. It can even be brought on by violent sneezing or coughing.   People who are overweight may have low back pain because of the added stress on their back.   Back pain may occur when the muscles, joints, bones, and connective tissues of the back become inflamed as a result of an infection or an immune system problem. Arthritic disorders as well as some congenital and  degenerative conditions may cause back pain.   Back pain accompanied by loss of bladder or bowel control, trouble moving your legs, or numbness or tingling in your arms or legs requires immediate medical treatment.   What are the symptoms?   Symptoms include:   pain in the back or legs   stiffness, spasm, or limited motion   The pain may be constant or may happen only in certain positions. It may get worse when you cough, sneeze, bend, twist, or strain during a bowel movement. The pain may be in only one spot or may spread to other areas, most commonly down the buttocks and into the back of the thigh.   A low back strain typically does not produce pain past the knee into the calf or foot. Tingling or numbness in the calf or foot may indicate a herniated disk or pinched nerve.   Be sure to see your healthcare provider if:   You have weakness in your leg, especially if you cannot lift your foot, because this may be a sign of nerve damage.   You have new bowel or bladder problems as well as back pain, which may be a sign of severe injury to your spinal cord.   You have pain that gets worse despite treatment.   How is it diagnosed?   Your healthcare provider will review your medical history and examine you. You may have X-rays, an MRI, CT scan, or a bone scan.   How is it treated?   To treat this condition:   Put an ice pack, gel pack, or package of frozen vegetables, wrapped in a cloth on the area every 3 to 4 hours, for up to 20 minutes at a time for the first 2 or 3 days.   Use a heating pad or hot water bottle. Don't let the heating pad get too hot, and don't fall asleep with it. You could get a burn.   Rest in bed on a firm mattress. Often it helps to lie on your back with your knees raised on a pillow. However, some people prefer to lie on their side with their knees bent. It's best to try to stay active, so try not to rest in bed longer than 1 to 2 days.   Take muscle relaxants as recommended by your healthcare  provider.   Take an anti-inflammatory such as ibuprofen, or other medicine as directed by your provider. Nonsteroidal anti-inflammatory medicines (NSAIDs) may cause stomach bleeding and other problems. These risks increase with age. Read the label and take as directed. Unless recommended by your healthcare provider, do not take for more than 10 days.   Get a back massage by a trained person.   Wear a belt or corset to support your back.   Do the exercises recommended by your provider. Your provider may also prescribe physical therapy.   Talk with a counselor, if your back pain is related to tension caused by emotional problems.   When the pain is gone, ask your healthcare provider about starting an exercise program such as the following:   Exercise moderately every day, using stretching and warm-up exercises suggested by your provider or physical therapist.   Exercise vigorously for about 30 minutes 3 times a week by walking, swimming, using a stationary bicycle, or doing low-impact aerobics.   Exercising regularly will not only help your back, it will also help keep you healthier overall.   How long will the effects last?   The effects of back pain last as long as the cause exists or until your body recovers from the strain, usually a day or two but sometimes weeks.   How can I take care of myself?   In addition to the treatment described above, keep in mind these suggestions:   Practice good posture. Stand with your head up, shoulders straight, chest forward, weight balanced evenly on both feet, and pelvis tucked in.   Lose weight if you are overweight   Keep your core muscles strong. These are your abdominal and back muscles.   Sleep without a pillow under your head.   Pain is the best way to  the pace you should set in increasing your activity and exercise. Minor discomfort, stiffness, soreness, and mild aches need not interfere with activity. However, limit your activities temporarily if:   Your symptoms  return.   The pain increases when you are more active.   The pain increases within 24 hours after a new or higher level of activity.   When can I return to my normal activities?   Everyone recovers from an injury at a different rate. Return to your activities depends on how soon your back recovers, not by how many days or weeks it has been since your injury has occurred. In general, the longer you have symptoms before you start treatment, the longer it will take to get better. The goal is to return to your normal activities as soon as is safely possible. If you return too soon you may worsen your injury.   It is important that you have fully recovered from your low back pain before you return to any strenuous activity. You must be able to have the same range of motion that you had before your injury. You must be able to walk and twist without pain.   What can I do to help prevent low back pain?   You can reduce the strain on your back by doing the following:   Don't push with your arms when you move a heavy object. Turn around and push backwards so the strain is taken by your legs.   Whenever you sit, sit in a straight-backed chair and hold your spine against the back of the chair.   Bend your knees and hips and keep your back straight when you lift a heavy object.   Avoid lifting heavy objects higher than your waist.   Hold packages you carry close to your body, with your arms bent.   Use a footrest for one foot when you stand or sit in one spot for a long time. This keeps your back straight.   Bend your knees when you bend over.   Sit close to the pedals when you drive and use your seat belt and a hard backrest or pillow.   Lie on your side with your knees bent when you sleep or rest. It may help to put a pillow between your knees.   Put a pillow under your knees when you sleep on your back.   Raise the foot of the bed 8 inches to discourage sleeping on your stomach unless you have other problems that require that  you keep your head elevated.   To rest your back, hold each of these positions for 5?minutes or longer:   Lie on your back, bend your knees, and put pillows under your knees.   Lie on your back on the floor with a pillow under your neck. Bend your knees to a 90-degree angle, and put your lower legs and feet on a chair.   Lie on your back, bend your knees, and bring one knee up to your chest and hold it there. Repeat with the other knee, then bring both knees to your chest. When holding your knee to your chest, grab your thigh rather than your lower leg to avoid over flexing your knee.     Published by [employer].  This content is reviewed periodically and is subject to change as new health information becomes available. The information is intended to inform and educate and is not a replacement for medical evaluation, advice, diagnosis or treatment by a healthcare professional.   Developed by Francisca Zapata RN, [place], and [employer].   ? 2010 [facility] and/or its affiliates. All Rights Reserved.           Low Back Pain Exercise          Standing hamstring stretch: Put the heel of one leg on a stool about 15 inches high. Keep your leg straight. Lean forward, bending at the hips until you feel a mild stretch in the back of your thigh. Make sure you do not roll your shoulders or bend at the waist when doing this. You want to stretch your leg, not your lower back. Hold the stretch for 15 to 30 seconds. Repeat with each leg 3 times.   Cat and camel: Get down on your hands and knees. Let your stomach sag, allowing your back to curve downward. Hold this position for 5 seconds. Then arch your back and hold for 5 seconds. Do 3 sets of 10.   Quadruped arm and leg raise: Get down on your hands and knees. Pull in your belly button and tighten your abdominal muscles to stiffen your spine. While keeping your abdominals tight, raise one arm and the opposite leg away from you. Hold this position for 5 seconds. Lower your arm  and leg slowly and change sides. Do this 10 times on each side.   Pelvic tilt: Lie on your back with your knees bent and your feet flat on the floor. Tighten your abdominal muscles and push your lower back into the floor. Hold this position for 5 seconds, then relax. Do 3 sets of 10.   Partial curl: Lie on your back with your knees bent and your feet flat on the floor. Tighten your stomach muscles. Tuck your chin to your chest. With your hands stretched out in front of you, curl your upper body forward until your shoulders clear the floor. Hold this position for 3 seconds. Don't hold your breath. It helps to breathe out as you lift your shoulders up. Relax back to the floor. Repeat 10 times. Build to 3 sets of 10. To challenge yourself, clasp your hands behind your head and keep your elbows out to the side.   Gluteal stretch: Lie on your back with both knees bent. Rest the ankle of one leg over the knee of your other leg. Grasp the thigh of the bottom leg and pull toward your chest. You will feel a stretch along the buttocks and possibly along the outside of your hip. Hold the stretch for 15 to 30 seconds. Repeat 3 times with each leg.   Extension exercise:   0. Lie face down on the floor for 5 minutes. If this hurts too much, lie face down with a pillow under your stomach. This should relieve your leg or back pain. When you can lie on your stomach for 5 minutes without a pillow, you can continue with Part B of this exercise.   0. After lying on your stomach for 5 minutes, prop yourself up on your elbows for another 5 minutes. If you can do this without having more leg or buttock pain, you can start doing part C of this exercise.   0. Lie on your stomach with your hands under your shoulders. Then press down on your hands and extend your elbows while keeping your hips flat on the floor. Hold for 1 second and lower yourself to the floor. Do 3 to 5 sets of 10 repetitions. Rest for 1 minute between sets. You should have  no pain in your legs when you do this, but it is normal to feel some pain in your lower back.   Do this exercise several times a day.   Side plank: Lie on your side with your legs, hips, and shoulders in a straight line. Prop yourself up onto your forearm so your elbow is directly under your shoulder. Lift your hips off the floor and balance on your forearm and the outside of your foot. Try to hold this position for 15 seconds, then slowly lower your hip to the ground. Switch sides and repeat. Work up to holding for 1 minute or longer. This exercise can be made easier by starting with your knees and hips flexed toward your chest.   Published by [employer].  This content is reviewed periodically and is subject to change as new health information becomes available. The information is intended to inform and educate and is not a replacement for medical evaluation, advice, diagnosis or treatment by a healthcare professional.   Written by Corin Blanco, MS, PT, and Francisca Patel PT, [facility], [facility], for [facility]   ? 2010 [facility] and/or its affiliates. All Rights Reserved.         Copyright   [employer] 2011                    Follow-ups after your visit        Additional Services     HOME CARE NURSING REFERRAL       **Order classes of: [facility], [facility] and [facility] will route to the Home Care and Hospice Referral Pool.  Home Care or Hospice will then contact the patient to schedule their appointment.**    If you do not hear from Home Care and Hospice, or you would like to call to schedule, please call the referring place of service that your provider has listed below.  ______________________________________________________________________    Your provider has referred you to: PREFERRED PROVIDERS:  [facility]: [facility] [place] [phone]   [email or web address]    Extended Emergency Contact Information  Primary Emergency Contact:  JOHN SCANLON   [facility]  Home Phone: [phone]  Relation: Daughter    Patient Anticipated Discharge Date: 2-3 months    RN, PT, HHA to begin 24 - 48 hours after discharge.  PLEASE EVALUATE AND TREAT (Evaluation timeline is 24 - 48 hrs. Please call if there is need for a variance to this timeline).    REASON FOR REFERRAL: Assessment & Treatment: PT    ADDITIONAL SERVICES NEEDED: [facility]    OTHER PERTINENT INFORMATION: Patient was last seen by provider on 7/14/17 for .    Current Outpatient Prescriptions:  ALPRAZolam (XANAX) 0.25 MG tablet, TAKE ONE-HALF TO ONE TABLET BY MOUTH EVERY 6 HOURS AS NEEDED MAX  OF  2.5  TABLETS  PER  24  HRS., Disp: 60 tablet, Rfl: 0  lisinopril (PRINIVIL/ZESTRIL) 20 MG tablet, Take 1 tablet (20 mg) by mouth daily, Disp: 90 tablet, Rfl: 3  amitriptyline (ELAVIL) 25 MG tablet, Take 1 tablet (25 mg) by mouth 2 times daily, Disp: 60 tablet, Rfl: 11  naproxen (NAPROSYN) 500 MG tablet, Take 1 tablet (500 mg) by mouth 2 times daily as needed for moderate pain, Disp: 60 tablet, Rfl: 1  cyclobenzaprine (FLEXERIL) 5 MG tablet, Take 0.5-1 tablets (2.5-5 mg) by mouth nightly as needed for muscle spasms (Patient not taking: Reported on 7/14/2017), Disp: 30 tablet, Rfl: 0  melatonin (MELATONIN MAXIMUM STRENGTH) 5 MG tablet, Take 1 tablet (5 mg) by mouth nightly as needed for sleep (Patient not taking: Reported on 7/14/2017), Disp: 90 tablet, Rfl: 3  senna (SENOKOT) 8.6 MG tablet, Take 1 tablet by mouth daily (Patient not taking: Reported on 7/14/2017), Disp: 90 tablet, Rfl: 0      Patient Active Problem List:     Anxiety     Advanced directives, counseling/discussion     Hypertension goal BP (blood pressure) < 140/80     Mild major depression (H)     Chronic constipation     Psychophysiological insomnia      Documentation of Face to Face and Certification for Home Health Services    I certify that patientTodd is under my care and that I, or a Nurse Practitioner or Physician's  Assistant working with me, had a face-to-face encounter that meets the physician face-to-face encounter requirements with this patient on: 7/14/2017.    This encounter with the patient was in whole, or in part, for the following medical condition, which is the primary reason for Home Health Care: Eduard Carrington PA-C  [facility]   .    I certify that, based on my findings, the following services are medically necessary Home Health Services: Physical Therapy    My clinical findings support the need for the above services because: Physical Therapy Services are needed to assess and treat the following functional impairments: severe social anxiety. Refuses to leave home except to go to doctor       Further, I certify that my clinical findings support that this patient is homebound (i.e. absences from home require considerable and taxing effort and are for medical reasons or Religion services or infrequently or of short duration when for other reasons) because: Mental health symptoms including: Mental health condition is exacerbated by exposure to crowds, unfamiliar environment or new stressful situations..    Based on the above findings, I certify that this patient is confined to the home and needs intermittent skilled nursing care, physical therapy and/or speech therapy.  The patient is under my care, and I have initiated the establishment of the plan of care.  This patient will be followed by a physician who will periodically review the plan of care.    Physician/Provider to provide follow up care: Zuly Carrington certified Physician at time of discharge: Sakina Chamberlain MD     Please be aware that coverage of these services is subject to the terms and limitations of your health insurance plan.  Call member services at your health plan with any benefit or coverage questions.                  Your next 10 appointments already scheduled     Jul 14, 2017 12:40 PM CDT  "  XR LUMBAR SPINE 2/3 VIEWS [facility]    [address] [address] [phone]           Please bring a list of your current medicines to your exam. (Include vitamins, minerals and over-thecounter medicines.) Leave your valuables at home.  Tell your doctor if there is a chance you may be pregnant.  You do not need to do anything special for this exam.              Who to contact     If you have questions or need follow up information about today's clinic visit or your schedule please contact [facility] directly at [phone].  Normal or non-critical lab and imaging results will be communicated to you by MyChart, letter or phone within 4 business days after the clinic has received the results. If you do not hear from us within 7 days, please contact the clinic through [facility] or phone. If you have a critical or abnormal lab result, we will notify you by phone as soon as possible.  Submit refill requests through [employer] or call your pharmacy and they will forward the refill request to us. Please allow 3 business days for your refill to be completed.          Additional Information About Your Visit        MyChart Information     [employer] lets you send messages to your doctor, view your test results, renew your prescriptions, schedule appointments and more. To sign up, go to [email or web address] . Click on \"Log in\" on the left side of the screen, which will take you to the Welcome page. Then click on \"Sign up Now\" on the right side of the page.     You will be asked to enter the access code listed below, as well as some personal information. Please follow the directions to create your username and password.     Your access code is: [email or web address]  Expires: 10/12/2017 12:36 PM     Your access code will  in 90 days. If you need help or a new code, please call your [facility] or [phone].        Care EveryWhere " ID     This is your Care EveryWhere ID. This could be used by other organizations to access your [place] medical records  [id number]        Your Vitals Were     Pulse Temperature Respirations Pulse Oximetry BMI (Body Mass Index)       104 98.1  F (36.7  C) (Oral) 24 95% 20.97 kg/m2        Blood Pressure from Last 3 Encounters:   07/14/17 146/88   04/27/17 (!) 171/92   04/14/17 158/80    Weight from Last 3 Encounters:   07/14/17 103 lb 12.8 oz (47.1 kg)   04/27/17 109 lb (49.4 kg)   04/14/17 113 lb (51.3 kg)              We Performed the Following     HOME CARE NURSING REFERRAL          Today's Medication Changes          These changes are accurate as of: 7/14/17 12:36 PM.  If you have any questions, ask your nurse or doctor.               Start taking these medicines.        Dose/Directions    methylPREDNISolone 4 MG tablet   Commonly known as:  MEDROL DOSEPAK   Used for:  Acute bilateral low back pain with right-sided sciatica   Started by:  Zuly Carrington PA-C        Follow package instructions   Quantity:  21 tablet   Refills:  0         These medicines have changed or have updated prescriptions.        Dose/Directions    ALPRAZolam 0.25 MG tablet   Commonly known as:  XANAX   This may have changed:  See the new instructions.   Used for:  Anxiety   Changed by:  Zuly Carrington PA-C        TAKE ONE-HALF TO ONE TABLET BY MOUTH EVERY 6 HOURS AS NEEDED MAX  OF  2.5  TABLETS  PER  24  HRS.   Quantity:  60 tablet   Refills:  0            Where to get your medicines      These medications were sent to [facility] [place], [place] - [address] [address]  [address] [address] [address]     Phone:  [phone]     methylPREDNISolone 4 MG tablet         Some of these will need a paper prescription and others can be bought over the counter.  Ask your nurse if you have questions.     Bring a paper prescription for each of these medications     ALPRAZolam 0.25 MG tablet                 Primary Care Provider Office Phone # Fax #    Zuly RODRIGUEZ TELMA Carrington [phone]       [address]  [facility] [address]        Equal Access to Services     BLANCHE CRUMP : Hadii aad ku hadcolino Soomaali, waaxda luqadaha, qaybta kaalmada adeegyada, sam qureshi haymarivel cheryshayythuy tatum. So [facility] [phone].    ATENCIÓN: Si habla español, tiene a silva disposición servicios gratuitos de asistencia lingüística. Llame al [phone].    We comply with applicable federal civil rights laws and [place] laws. We do not discriminate on the basis of race, color, national origin, age, disability sex, sexual orientation or gender identity.            Thank you!     Thank you for choosing [facility]  for your care. Our goal is always to provide you with excellent care. Hearing back from our patients is one way we can continue to improve our services. Please take a few minutes to complete the written survey that you may receive in the mail after your visit with us. Thank you!             Your Updated Medication List - Protect others around you: Learn how to safely use, store and throw away your medicines at www.disposemymeds.org.          This list is accurate as of: 7/14/17 12:36 PM.  Always use your most recent med list.                   Brand Name Dispense Instructions for use Diagnosis    ALPRAZolam 0.25 MG tablet    XANAX    60 tablet    TAKE ONE-HALF TO ONE TABLET BY MOUTH EVERY 6 HOURS AS NEEDED MAX  OF  2.5  TABLETS  PER  24  HRS.    Anxiety       amitriptyline 25 MG tablet    ELAVIL    60 tablet    Take 1 tablet (25 mg) by mouth 2 times daily    Psychophysiological insomnia, Major depressive disorder, recurrent episode, mild (H), Anxiety       cyclobenzaprine 5 MG tablet    FLEXERIL    30 tablet    Take 0.5-1 tablets (2.5-5 mg) by mouth nightly as needed for muscle spasms    Acute bilateral low back pain with right-sided sciatica        lisinopril 20 MG tablet    PRINIVIL/ZESTRIL    90 tablet    Take 1 tablet (20 mg) by mouth daily    Hypertension goal BP (blood pressure) < 140/80       melatonin 5 MG tablet    MELATONIN MAXIMUM STRENGTH    90 tablet    Take 1 tablet (5 mg) by mouth nightly as needed for sleep    Psychophysiological insomnia       methylPREDNISolone 4 MG tablet    MEDROL DOSEPAK    21 tablet    Follow package instructions    Acute bilateral low back pain with right-sided sciatica       naproxen 500 MG tablet    NAPROSYN    60 tablet    Take 1 tablet (500 mg) by mouth 2 times daily as needed for moderate pain    Acute bilateral low back pain with right-sided sciatica       senna 8.6 MG tablet    SENOKOT    90 tablet    Take 1 tablet by mouth daily    Chronic constipation

## 2017-07-14 NOTE — TELEPHONE ENCOUNTER
Reason for call:  Delay until Monday for PT cause [facility] does not have therapy during the weekend.

## 2017-07-14 NOTE — PATIENT INSTRUCTIONS
- Start medrol dose pack (steroid) for 5 days, follow instructions   - RECHECK 1 month            Low Back Pain            What is low back pain?   Low back pain is pain and stiffness in the lower back. It is one of the most common reasons people miss work.   How does it occur?   Your lower back is called your lumbar spine. It is made up of 5 bones called lumbar vertebrae. In between the vertebrae are shock absorbers called disks. Back pain can occur from an injury to the vertebrae or when a disk bulges or herniates.   Low back pain is usually caused when a ligament or muscle holding a vertebra in its proper position is strained. Vertebrae are bones that make up the spinal column through which the spinal cord passes. When these muscles or ligaments become weak or strained, the spine loses its stability, resulting in pain.   Low back pain can occur if your job involves lifting and carrying heavy objects, or if you spend a lot of time sitting or standing in one position or bending over. It can be caused by a fall or by unusually strenuous exercise. It can be brought on by the tension and stress that cause headaches in some people. It can even be brought on by violent sneezing or coughing.   People who are overweight may have low back pain because of the added stress on their back.   Back pain may occur when the muscles, joints, bones, and connective tissues of the back become inflamed as a result of an infection or an immune system problem. Arthritic disorders as well as some congenital and degenerative conditions may cause back pain.   Back pain accompanied by loss of bladder or bowel control, trouble moving your legs, or numbness or tingling in your arms or legs requires immediate medical treatment.   What are the symptoms?   Symptoms include:   pain in the back or legs   stiffness, spasm, or limited motion   The pain may be constant or may happen only in certain positions. It may get worse when you cough, sneeze,  bend, twist, or strain during a bowel movement. The pain may be in only one spot or may spread to other areas, most commonly down the buttocks and into the back of the thigh.   A low back strain typically does not produce pain past the knee into the calf or foot. Tingling or numbness in the calf or foot may indicate a herniated disk or pinched nerve.   Be sure to see your healthcare provider if:   You have weakness in your leg, especially if you cannot lift your foot, because this may be a sign of nerve damage.   You have new bowel or bladder problems as well as back pain, which may be a sign of severe injury to your spinal cord.   You have pain that gets worse despite treatment.   How is it diagnosed?   Your healthcare provider will review your medical history and examine you. You may have X-rays, an MRI, CT scan, or a bone scan.   How is it treated?   To treat this condition:   Put an ice pack, gel pack, or package of frozen vegetables, wrapped in a cloth on the area every 3 to 4 hours, for up to 20 minutes at a time for the first 2 or 3 days.   Use a heating pad or hot water bottle. Don't let the heating pad get too hot, and don't fall asleep with it. You could get a burn.   Rest in bed on a firm mattress. Often it helps to lie on your back with your knees raised on a pillow. However, some people prefer to lie on their side with their knees bent. It's best to try to stay active, so try not to rest in bed longer than 1 to 2 days.   Take muscle relaxants as recommended by your healthcare provider.   Take an anti-inflammatory such as ibuprofen, or other medicine as directed by your provider. Nonsteroidal anti-inflammatory medicines (NSAIDs) may cause stomach bleeding and other problems. These risks increase with age. Read the label and take as directed. Unless recommended by your healthcare provider, do not take for more than 10 days.   Get a back massage by a trained person.   Wear a belt or corset to support your  back.   Do the exercises recommended by your provider. Your provider may also prescribe physical therapy.   Talk with a counselor, if your back pain is related to tension caused by emotional problems.   When the pain is gone, ask your healthcare provider about starting an exercise program such as the following:   Exercise moderately every day, using stretching and warm-up exercises suggested by your provider or physical therapist.   Exercise vigorously for about 30 minutes 3 times a week by walking, swimming, using a stationary bicycle, or doing low-impact aerobics.   Exercising regularly will not only help your back, it will also help keep you healthier overall.   How long will the effects last?   The effects of back pain last as long as the cause exists or until your body recovers from the strain, usually a day or two but sometimes weeks.   How can I take care of myself?   In addition to the treatment described above, keep in mind these suggestions:   Practice good posture. Stand with your head up, shoulders straight, chest forward, weight balanced evenly on both feet, and pelvis tucked in.   Lose weight if you are overweight   Keep your core muscles strong. These are your abdominal and back muscles.   Sleep without a pillow under your head.   Pain is the best way to  the pace you should set in increasing your activity and exercise. Minor discomfort, stiffness, soreness, and mild aches need not interfere with activity. However, limit your activities temporarily if:   Your symptoms return.   The pain increases when you are more active.   The pain increases within 24 hours after a new or higher level of activity.   When can I return to my normal activities?   Everyone recovers from an injury at a different rate. Return to your activities depends on how soon your back recovers, not by how many days or weeks it has been since your injury has occurred. In general, the longer you have symptoms before you start  treatment, the longer it will take to get better. The goal is to return to your normal activities as soon as is safely possible. If you return too soon you may worsen your injury.   It is important that you have fully recovered from your low back pain before you return to any strenuous activity. You must be able to have the same range of motion that you had before your injury. You must be able to walk and twist without pain.   What can I do to help prevent low back pain?   You can reduce the strain on your back by doing the following:   Don't push with your arms when you move a heavy object. Turn around and push backwards so the strain is taken by your legs.   Whenever you sit, sit in a straight-backed chair and hold your spine against the back of the chair.   Bend your knees and hips and keep your back straight when you lift a heavy object.   Avoid lifting heavy objects higher than your waist.   Hold packages you carry close to your body, with your arms bent.   Use a footrest for one foot when you stand or sit in one spot for a long time. This keeps your back straight.   Bend your knees when you bend over.   Sit close to the pedals when you drive and use your seat belt and a hard backrest or pillow.   Lie on your side with your knees bent when you sleep or rest. It may help to put a pillow between your knees.   Put a pillow under your knees when you sleep on your back.   Raise the foot of the bed 8 inches to discourage sleeping on your stomach unless you have other problems that require that you keep your head elevated.   To rest your back, hold each of these positions for 5?minutes or longer:   Lie on your back, bend your knees, and put pillows under your knees.   Lie on your back on the floor with a pillow under your neck. Bend your knees to a 90-degree angle, and put your lower legs and feet on a chair.   Lie on your back, bend your knees, and bring one knee up to your chest and hold it there. Repeat with the  other knee, then bring both knees to your chest. When holding your knee to your chest, grab your thigh rather than your lower leg to avoid over flexing your knee.     Published by [employer] and [facility].  This content is reviewed periodically and is subject to change as new health information becomes available. The information is intended to inform and educate and is not a replacement for medical evaluation, advice, diagnosis or treatment by a healthcare professional.   Developed by Francisca Zapata RN, [place], and [facility].   ? 2010 [facility] and/or its affiliates. All Rights Reserved.           Low Back Pain Exercise          Standing hamstring stretch: Put the heel of one leg on a stool about 15 inches high. Keep your leg straight. Lean forward, bending at the hips until you feel a mild stretch in the back of your thigh. Make sure you do not roll your shoulders or bend at the waist when doing this. You want to stretch your leg, not your lower back. Hold the stretch for 15 to 30 seconds. Repeat with each leg 3 times.   Cat and camel: Get down on your hands and knees. Let your stomach sag, allowing your back to curve downward. Hold this position for 5 seconds. Then arch your back and hold for 5 seconds. Do 3 sets of 10.   Quadruped arm and leg raise: Get down on your hands and knees. Pull in your belly button and tighten your abdominal muscles to stiffen your spine. While keeping your abdominals tight, raise one arm and the opposite leg away from you. Hold this position for 5 seconds. Lower your arm and leg slowly and change sides. Do this 10 times on each side.   Pelvic tilt: Lie on your back with your knees bent and your feet flat on the floor. Tighten your abdominal muscles and push your lower back into the floor. Hold this position for 5 seconds, then relax. Do 3 sets of 10.   Partial curl: Lie on your back with your knees bent and your feet flat on the floor. Tighten your stomach muscles. Tuck your chin to your chest.  With your hands stretched out in front of you, curl your upper body forward until your shoulders clear the floor. Hold this position for 3 seconds. Don't hold your breath. It helps to breathe out as you lift your shoulders up. Relax back to the floor. Repeat 10 times. Build to 3 sets of 10. To challenge yourself, clasp your hands behind your head and keep your elbows out to the side.   Gluteal stretch: Lie on your back with both knees bent. Rest the ankle of one leg over the knee of your other leg. Grasp the thigh of the bottom leg and pull toward your chest. You will feel a stretch along the buttocks and possibly along the outside of your hip. Hold the stretch for 15 to 30 seconds. Repeat 3 times with each leg.   Extension exercise:   0. Lie face down on the floor for 5 minutes. If this hurts too much, lie face down with a pillow under your stomach. This should relieve your leg or back pain. When you can lie on your stomach for 5 minutes without a pillow, you can continue with Part B of this exercise.   0. After lying on your stomach for 5 minutes, prop yourself up on your elbows for another 5 minutes. If you can do this without having more leg or buttock pain, you can start doing part C of this exercise.   0. Lie on your stomach with your hands under your shoulders. Then press down on your hands and extend your elbows while keeping your hips flat on the floor. Hold for 1 second and lower yourself to the floor. Do 3 to 5 sets of 10 repetitions. Rest for 1 minute between sets. You should have no pain in your legs when you do this, but it is normal to feel some pain in your lower back.   Do this exercise several times a day.   Side plank: Lie on your side with your legs, hips, and shoulders in a straight line. Prop yourself up onto your forearm so your elbow is directly under your shoulder. Lift your hips off the floor and balance on your forearm and the outside of your foot. Try to hold this position for 15 seconds,  then slowly lower your hip to the ground. Switch sides and repeat. Work up to holding for 1 minute or longer. This exercise can be made easier by starting with your knees and hips flexed toward your chest.   Published by [employer].  This content is reviewed periodically and is subject to change as new health information becomes available. The information is intended to inform and educate and is not a replacement for medical evaluation, advice, diagnosis or treatment by a healthcare professional.   Written by Corin Blanco, MS, PT, and Francisca Patel PT, [facility], [facility], for [facility]   ? 2010 [facility] and/or its affiliates. All Rights Reserved.         Copyright   [employer] 2011

## 2017-07-17 ENCOUNTER — TELEPHONE (OUTPATIENT)
Dept: FAMILY MEDICINE | Facility: OTHER | Age: 82
End: 2017-07-17

## 2017-07-17 DIAGNOSIS — S32.000A COMPRESSION FRACTURE OF LUMBAR VERTEBRA, CLOSED, INITIAL ENCOUNTER (H): Primary | ICD-10-CM

## 2017-07-17 DIAGNOSIS — M54.41 ACUTE BILATERAL LOW BACK PAIN WITH RIGHT-SIDED SCIATICA: ICD-10-CM

## 2017-07-17 NOTE — TELEPHONE ENCOUNTER
HomeCare calling back - given information below. They are wondering if they can cancel the homecare episode that is currently open or if they should go next week?    If [facility] doesn't want therapy, it would be a one time visit under Medicare. Unsure what Andie was meaning. If you do need to call her, her phone number is [phone].    Carina Sahu, CMA

## 2017-07-17 NOTE — TELEPHONE ENCOUNTER
Please call and get message for me.    Also, relay that we may need to postpone physical therapy aspect as patient's x-rays showed vertebral fracture until she sees neurosurgery.   I still would like them to do a home safety check to help assess if patient can still live on her own.   Home care should only call Natalie - patient's daughter. C2C on file.        Eduard Carrington PA-C  [facility]

## 2017-07-17 NOTE — TELEPHONE ENCOUNTER
C2C on file with daughter Natalie.   Called Natalie to discuss compression fractures on x-ray   Recommend follow up with spine specialist - [facility] [phone] given to Natalie Kent-TELMA Nielsen   Clinics - [place]

## 2017-07-21 ENCOUNTER — OFFICE VISIT (OUTPATIENT)
Dept: NEUROSURGERY | Facility: CLINIC | Age: 82
End: 2017-07-21
Payer: MEDICARE

## 2017-07-21 VITALS
WEIGHT: 104 LBS | BODY MASS INDEX: 21.01 KG/M2 | OXYGEN SATURATION: 95 % | RESPIRATION RATE: 18 BRPM | TEMPERATURE: 97 F | HEART RATE: 103 BPM

## 2017-07-21 DIAGNOSIS — S32.000A LUMBAR COMPRESSION FRACTURE, CLOSED, INITIAL ENCOUNTER (H): Primary | ICD-10-CM

## 2017-07-21 PROCEDURE — 99203 OFFICE O/P NEW LOW 30 MIN: CPT | Performed by: PHYSICIAN ASSISTANT

## 2017-07-21 ASSESSMENT — PAIN SCALES - GENERAL: PAINLEVEL: MODERATE PAIN (5)

## 2017-07-21 NOTE — NURSING NOTE
"Chief Complaint   Patient presents with     Neurologic Problem     Low back pain       Initial Pulse 103  Temp 97  F (36.1  C) (Temporal)  Resp 18  Wt 47.2 kg (104 lb)  SpO2 95%  BMI 21.01 kg/m2 Estimated body mass index is 21.01 kg/(m^2) as calculated from the following:    Height as of 11/29/16: 1.499 m (4' 11\").    Weight as of this encounter: 47.2 kg (104 lb).  Medication Reconciliation: complete     Angelic Barnes CMA        "

## 2017-07-21 NOTE — MR AVS SNAPSHOT
After Visit Summary   7/21/2017    Todd Clifford    MRN: [id number]           Patient Information     Date Of Birth          8/30/1934        Visit Information        Provider Department      7/21/2017 11:20 AM Itzel Rosenthal PA-C [facility]        Today's Diagnoses     Lumbar compression fracture, closed, initial encounter (H)    -  1      Care Instructions    Wear brace at all times    Follow-up in 6 weeks with xray prior          Follow-ups after your visit        Additional Services     ORTHOTICS REFERRAL       **This referral order prints off in the [facility]  (Orthotics & Prosthetics) Central Scheduling Office**    The [facility] Central Scheduling Staff will contact the patient to schedule appointments.     Central Scheduling Contact Information: [phone] ([place])    TLSO BRACE    Please be aware that coverage of these services is subject to the terms and limitations of your health insurance plan.  Call member services at your health plan with any benefit or coverage questions.      Please bring the following to your appointment:    >>   Any x-rays, CTs or MRIs which have been performed.  Contact the facility where they were done to arrange for  prior to your scheduled appointment.    >>   List of current medications   >>   This referral request   >>   Any documents/labs given to you for this referral                  Future tests that were ordered for you today     Open Future Orders        Priority Expected Expires Ordered    XR Lumbar Spine 2/3 Views Routine 9/1/2017 7/21/2018 7/21/2017            Who to contact     If you have questions or need follow up information about today's clinic visit or your schedule please contact [facility] directly at [phone].  Normal or non-critical lab and imaging results will be communicated to you by MyChart, letter or phone within 4 business days after the clinic has received  "the results. If you do not hear from us within 7 days, please contact the clinic through [employer] or phone. If you have a critical or abnormal lab result, we will notify you by phone as soon as possible.  Submit refill requests through [employer] or call your pharmacy and they will forward the refill request to us. Please allow 3 business days for your refill to be completed.          Additional Information About Your Visit        [employer] Information     [employer] lets you send messages to your doctor, view your test results, renew your prescriptions, schedule appointments and more. To sign up, go to [email or web address] . Click on \"Log in\" on the left side of the screen, which will take you to the Welcome page. Then click on \"Sign up Now\" on the right side of the page.     You will be asked to enter the access code listed below, as well as some personal information. Please follow the directions to create your username and password.     Your access code is: [email or web address]  Expires: 10/12/2017 12:36 PM     Your access code will  in 90 days. If you need help or a new code, please call your [facility] clinic or [phone].        Care EveryWhere ID     This is your Care EveryWhere ID. This could be used by other organizations to access your [facility] medical records  [id number]        Your Vitals Were     Pulse Temperature Respirations Pulse Oximetry BMI (Body Mass Index)       103 97  F (36.1  C) (Temporal) 18 95% 21.01 kg/m2        Blood Pressure from Last 3 Encounters:   17 146/88   17 (!) 171/92   17 158/80    Weight from Last 3 Encounters:   17 47.2 kg (104 lb)   17 47.1 kg (103 lb 12.8 oz)   17 49.4 kg (109 lb)              We Performed the Following     ORTHOTICS REFERRAL        Primary Care Provider Office Phone # Fax #    Zuly Carrington PA-C [phone]       [facility] [address] [facility] [address]  [facility] [address]        Equal " Access to Services     [facility]: Hadii austin trevino malcom Issa, waaxda luqadaha, qaybta kaalalcides lavernejackietrinidad, sam cheryshayythuy tatum. So [facility] [phone].    ATENCIÓN: Si gladys kennedy, tiene a silva disposición servicios gratuitos de asistencia lingüística. Llame al [phone].    We comply with applicable federal civil rights laws and [place] laws. We do not discriminate on the basis of race, color, national origin, age, disability sex, sexual orientation or gender identity.            Thank you!     Thank you for choosing [facility]  for your care. Our goal is always to provide you with excellent care. Hearing back from our patients is one way we can continue to improve our services. Please take a few minutes to complete the written survey that you may receive in the mail after your visit with us. Thank you!             Your Updated Medication List - Protect others around you: Learn how to safely use, store and throw away your medicines at www.disposemymeds.org.          This list is accurate as of: 7/21/17  1:47 PM.  Always use your most recent med list.                   Brand Name Dispense Instructions for use Diagnosis    ALPRAZolam 0.25 MG tablet    XANAX    60 tablet    TAKE ONE-HALF TO ONE TABLET BY MOUTH EVERY 6 HOURS AS NEEDED MAX  OF  2.5  TABLETS  PER  24  HRS.    Anxiety       amitriptyline 25 MG tablet    ELAVIL    60 tablet    Take 1 tablet (25 mg) by mouth 2 times daily    Psychophysiological insomnia, Major depressive disorder, recurrent episode, mild (H), Anxiety       cyclobenzaprine 5 MG tablet    FLEXERIL    30 tablet    Take 0.5-1 tablets (2.5-5 mg) by mouth nightly as needed for muscle spasms    Acute bilateral low back pain with right-sided sciatica       lisinopril 20 MG tablet    PRINIVIL/ZESTRIL    90 tablet    Take 1 tablet (20 mg) by mouth daily    Hypertension goal BP (blood pressure) < 140/80       melatonin 5 MG tablet    MELATONIN MAXIMUM  STRENGTH    90 tablet    Take 1 tablet (5 mg) by mouth nightly as needed for sleep    Psychophysiological insomnia       methylPREDNISolone 4 MG tablet    MEDROL DOSEPAK    21 tablet    Follow package instructions    Acute bilateral low back pain with right-sided sciatica       naproxen 500 MG tablet    NAPROSYN    60 tablet    Take 1 tablet (500 mg) by mouth 2 times daily as needed for moderate pain    Acute bilateral low back pain with right-sided sciatica       senna 8.6 MG tablet    SENOKOT    90 tablet    Take 1 tablet by mouth daily    Chronic constipation

## 2017-07-21 NOTE — PROGRESS NOTES
"Todd Clifford is a 82 year old female who presents for:  Chief Complaint   Patient presents with     Neurologic Problem     Low back pain        Initial Vitals:  There were no vitals taken for this visit. Estimated body mass index is 20.97 kg/(m^2) as calculated from the following:    Height as of 11/29/16: 1.499 m (4' 11\").    Weight as of 7/14/17: 47.1 kg (103 lb 12.8 oz).. There is no height or weight on file to calculate BSA. BP completed using cuff size: NA (Not Taken)  Data Unavailable    Do you feel safe in your environment?  Yes  Do you need any refills today? No    Nursing Comments: Pt unable to stand for weight and height. Hard of hearing. Communicate with daughter.        Angelic Barnes  "

## 2017-07-21 NOTE — PROGRESS NOTES
"Dr. Collins Roldan  [facility]  Neurosurgery Clinic Visit      CC: Low back pain    Primary care Provider: Zuly Carrington      Reason For Visit:   I was asked by Zuly Carrington to consult on the patient for low back pain.      HPI: Todd Clifford is a 82 year old female who presents for evaluation of low back pain. Pain is located in bilateral low back and describes the pain as intermittent, sharp and stabbing. She was previously having radicular pain down the right anterior leg to the toes, but that has since resolved. Too cold of temperatures, quick movements, or walking causes the pain to worsen. Being seated in a flat chair causes the pain to lessen. She has also felt weak in the lower extremities. Denies bladder or bowel incontinence.  Current pain: 10/10 At worst: 10/10    Past Medical History:   Diagnosis Date     Anxiety      Hearing loss      Hypertension        Past Medical History reviewed with patient during visit.    No past surgical history on file.  Past Surgical History reviewed with patient during visit.    Current Outpatient Prescriptions   Medication     methylPREDNISolone (MEDROL DOSEPAK) 4 MG tablet     ALPRAZolam (XANAX) 0.25 MG tablet     lisinopril (PRINIVIL/ZESTRIL) 20 MG tablet     amitriptyline (ELAVIL) 25 MG tablet     naproxen (NAPROSYN) 500 MG tablet     cyclobenzaprine (FLEXERIL) 5 MG tablet     melatonin (MELATONIN MAXIMUM STRENGTH) 5 MG tablet     senna (SENOKOT) 8.6 MG tablet     No current facility-administered medications for this visit.        Allergies   Allergen Reactions     Prozac [Fluoxetine]      \"roaring in my head\"   nervous       Social History     Social History     Marital status:      Spouse name: N/A     Number of children: N/A     Years of education: N/A     Social History Main Topics     Smoking status: Current Every Day Smoker     Packs/day: 1.00     Types: Cigarettes     Smokeless tobacco: Never Used     " Alcohol use No     Drug use: No     Sexual activity: Not Currently     Partners: Male     Other Topics Concern     Not on file     Social History Narrative       Family History   Problem Relation Age of Onset     C.A.D. Mother      Hypertension Mother      C.A.D. Father      Hypertension Father      Hypertension Brother      DIABETES Daughter           ROS: 10 point ROS neg other than the symptoms noted above in the HPI.    Vital Signs: There were no vitals taken for this visit.    Examination:  Constitutional:  Alert, well nourished, NAD.  HEENT: Normocephalic, atraumatic.   Pulmonary:  Without shortness of breath, normal effort.   Lymph: no lymphadenopathy to low back or LE.   Integumentary: Skin is free of rashes or lesions.   Cardiovascular:  No pitting edema of BLE.      Neurological:  Awake  Alert  Oriented x 3  Speech clear  Cranial nerves II - XII grossly intact  PERRL  EOMI  Face symmetric  Tongue midline  Motor exam   Hip Flexor:                Right: 5/5  Left:  5/5  Hip Adductor:             Right:  5/5  Left:  5/5  Hip Abductor:             Right:  5/5  Left:  5/5  Gastroc Soleus:        Right:  5/5  Left:  5/5  Tib/Ant:                      Right:  5/5  Left:  5/5  EHL:                          Right:  5/5  Left:  5/5       Sensation normal to bilateral upper and lower extremities.    Reflexes are 2+ in the patellar and Achilles. There is no clonus. Downgoing Babinski.    Musculoskeletal:  Seated in wheelchair. Unable to ambulate due to pain.  Lumbar examination reveals no tenderness of the spine and paraspinous muscles bilaterally.  Hip height is symmetrical. Negative SI joint, sciatic notch or greater trochanteric tenderness to palpation bilaterally.       Imaging:   LUMBAR SPINE TWO TO THREE VIEWS   7/14/2017 12:49 PM      HISTORY: Low back pain 3+ months. Lumbago with sciatica, right side.     COMPARISON: Chest x-rays dated 4/14/2017.     FINDINGS:  There are five non-rib bearing lumbar type  vertebrae. Near vertebral plana is seen at L1 and approximately 70% compression fracture is seen at L2 centrally. Approximate 40% anterior compression of L4 and L5 are noted. Anterolisthesis of L4 on L5 measures approximately 1.1 cm. Facet arthropathy seen throughout lumbar spine, but is worst from L4 through S1. Aortic atherosclerosis is noted.  Pedicles appear grossly intact. There is a nonspecific bowel gas  pattern.     IMPRESSION:  1. Compression fractures of L1, L2, L4, and L5. The compression fractures of L1 and L2 appear new or progressed since the prior chest x-rays. Compression fractures of L4 and L5 are of indeterminate age.  2. Grade 1-2 anterolisthesis of L4 on L5.  3. Aortic atherosclerosis.  4. Nonspecific bowel gas pattern.     GREGORIA BECKER MD    Assessment/Plan:   Todd Clifford is a 82 year old female ho presents for evaluation of low back pain. Pain is located in bilateral low back and describes the pain as intermittent, sharp and stabbing. Xray's reviewed in detail with patient and given her significant lumbar fractures, I have ordered her a TLSO brace and will have her follow-up in 6 weeks with xray prior to determine healing. Patient voiced understanding and agreement.           Itzel Rosenthal PA-C  [facility]  [address]  [address] [place] [id number]    Tel [phone]  Pager [phone]

## 2017-07-25 ENCOUNTER — TELEPHONE (OUTPATIENT)
Dept: FAMILY MEDICINE | Facility: OTHER | Age: 82
End: 2017-07-25

## 2017-07-25 NOTE — TELEPHONE ENCOUNTER
[facility] PT called and states that patient is declining home care. She was seen last Friday by her spine doctor and was told she has compression fractures in her spine and did not need PT.  Patient states she is doing fine and does not need the PT.

## 2017-07-26 ENCOUNTER — DOCUMENTATION ONLY (OUTPATIENT)
Dept: ORTHOPEDICS | Facility: CLINIC | Age: 82
End: 2017-07-26

## 2017-07-26 NOTE — PROGRESS NOTES
S: I saw Preeti Clifford at the [facility] clinic location for fitting and delivery of a custom fabricated TLSO per order and instruction of Itzel Rosenthal PA-C. She was accompanied by her daughter. She described pain when standing or walking and improved comfort only when seated in a firm chair. Michael Lamas MD diagnosed fractures of L1-5.  O/g: Custom TLSO brace is to provide improved support and comfort for lumbar vertebral fractures.  A: I have fit a custom fabricated TLSO spinal orthosis. I trimmed the brae under the axillae to prevent potential skin irritation in this area. I have noted a tendency for the brace to slide up and have ordered an additional abdomen binder to add to the brace. I have scheduled follow up in [place] next Tuesday to add this to the brace. I have provided use and care instruction to Preeti Esquivle and her daughter.  P: Follow up scheduled in [place] on Tuesday, August 1st at 12:00.

## 2017-07-27 ENCOUNTER — TELEPHONE (OUTPATIENT)
Dept: FAMILY MEDICINE | Facility: OTHER | Age: 82
End: 2017-07-27

## 2017-07-27 NOTE — TELEPHONE ENCOUNTER
Received a message from orthotics who fitted patient for a back brace due to her compression fractures. Concerns with her being able to get it on.     This morning I spoke with Gali, patient's daughter (C2C on file). Between Natalie, Natalie's , and a cousin who is a RN, they believe they will be able to get her brace on and off (as well as patient bathed). They will try this for a couple weeks, if they are unable, Natalie will call and I will place home care orders. At that time, I will also place order for safety evaluation as both Natalie and myself have concerns about patient living alone. However, last time this was discussed and tried patient refused to let anyone in her home.     Eduard Carrington PA-C   [facility]

## 2017-08-22 ENCOUNTER — TELEPHONE (OUTPATIENT)
Dept: FAMILY MEDICINE | Facility: OTHER | Age: 82
End: 2017-08-22

## 2017-08-22 DIAGNOSIS — F41.9 ANXIETY: ICD-10-CM

## 2017-08-22 RX ORDER — ALPRAZOLAM 0.25 MG
TABLET ORAL
Qty: 60 TABLET | Refills: 0 | Status: SHIPPED | OUTPATIENT
Start: 2017-08-22 | End: 2017-10-12

## 2017-08-22 NOTE — TELEPHONE ENCOUNTER
Spoke with pt, she would like this mailed to [facility] so she can have someone pick it up.     Spoke with Gali, she said that is ok but is not currently speaking to her mom.     Mailed to [facility] today.

## 2017-08-22 NOTE — TELEPHONE ENCOUNTER
Attempted to reach via home number, phone rang continuously. Attempted Gali as there is a C2C on file. Lm for Gali. Nicolette Pollard RN, BSN

## 2017-08-22 NOTE — TELEPHONE ENCOUNTER
Xanax           Last Written Prescription Date: 07/14/2017  Last Fill Quantity: 60, # refills: 0    Last Office Visit with [facility] or [facility] prescribing provider:  07/14/2017   Future Office Visit:       Lab Results   Component Value Date    WBC 6.1 04/14/2017     Lab Results   Component Value Date    RBC 4.23 04/14/2017     Lab Results   Component Value Date    HGB 13.3 04/14/2017     Lab Results   Component Value Date    HCT 39.5 04/14/2017     No components found for: MCT  Lab Results   Component Value Date    MCV 93 04/14/2017     Lab Results   Component Value Date    MCH 31.4 04/14/2017     Lab Results   Component Value Date    MCHC 33.7 04/14/2017     Lab Results   Component Value Date    RDW 12.6 04/14/2017     Lab Results   Component Value Date     04/14/2017     Lab Results   Component Value Date    AST 33 04/14/2017     Lab Results   Component Value Date    ALT 45 04/14/2017     Creatinine   Date Value Ref Range Status   04/14/2017 0.60 0.52 - 1.04 mg/dL Final   ]      Lawrence Vines MA  August 22, 2017

## 2017-08-22 NOTE — TELEPHONE ENCOUNTER
Reason for call:  Medication  Reason for Call:  Medication or medication refill:    Do you use a [facility]?  Name of the pharmacy and phone number for the current request:  [facility] - [phone]    Name of the medication requested: xanax    Other request: will need refill. Declined calling pharmacy to fax    Can we leave a detailed message on this number? YES    Phone number patient can be reached at: Home number on file [phone] (home)    Best Time: any    Call taken on 8/22/2017 at 7:32 AM by Brinda Bird

## 2017-08-25 ENCOUNTER — TELEPHONE (OUTPATIENT)
Dept: FAMILY MEDICINE | Facility: OTHER | Age: 82
End: 2017-08-25

## 2017-08-25 NOTE — TELEPHONE ENCOUNTER
I called [facility], they just got their mail and it was in there.  They will fill it for her. Notified pt.

## 2017-08-25 NOTE — TELEPHONE ENCOUNTER
Patient stating she just spoke to [facility] and they did not receive this Rx in the mail.   She is requesting we contact them.

## 2017-09-05 ENCOUNTER — TELEPHONE (OUTPATIENT)
Dept: FAMILY MEDICINE | Facility: OTHER | Age: 82
End: 2017-09-05

## 2017-09-05 NOTE — TELEPHONE ENCOUNTER
Please notify patient's daughter that I will try to call her Thursday AM (prior to appointment) as it is end of day today and I am not in clinic tomorrow. Otherwise we can speak outside of the exam room.    Eduard Carrington PA-C  [facility]

## 2017-09-05 NOTE — TELEPHONE ENCOUNTER
Spoke with daughter. She will plan on that call on Thursday.     Routing back to [facility] as a reminder

## 2017-09-05 NOTE — PROGRESS NOTES
"  SUBJECTIVE:                                                    Todd Clifford is a 83 year old female who presents to clinic today with niece for the following health issues:      HPI      Back Pain     Duration: ongoing for 4-5 months        Specific cause: none    Description:   Location of pain: low back both  Character of pain: sharp  Pain radiation:radiates into the right buttocks, radiates into the right leg, radiates into the left buttocks and radiates into the left leg  New numbness or weakness in legs, not attributed to pain:  no     Intensity: Currently 10/10    History:   Pain interferes with job: Not applicable  History of back problems: 5 broken vertebrae   Any previous MRI or X-rays: Yes-  Sees a specialist for back pain:  No  Therapies tried without relief:     Alleviating factors:   Improved by: None      Precipitating factors:  Worsened by: Standing and Coughing    Functional and Psychosocial Screen (Arabella STarT Back):      Not performed today      Accompanying Signs & Symptoms:  Risk of Fracture:  Osteoporosis and Age >64  Risk of Cauda Equina:  None  Risk of Infection:  None  Risk of Cancer:  None  Risk of Ankylosing Spondylitis:  Onset at age <35, male, AND morning back stiffness. no     - Twisted (couple weeks ago) and felt worse back pain, feels moved too fast     Per niece, increased pain worsening   - Hasn't had a bath since got brace, refuses to take off since afraid won't get back on right   - Occasional sponge bath  - Won't let daughter help her, states \"bossy\" and threatening to go to a    - Family in agreement needs help  - Keeps saying needs to go hospital but then changes mind due to pain   - Won't even use walker to open her front door   - Admits using walker and walking on her own   - Irrational, angry, won't talk to family  - \"No one in [place] knew my  had dementia until it was too bad, i'm not getting locked up\"   - \"This brace cost me $4,000, insurance covered " "but cost me $320 and didn't even help\"  - Per niece       Doesn't eat, has someone to bring in groceries and they are just piled up, her freezer is full, will maybe eat 1-2 bites of a toaster strudel per day       When she visits, will only sit in living room 30 min, then needs to lay down due to pain       Called several times in the past weeks saying wanted to go to ED because pain was so bad, but by the time gets there she says fine and doesn't want to leave house      Never leaves house except to go to DR       Very demanding       Will no longer let her daughter in, daughter loves her mom and is very concerned for her well-being, all the things patient is saying are just not true        She continues to think that niece (Sinai) and her brother (Mark - who is 85 years old) will care for her, but Sinai is having back surgery in two weeks and won't be able to        States even when she goes over there has to mentally prepare because she is so angry and belligerent all the time, she also gets on these tangents where she just rants about everyone in her life (lies)    - Called and spoke to daughter who was in tears over her mother      States just doesn't know how to help her      Is now refusing to talk to her or let her in the home, has to hear everything from Sinai (cousin, niece to patient)      States her mother is so stressful that her blood sugars rise whenever she is there      Is afraid since her mother isn't eating      Afraid she will find her dead one day      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  Burning with urination and frequency.     URINARY TRACT SYMPTOMS  Onset: Few weeks     Description:   Painful urination (Dysuria): YES  Blood in urine (Hematuria): no   Delay in urine (Hesitency): YES    Intensity: " moderate    Progression of Symptoms:  worsening    Accompanying Signs & Symptoms:  Fever/chills: no   Flank pain no   Nausea and vomiting: no   Any vaginal symptoms: none  Abdominal/Pelvic Pain: no     History:   History of frequent UTI's: no   History of kidney stones: no   Sexually Active: no   Possibility of pregnancy: No    Precipitating factors:   Therapies Tried and outcome: None         Problem list and histories reviewed & adjusted, as indicated.  Additional history: as documented    ROS:  Constitutional, HEENT, cardiovascular, pulmonary, gi and gu systems are negative, except as otherwise noted.      OBJECTIVE:   /84 (BP Location: Left arm, Patient Position: Chair, Cuff Size: Adult Regular)  Pulse 108  Temp 98.3  F (36.8  C) (Oral)  Resp 16  SpO2 96%  There is no height or weight on file to calculate BMI.  GENERAL APPEARANCE: healthy, alert and no distress  EYES: Eyes grossly normal to inspection, PERRLA, conjunctivae and sclerae without injection or discharge, EOM intact   ABD: Declined by patient   MS: No musculoskeletal defects, patient in back brace and in wheelchair, frail   SKIN: No suspicious lesions or rashes, hydration status appears adeuqate with normal skin turgor   NEURO: Declined by patient   BACK: Declined by patient   PSYCH: Alert and oriented x3; speech- coherent , normal rate and volume; able to articulate logical thoughts, able to abstract reason, no tangential thoughts, no hallucinations or delusions, mentation appears normal, Mood is angry. Affect is appropriate for this mood state and bright. Thought content is free of suicidal ideation, hallucinations, and delusions. Dress is adequate and upkept. Eye contact is good during conversation.       Diagnostic Test Results:    LUMBAR SPINE TWO-THREE VIEWS   9/7/2017 9:40 AM      HISTORY: Worsening pain, wearing brace for lumbar compression  fracture. Wedge compression fracture of unspecified lumbar vertebra,  initial encounter for  closed fracture. Lumbago with sciatica, right  side.     COMPARISON: Lumbar spine x-rays dated 7/14/2017 and chest x-rays dated  4/14/2017.     FINDINGS: There are five non-rib bearing lumbar type vertebrae.  Compression fractures of L1, L2, L4 and L5 similar to the prior study.  There is new compression of T11 measuring approximately 30% of the  central vertebral body height. There is compression fracture of T9  which may have been present previously but appears slightly worsened.  Compression fracture of T8 was seen on prior chest x-rays from  4/14/2017. There is anterolisthesis of L4 on L5 again measuring  approximately 0.8 cm. There is L4-5 disc height loss. Remaining disc  spaces are grossly maintained. Facet arthropathy is seen from L4  through S1, worst at L5-S1. Moderate amount of stool is seen in the  colon in the pelvis. There is diffuse osteopenia.         IMPRESSION:  1. New compression of T11 since the prior lumbar spine series dated  7/14/2017.  2. Otherwise stable lumbar spine compression fractures,  anterolisthesis of L4 on L5, degenerative disc disease from L3 through  L5, diffuse osteopenia and facet arthropathy of the lower lumbar  spine. Compression fractures of visualized portions of the lower  thoracic spine are otherwise stable.  3. Arterial calcifications are again noted.      Results for orders placed or performed in visit on 09/07/17 (from the past 24 hour(s))   *UA reflex to Microscopic   Result Value Ref Range    Color Urine Yellow     Appearance Urine Clear     Glucose Urine Negative NEG^Negative mg/dL    Bilirubin Urine Negative NEG^Negative    Ketones Urine Negative NEG^Negative mg/dL    Specific Gravity Urine 1.015 1.003 - 1.035    Blood Urine Trace (A) NEG^Negative    pH Urine 7.0 5.0 - 7.0 pH    Protein Albumin Urine Negative NEG^Negative mg/dL    Urobilinogen Urine 0.2 0.2 - 1.0 EU/dL    Nitrite Urine Negative NEG^Negative    Leukocyte Esterase Urine Large (A) NEG^Negative     Source Unspecified Urine    Urine Microscopic   Result Value Ref Range    WBC Urine 10-25 (A) OTO2^O - 2 /HPF    RBC Urine 2-5 (A) OTO2^O - 2 /HPF    Bacteria Urine Moderate (A) NEG^Negative /HPF   CBC with platelets   Result Value Ref Range    WBC 5.3 4.0 - 11.0 10e9/L    RBC Count 4.14 3.8 - 5.2 10e12/L    Hemoglobin 13.1 11.7 - 15.7 g/dL    Hematocrit 40.6 35.0 - 47.0 %    MCV 98 78 - 100 fl    MCH 31.6 26.5 - 33.0 pg    MCHC 32.3 31.5 - 36.5 g/dL    RDW 13.0 10.0 - 15.0 %    Platelet Count 281 150 - 450 10e9/L         ASSESSMENT/PLAN:       ICD-10-CM    1. Compression fracture of lumbar vertebra, closed, initial encounter (H) S32.000A XR Lumbar Spine 2/3 Views     Urine Microscopic     CARE COORDINATION REFERRAL   2. Acute bilateral low back pain with right-sided sciatica M54.41 XR Lumbar Spine 2/3 Views     CARE COORDINATION REFERRAL   3. Anxiety F41.9 CARE COORDINATION REFERRAL   4. Mild major depression (H) F32.0 CARE COORDINATION REFERRAL   5. Dysuria R30.0 *UA reflex to Microscopic     Urine Culture Aerobic Bacterial     CARE COORDINATION REFERRAL   6. Acute cystitis with hematuria N30.01 CBC with platelets     Comprehensive metabolic panel     ciprofloxacin (CIPRO) 250 MG tablet     - Spent a long time with patient discussing her condition today, new fractures and no improvement of previous, now totaling 7 compression fractures  - Continues to decline home health care and discussed her daughter, brother, and niece can no longer care for her   - Discussed how lack of nutrition and eating can effect her healing   - Discussed that I recommend ED visit for pain control and possible hospitalization to allow her spine to heal, she declined all this along with home care   - Will reach out to her neurosurgery team to see if they can advise further   - Also discussed lab findings of UTI, CBC did not show signs of sepsis     Patient with a history of not taking medications I prescribe for her, discussed if she  didn't take her antibiotics, could end up septic and in the hospital      Patient did agree to take the script for Cipro and promised to take it      Continued to insist can care for herself and that she's not in any pain   - Asked by her daughter (via phone) and niece (who was present) to discuss nursing home, patient adamantly refuses this and then got very angry, stating we were all out to get her   - Ask DEANNE Yan to come in and help discuss with patient      We presented options of ED, home care, or that I would need to call the [facility]      Patient stated she would think about it      I continued to discuss with patient and niece that she is showing signs that she can not care for herself - not bathing, not eating, etc.      Asked her to at least let someone from [facility] in 1 time, she states she would think about it only if her niece was present (will no  - I have tried several times to get patient to take something for obvious anxiety and depression, she will try for a few days and then quit, will only take her Xanax, refuses to see counseling or psychiatry for mental health evaluation, patient definitely has some social anxiety - per daughter this isn't new, was present even when daughter was a child     - Due to all of this above, I have significant concerns for patient's well being     Called [facility] vulnerable adult and filed report (Report #[id number])     - Also called after patient left, discussed options with daughter Natalie, who is POA, who is in agreement with plan to have [facility] get involved in getting the patient the help she needs       Follow up: 2 weeks for urine recheck and pending advice from [facility] and neurosurgery team     A total of 60 minutes was spent with the patient today, with greater than 50% of the visit involving counseling and coordination of care.        Zuly Carrington PA-C  [facility]

## 2017-09-05 NOTE — TELEPHONE ENCOUNTER
Reason for Call:  Other call back    Detailed comments: Daughter calling to talk with PCP to go over appt before they come in on Thursday. Patient thinks she has another broken vertebrae and if she should be put in a nursing home or not. Patient daughter is very concerned about her living alone.     Phone Number Patient can be reached at: Other phone number:      Best Time: any    Can we leave a detailed message on this number? YES    Call taken on 9/5/2017 at 4:44 PM by Leila Alves

## 2017-09-07 ENCOUNTER — CARE COORDINATION (OUTPATIENT)
Dept: CARE COORDINATION | Facility: CLINIC | Age: 82
End: 2017-09-07

## 2017-09-07 ENCOUNTER — OFFICE VISIT (OUTPATIENT)
Dept: FAMILY MEDICINE | Facility: OTHER | Age: 82
End: 2017-09-07
Payer: MEDICARE

## 2017-09-07 ENCOUNTER — TELEPHONE (OUTPATIENT)
Dept: FAMILY MEDICINE | Facility: OTHER | Age: 82
End: 2017-09-07

## 2017-09-07 ENCOUNTER — RADIANT APPOINTMENT (OUTPATIENT)
Dept: GENERAL RADIOLOGY | Facility: OTHER | Age: 82
End: 2017-09-07
Attending: PHYSICIAN ASSISTANT
Payer: MEDICARE

## 2017-09-07 VITALS
DIASTOLIC BLOOD PRESSURE: 84 MMHG | OXYGEN SATURATION: 96 % | SYSTOLIC BLOOD PRESSURE: 150 MMHG | TEMPERATURE: 98.3 F | HEART RATE: 108 BPM | RESPIRATION RATE: 16 BRPM

## 2017-09-07 DIAGNOSIS — F41.9 ANXIETY: ICD-10-CM

## 2017-09-07 DIAGNOSIS — M54.41 ACUTE BILATERAL LOW BACK PAIN WITH RIGHT-SIDED SCIATICA: ICD-10-CM

## 2017-09-07 DIAGNOSIS — F32.0 MILD MAJOR DEPRESSION (H): ICD-10-CM

## 2017-09-07 DIAGNOSIS — N30.01 ACUTE CYSTITIS WITH HEMATURIA: ICD-10-CM

## 2017-09-07 DIAGNOSIS — R30.0 DYSURIA: ICD-10-CM

## 2017-09-07 DIAGNOSIS — S32.000A COMPRESSION FRACTURE OF LUMBAR VERTEBRA, CLOSED, INITIAL ENCOUNTER (H): ICD-10-CM

## 2017-09-07 DIAGNOSIS — S32.000A COMPRESSION FRACTURE OF LUMBAR VERTEBRA, CLOSED, INITIAL ENCOUNTER (H): Primary | ICD-10-CM

## 2017-09-07 LAB
ALBUMIN SERPL-MCNC: 3.8 G/DL (ref 3.4–5)
ALBUMIN UR-MCNC: NEGATIVE MG/DL
ALP SERPL-CCNC: 205 U/L (ref 40–150)
ALT SERPL W P-5'-P-CCNC: 19 U/L (ref 0–50)
ANION GAP SERPL CALCULATED.3IONS-SCNC: 8 MMOL/L (ref 3–14)
APPEARANCE UR: CLEAR
AST SERPL W P-5'-P-CCNC: 15 U/L (ref 0–45)
BACTERIA #/AREA URNS HPF: ABNORMAL /HPF
BILIRUB SERPL-MCNC: 0.3 MG/DL (ref 0.2–1.3)
BILIRUB UR QL STRIP: NEGATIVE
BUN SERPL-MCNC: 11 MG/DL (ref 7–30)
CALCIUM SERPL-MCNC: 9.3 MG/DL (ref 8.5–10.1)
CHLORIDE SERPL-SCNC: 98 MMOL/L (ref 94–109)
CO2 SERPL-SCNC: 29 MMOL/L (ref 20–32)
COLOR UR AUTO: YELLOW
CREAT SERPL-MCNC: 0.67 MG/DL (ref 0.52–1.04)
ERYTHROCYTE [DISTWIDTH] IN BLOOD BY AUTOMATED COUNT: 13 % (ref 10–15)
GFR SERPL CREATININE-BSD FRML MDRD: 84 ML/MIN/1.7M2
GLUCOSE SERPL-MCNC: 103 MG/DL (ref 70–99)
GLUCOSE UR STRIP-MCNC: NEGATIVE MG/DL
HCT VFR BLD AUTO: 40.6 % (ref 35–47)
HGB BLD-MCNC: 13.1 G/DL (ref 11.7–15.7)
HGB UR QL STRIP: ABNORMAL
KETONES UR STRIP-MCNC: NEGATIVE MG/DL
LEUKOCYTE ESTERASE UR QL STRIP: ABNORMAL
MCH RBC QN AUTO: 31.6 PG (ref 26.5–33)
MCHC RBC AUTO-ENTMCNC: 32.3 G/DL (ref 31.5–36.5)
MCV RBC AUTO: 98 FL (ref 78–100)
NITRATE UR QL: NEGATIVE
PH UR STRIP: 7 PH (ref 5–7)
PLATELET # BLD AUTO: 281 10E9/L (ref 150–450)
POTASSIUM SERPL-SCNC: 4.2 MMOL/L (ref 3.4–5.3)
PROT SERPL-MCNC: 7.2 G/DL (ref 6.8–8.8)
RBC # BLD AUTO: 4.14 10E12/L (ref 3.8–5.2)
RBC #/AREA URNS AUTO: ABNORMAL /HPF
SODIUM SERPL-SCNC: 135 MMOL/L (ref 133–144)
SOURCE: ABNORMAL
SP GR UR STRIP: 1.01 (ref 1–1.03)
UROBILINOGEN UR STRIP-ACNC: 0.2 EU/DL (ref 0.2–1)
WBC # BLD AUTO: 5.3 10E9/L (ref 4–11)
WBC #/AREA URNS AUTO: ABNORMAL /HPF

## 2017-09-07 PROCEDURE — 36415 COLL VENOUS BLD VENIPUNCTURE: CPT | Performed by: PHYSICIAN ASSISTANT

## 2017-09-07 PROCEDURE — 99215 OFFICE O/P EST HI 40 MIN: CPT | Performed by: PHYSICIAN ASSISTANT

## 2017-09-07 PROCEDURE — 81001 URINALYSIS AUTO W/SCOPE: CPT | Performed by: PHYSICIAN ASSISTANT

## 2017-09-07 PROCEDURE — 85027 COMPLETE CBC AUTOMATED: CPT | Performed by: PHYSICIAN ASSISTANT

## 2017-09-07 PROCEDURE — 87086 URINE CULTURE/COLONY COUNT: CPT | Performed by: PHYSICIAN ASSISTANT

## 2017-09-07 PROCEDURE — 72100 X-RAY EXAM L-S SPINE 2/3 VWS: CPT

## 2017-09-07 PROCEDURE — 80053 COMPREHEN METABOLIC PANEL: CPT | Performed by: PHYSICIAN ASSISTANT

## 2017-09-07 RX ORDER — CIPROFLOXACIN 250 MG/1
250 TABLET, FILM COATED ORAL 2 TIMES DAILY
Qty: 14 TABLET | Refills: 0 | Status: SHIPPED | OUTPATIENT
Start: 2017-09-07 | End: 2017-09-14

## 2017-09-07 ASSESSMENT — PAIN SCALES - GENERAL: PAINLEVEL: WORST PAIN (10)

## 2017-09-07 ASSESSMENT — PATIENT HEALTH QUESTIONNAIRE - PHQ9: SUM OF ALL RESPONSES TO PHQ QUESTIONS 1-9: 16

## 2017-09-07 NOTE — TELEPHONE ENCOUNTER
Please call patient and patient's daughter Natalie     - I would like to see her back in 2 weeks to recheck the urine     - I spoke with Itzel Rosenthal, her neurosurgery PA, they would like her to come back in for a recheck and to discuss new fractures ASAP    Thanks    Eduard Carrington PA-C  [facility]

## 2017-09-07 NOTE — MR AVS SNAPSHOT
After Visit Summary   9/7/2017    Todd Clifford    MRN: [id number]           Patient Information     Date Of Birth          8/30/1934        Visit Information        Provider Department      9/7/2017 9:00 AM Zuly Carrington PA-C [facility]        Today's Diagnoses     Compression fracture of lumbar vertebra, closed, initial encounter (H)    -  1    Acute bilateral low back pain with right-sided sciatica        Anxiety        Mild major depression (H)        Dysuria        Acute cystitis with hematuria          Care Instructions    - Take Cipro - 250 mg (1 tablet) twice a day for 7 days                   Follow-ups after your visit        Additional Services     CARE COORDINATION REFERRAL       Services are provided by a Care Coordinator for people with complex needs such as: medical, social, or financial troubles.  The Care Coordinator works with the patient and their Primary Care Provider to determine health goals, obtain resources, achieve outcomes, and develop care plans that help coordinate the patient's care.     Reason for Referral: Caregiver Concerns and Concerns with ADLs/IADLs    Provide additional details for Care Coordination to best meet the patient's current needs:     Clinical Staff have discussed the Care Coordination Referral with the patient and/or caregiver: yes                  Who to contact     If you have questions or need follow up information about today's clinic visit or your schedule please contact [facility] directly at [phone].  Normal or non-critical lab and imaging results will be communicated to you by MyChart, letter or phone within 4 business days after the clinic has received the results. If you do not hear from us within 7 days, please contact the clinic through [employer] or phone. If you have a critical or abnormal lab result, we will notify you by phone as soon as possible.  Submit refill requests through [employer]  "or call your pharmacy and they will forward the refill request to us. Please allow 3 business days for your refill to be completed.          Additional Information About Your Visit        MyChart Information     [employer] lets you send messages to your doctor, view your test results, renew your prescriptions, schedule appointments and more. To sign up, go to [email or web address] . Click on \"Log in\" on the left side of the screen, which will take you to the Welcome page. Then click on \"Sign up Now\" on the right side of the page.     You will be asked to enter the access code listed below, as well as some personal information. Please follow the directions to create your username and password.     Your access code is: [email or web address]  Expires: 10/12/2017 12:36 PM     Your access code will  in 90 days. If you need help or a new code, please call your [place] clinic or [phone].        Care EveryWhere ID     This is your Care EveryWhere ID. This could be used by other organizations to access your [place] medical records  [id number]        Your Vitals Were     Pulse Temperature Respirations Pulse Oximetry          108 98.3  F (36.8  C) (Oral) 16 96%         Blood Pressure from Last 3 Encounters:   17 150/84   17 146/88   17 (!) 171/92    Weight from Last 3 Encounters:   17 104 lb (47.2 kg)   17 103 lb 12.8 oz (47.1 kg)   17 109 lb (49.4 kg)              We Performed the Following     *UA reflex to Microscopic     CARE COORDINATION REFERRAL     CBC with platelets     Comprehensive metabolic panel     Urine Culture Aerobic Bacterial     Urine Microscopic          Today's Medication Changes          These changes are accurate as of: 17 10:30 AM.  If you have any questions, ask your nurse or doctor.               Start taking these medicines.        Dose/Directions    ciprofloxacin 250 MG tablet   Commonly known as:  CIPRO   Used for:  Acute cystitis with hematuria   Started " by:  Zuly Carrington PA-C        Dose:  250 mg   Take 1 tablet (250 mg) by mouth 2 times daily for 7 days   Quantity:  14 tablet   Refills:  0            Where to get your medicines      These medications were sent to [facility] [address] [facility] [address] [facility]  [address] [facility] [address]     Phone:  [phone]     ciprofloxacin 250 MG tablet                Primary Care Provider Office Phone # Fax #    Zuly Carrington PA-C [phone]       [address] [place] [address] [address]  [facility] [address]        Equal Access to Services     [facility]: Hadii austin trevino hadasho Soomaali, waaxda luqadaha, qaybta kaalmada adeafricayada, sam otto . So [facility] [phone].    ATENCIÓN: Si habla español, tiene a silva disposición servicios gratuitos de asistencia lingüística. [facility] [phone].    We comply with applicable federal civil rights laws and [place] laws. We do not discriminate on the basis of race, color, national origin, age, disability sex, sexual orientation or gender identity.            Thank you!     Thank you for choosing [facility]  for your care. Our goal is always to provide you with excellent care. Hearing back from our patients is one way we can continue to improve our services. Please take a few minutes to complete the written survey that you may receive in the mail after your visit with us. Thank you!             Your Updated Medication List - Protect others around you: Learn how to safely use, store and throw away your medicines at www.disposemymeds.org.          This list is accurate as of: 9/7/17 10:30 AM.  Always use your most recent med list.                   Brand Name Dispense Instructions for use Diagnosis    ALPRAZolam 0.25 MG tablet    XANAX    60 tablet    TAKE ONE-HALF TO ONE TABLET BY MOUTH EVERY 6 HOURS AS NEEDED MAX  OF  2.5  TABLETS  PER  24  HRS.    Anxiety       amitriptyline 25 MG  tablet    ELAVIL    60 tablet    Take 1 tablet (25 mg) by mouth 2 times daily    Psychophysiological insomnia, Major depressive disorder, recurrent episode, mild (H), Anxiety       ciprofloxacin 250 MG tablet    CIPRO    14 tablet    Take 1 tablet (250 mg) by mouth 2 times daily for 7 days    Acute cystitis with hematuria       cyclobenzaprine 5 MG tablet    FLEXERIL    30 tablet    Take 0.5-1 tablets (2.5-5 mg) by mouth nightly as needed for muscle spasms    Acute bilateral low back pain with right-sided sciatica       lisinopril 20 MG tablet    PRINIVIL/ZESTRIL    90 tablet    Take 1 tablet (20 mg) by mouth daily    Hypertension goal BP (blood pressure) < 140/80       melatonin 5 MG tablet    MELATONIN MAXIMUM STRENGTH    90 tablet    Take 1 tablet (5 mg) by mouth nightly as needed for sleep    Psychophysiological insomnia       methylPREDNISolone 4 MG tablet    MEDROL DOSEPAK    21 tablet    Follow package instructions    Acute bilateral low back pain with right-sided sciatica       naproxen 500 MG tablet    NAPROSYN    60 tablet    Take 1 tablet (500 mg) by mouth 2 times daily as needed for moderate pain    Acute bilateral low back pain with right-sided sciatica       senna 8.6 MG tablet    SENOKOT    90 tablet    Take 1 tablet by mouth daily    Chronic constipation

## 2017-09-07 NOTE — PROGRESS NOTES
9/7/2017 Clinic Care Coordination Contact  Care Team Conversations -Social Work      Asked by PCP- Zuly Nielsen, to see pt while in clinic.. Pt was here with her niece,Sinai Guerin [phone]. Today in clinic patient has a  UTI and new lumbar  fractures.  She has been wearing a back brace for approximately 6 weeks and has not showered.   Niece reports no evidence that food in the home is being eaten.  Strained relationship with daughter Natalie.   Niece lives close and visits often , but she will be having back surgery next week and will not be able to continue to support pt.   Discussed  options of having home care services for additional support, or going to the hospital /TCU.  At this time patient is refusing both options. States she knows her rights.  Feels she is at the end of her life , no mater what she does.  PCP reports pt.  has not been taking her pain medications.  Agrees to take an antibiotic.  Pt. will be discharged home   Niece is concerned she will not let anyone in to her home for an assessment by Adult Protection.  Concerned they might steal from her .  Vulnerable Adult report indicated. .  PCP made report along with this writer.   Report filed today 9/08/17. .Adult [employer] Suspected self neglect report -case number [id number].    Plan: Adult Protection reports made.   SW will do chart review in 2 3 weeks and assess for further needs.    Marleny Yan [facility]  , Clinic Care Coordination  Clinics:  [facility], [place]  [phone]   9/8/2017   9:07 AM

## 2017-09-09 LAB
BACTERIA SPEC CULT: NORMAL
SPECIMEN SOURCE: NORMAL

## 2017-09-13 ENCOUNTER — TELEPHONE (OUTPATIENT)
Dept: FAMILY MEDICINE | Facility: OTHER | Age: 82
End: 2017-09-13

## 2017-09-13 NOTE — TELEPHONE ENCOUNTER
Called and spoke to pt regarding the message below. It looks like she was suppose to follow up with neurosurgery from 07/21/17 in 6 weeks. Pt f/u up with Eduard on 09/07/2017. In her documentation she states that she reached out to the neurosurgery team. I don't see any documentation from the team regarding this.     Per pt she would like the following:  Pt would like everything written in a letter and mailed to her, due to her hearing.   To know her x-ray results that were done on 09/07/2017. (Team can mail this out- result has been reviewed and documented by Eduard.)    Eduard, did the team every reach out to you? If not, writer will route to neuro team.     Lawrence Vines MA  September 13, 2017

## 2017-09-14 NOTE — TELEPHONE ENCOUNTER
Rg for Gali daughter to call us back.- patient needs to follow up with Neurosurgeon Jeff Rosenthal  phone is [phone]  And then follow up with CDL

## 2017-09-14 NOTE — TELEPHONE ENCOUNTER
I spoke via Staff Message to Itzel Rosenthal PA-C, from neurosurgery. I informed her of the additional new fractures as well as no healing and worsening of previous vertebral fractures. She wanted me to let the patient know they wanted to see her back. She should just get scheduled with them.     Patient's POA is her daughter Gali. This is who we should be talking to in regards to scheduling neurosurgery follow up and follow up with me. It is ok to mail the stuff to patient as well.     Eduard Carrington PA-C  [facility]

## 2017-09-15 ENCOUNTER — CARE COORDINATION (OUTPATIENT)
Dept: CARE COORDINATION | Facility: CLINIC | Age: 82
End: 2017-09-15

## 2017-09-15 NOTE — TELEPHONE ENCOUNTER
Contacted pts daughter, states she is not really involved in care of her mother any more.  Also states her mother will not see neurosurgery.    Left message for pt to call back, daughter said she will take her to the appt if pt is willing to go.  Evelia Perry, CMA

## 2017-09-15 NOTE — PROGRESS NOTES
9/15/2017 Clinic Care Coordination Contact  Care Team Conversations    Saad from patient's daughter- Natalie Clifford.   Asking how she can get pt some help at home.   Discussed community services  [employer] and [employer], as 2 examples.    Explained mother has to agree to let them in and if for housekeeping, laundry, and meals, that elaine be private pay. Explained that we did make a strong attempt to get Todd accept home care or other services and she refused.   Daughter agrees with niece, that she will most likely not open the door and let anyone in to help her.  Daughter shared this behavior and difficult personality is not new.  She has always been a difficult person to work with.   Informed her she can make a vulnerable adult report.  Daughter stated she will keep these services in mind and continue to talk about the positive aspect of help at home.  As of today pt told her daughter she is not planning to follow up with her doctor for fear they will want to do surgery. Daughter did offer to take pt to her appts.     Plan: Daughter will continue to make efforts to assist,  As pt allows.  SW will pemberton to follow up with pt in 2 weeks.     Marleny Yan [facility]  , Clinic Care Coordination  Clinics:  [facility], [place]  [phone]   9/15/2017   3:59 PM

## 2017-09-15 NOTE — TELEPHONE ENCOUNTER
Spoke with Todd, she states she will have to think this over.  Will call pt back to try to schedule again.  Evelia Perry, CMA

## 2017-09-28 ENCOUNTER — CARE COORDINATION (OUTPATIENT)
Dept: CARE COORDINATION | Facility: CLINIC | Age: 82
End: 2017-09-28

## 2017-09-28 NOTE — PROGRESS NOTES
"9/28/2017 Clinic Care Coordination Contact  Care Team Conversations - Social Work     Follow -up call placed to patient. Pt.  appeared to be in good spirits today. Reported her niece Sinai came to help her with a shower today.  She has \"decided to no longer wear my brace and I feel like a million bucks.\" Reported she is feeling so much better since her UTI has gone away.  Todd was pleasant and willing to talk about help at home, but does not feels she is in need of additional help at this time. She continues to have a woman, ,Oralia, shop and deliver the groceries to her.   Stated, \"I even have a better appetite- now that I am feeling better. \"    She is not planning to see the Neurosurgeon again, as she feels they will want to do surgery.  Discussed need to consent for surgery, but pt. Said \"no.\"     Pt said I could call Janett Rawls, but she was on her way up [employer].    Sinai verified that Pt. asked for help with a shower today.  She was kind and cooperative with janett who is willing to help as long as she is not abusive.  Janett felt Shelby  was doing good with her mobility and balance and in good spirits.  She cleaned the brace in case pt. should need to use it again.  She will continue to check in with pt. and assist as able. Janett stated, Pt and daughter Natalie have a strained relationship.  \"Natalie cannot do anything good enough for her mother. She is willing to help .\"     Plan: Niece or pt. will call with any  future needs.  SW will plan to close. Pt can be referred with future needs.    Marleny Yan [facility]  , Clinic Care Coordination  Clinics:  [facility], [place]  [phone]   9/28/2017   4:11 PM        "

## 2017-10-12 ENCOUNTER — TELEPHONE (OUTPATIENT)
Dept: FAMILY MEDICINE | Facility: OTHER | Age: 82
End: 2017-10-12

## 2017-10-12 DIAGNOSIS — F41.9 ANXIETY: ICD-10-CM

## 2017-10-12 DIAGNOSIS — I10 HYPERTENSION GOAL BP (BLOOD PRESSURE) < 140/80: ICD-10-CM

## 2017-10-12 RX ORDER — ALPRAZOLAM 0.25 MG
TABLET ORAL
Qty: 60 TABLET | Refills: 0 | Status: SHIPPED | OUTPATIENT
Start: 2017-10-12 | End: 2017-11-28

## 2017-10-12 RX ORDER — LISINOPRIL 20 MG/1
20 TABLET ORAL DAILY
Qty: 90 TABLET | Refills: 3 | OUTPATIENT
Start: 2017-10-12

## 2017-10-12 NOTE — TELEPHONE ENCOUNTER
Refills available on Lisinopril until April 2018.    Eduard Carrington PA-C  [facility]

## 2017-10-12 NOTE — TELEPHONE ENCOUNTER
Alprazolam (Xanax) 0.25 MG      Last Written Prescription Date:  8/22/17  Last Fill Quantity: 60,   # refills: 0  Last Office Visit with [facility], [facility] or [facility] prescribing provider: 9/7/17  Future Office visit:       Routing refill request to provider for review/approval because:  Drug not on the [facility], [facility] or [facility] refill protocol or controlled substance

## 2017-10-12 NOTE — TELEPHONE ENCOUNTER
Reason for call:  Medication  Reason for Call:  Medication or medication refill:    Do you use a [facility]?  Name of the pharmacy and phone number for the current request:  [facility] - [phone]    Name of the medication requested:   ALPRAZolam (XANAX) 0.25 MG tablet    Other request: pt only has 3 left and she has no refills left. Please advise and call pt to confirm    Can we leave a detailed message on this number? YES    Phone number patient can be reached at: Home number on file [phone] (home)    Best Time: anytime    Call taken on 10/12/2017 at 8:03 AM by Justine Han

## 2017-10-12 NOTE — TELEPHONE ENCOUNTER
Spoke to patient and informed patient of message below. Rx faxed    She's wondering if she could get her lisinopril filled as well..     And she will be making her appt..

## 2017-10-12 NOTE — TELEPHONE ENCOUNTER
RX signed and placed in MA task. Patient should plan to follow up with me prior to next refill.    Eduard Kent-TELMA Nielsen  [facility]

## 2017-11-24 NOTE — PROGRESS NOTES
SUBJECTIVE:                                                    Todd Clifford is a 83 year old female who presents to clinic today with Sinai Zimmerman, niece, for the following health issues:      HPI     Back pain follow up   - Wearing back brace all the time   - Can't take off alone, needs niece to take it off   - Patient refusing treatment   - Refuses x-rays   - Feels good when wears brace   - 4 bottles of water and 2-3 ensures     - Fever today, requesting urine recheck, some dysuria     Concern - Open sore on back  Onset: 2 days ago    Description:   Right side/ribcage    Intensity: mild    Progression of Symptoms:  same    Accompanying Signs & Symptoms:  Red, itchy, possibly infected    Previous history of similar problem:   none    Precipitating factors:   Worsened by: none    Alleviating factors:  Improved by: bacitractin/band aid    Therapies Tried and outcome:     Depression and Anxiety Follow-Up    Status since last visit: No change    Other associated symptoms:None    Complicating factors:     Significant life event: Yes-  Worried about her brother     Current substance abuse: None    Wondering if she can also take melatonin with her medications for sleep  Amitriptyline - Doesn't know if it helps, but states the xanax helps her      Taking twice a day   Some nights sleeping better   Niece reports better mood and anxiety       PHQ-9 Score and MyChart F/U Questions 9/15/2016 9/29/2016 9/7/2017   Total Score 16 17 16   Q9: Suicide Ideation Not at all Not at all Not at all     KIRILL-7 SCORE 1/24/2014 10/8/2015 9/29/2016   Total Score 18 - -   Total Score - 20 19     In the past two weeks have you had thoughts of suicide or self-harm?  No.    Do you have concerns about your personal safety or the safety of others?   No    PHQ-9  English  PHQ-9   Any Language  GAD7  Suicide Assessment Five-step Evaluation and Treatment (SAFE-T)        Problem list and histories reviewed & adjusted, as indicated.  Additional  history: as documented    ROS:  Constitutional, HEENT, cardiovascular, pulmonary, gi and gu systems are negative, except as otherwise noted.      OBJECTIVE:   /72  Pulse 121  Temp 100.1  F (37.8  C) (Temporal)  Resp 28  Wt 104 lb (47.2 kg)  SpO2 96%  BMI 21.01 kg/m2  Body mass index is 21.01 kg/(m^2).  GENERAL APPEARANCE: healthy, alert and no distress  EYES: Eyes grossly normal to inspection, PERRLA, conjunctivae and sclerae without injection or discharge, EOM intact   MS: No musculoskeletal defects are noted and gait is age appropriate without ataxia   SKIN:   Large abrasion on left side of spine in mid-thoracic area, no signs of infection, signs of excoriation, no abscess   No other suspicious lesions or rashes, hydration status appears adeuqate with normal skin turgor   NEURO: Refused by patient   BACK: Refused by patient   PSYCH: Alert and oriented x3; speech- coherent , normal rate and volume; able to articulate logical thoughts, able to abstract reason, no tangential thoughts, no hallucinations or delusions, mentation appears normal, Mood is euthymic. Affect is appropriate for this mood state and bright. Thought content is free of suicidal ideation, hallucinations, and delusions. Dress is adequate and upkept. Eye contact is good during conversation.       Diagnostic Test Results:  Results for orders placed or performed in visit on 11/28/17   *UA reflex to Microscopic   Result Value Ref Range    Color Urine Yellow     Appearance Urine Clear     Glucose Urine Negative NEG^Negative mg/dL    Bilirubin Urine Negative NEG^Negative    Ketones Urine Negative NEG^Negative mg/dL    Specific Gravity Urine 1.010 1.003 - 1.035    Blood Urine Trace (A) NEG^Negative    pH Urine 7.0 5.0 - 7.0 pH    Protein Albumin Urine Negative NEG^Negative mg/dL    Urobilinogen Urine 0.2 0.2 - 1.0 EU/dL    Nitrite Urine Negative NEG^Negative    Leukocyte Esterase Urine Moderate (A) NEG^Negative    Source Unspecified Urine   "  Urine Microscopic   Result Value Ref Range    WBC Urine 25-50 (A) OTO2^O - 2 /HPF    RBC Urine 2-5 (A) OTO2^O - 2 /HPF    WBC Clumps Present (A) NEG^Negative /HPF     XR lumbar and thoracic spine - await radiology reports       ASSESSMENT/PLAN:       ICD-10-CM    1. Compression fracture of lumbar vertebra, closed, initial encounter (H) S32.000A CANCELED: XR Lumbar Spine 2/3 Views     CANCELED: XR Lumbar Spine 2/3 Views     CANCELED: XR Thoracic Spine 3 Views   2. Closed compression fracture of thoracic vertebra with delayed healing, subsequent encounter S22.000G CANCELED: XR Thoracic Spine 3 Views     CANCELED: XR Lumbar Spine 2/3 Views     CANCELED: XR Thoracic Spine 3 Views   3. Acute bilateral low back pain with right-sided sciatica M54.41    4. Abrasion T14.8XXA    5. Anxiety F41.9 ALPRAZolam (XANAX) 0.25 MG tablet     Urine Microscopic   6. Psychophysiological insomnia F51.04    7. Major depressive disorder, recurrent episode, mild (H) F33.0    8. Hypertension goal BP (blood pressure) < 140/80 I10    9. Fever, unspecified fever cause R50.9 *UA reflex to Microscopic   10. Acute cystitis with hematuria N30.01 ciprofloxacin (CIPRO) 500 MG tablet     **UA reflex to Microscopic FUTURE 14d   11. Tobacco use disorder F17.200 TOBACCO CESSATION - FOR HEALTH MAINTENANCE     1-4   - Complex patient with progressive compression fractures in thoracic and lumbar spines, now with fracture T7-T11, L1, L2, L4, & L5. (total 9) some stable and some with worsening since last x-rays 9/21/17   - Patient reports improvement in pain with TLSO brace given by neurosurgery, however significant concerns with family and myself about her hygiene since can't remove herself, would go weeks without showering (vulnerable adult report filed, per patient \"I am fine to be on my own they said\")   - Patient family frustrated with her lack of hygiene, calling for help, and not listening to doctor's advise   - Saw neurology last on 7/21/17   - Now " "has a large abrasion on her back, unclear if due to itching with long plastic spoon or from her brace   - She refuses to come back and see neurology stating \"what else can they do for me? I'm 83 and not having surgery.\"   - I made her aware of the risks of paralysis with her not healing, not following instructions, and not returning to neurosurgery. She says \"well that's the lord's will.\"   - Recommend we leave off brace for now to allow abrasion on back to heal, needs to apply antibacterial ointment once daily   - See telephone encounters      Spoke to neurosurgery, would like to see her back to help out, recommend IR evaluation for possible vestibuloplasty, and referral to orthotics for Jewison brace       I called and spoke to janett Rawls about this plan, she will speak to Todd and daughter Natalie     5-7. Mood and sleep   - Refuses further treatment for mood, is very isolated, only leaves house to come to clinic which causes severe anxiety as well   - However, takes amitriptyline for sleep, per janett helping with mood, is at top dose   - Refilled and reviewed use and side effects      8. HTN   - Always elevated when comes here due to anxiety   - Did come down with recheck  - BMP last done 9/7/17 with no concerns   - Continue Lisinopril 20 mg without change, reviewed use and side effects, refill given      9 & 10.   - Lab as above shows UTI   - Discussed antibiotic use, duration, and side effects  - Will do increased dose and prolong duration since last one was only 2 months ago   - Discussed possible etiology including holding her urine too long and not enough water     11. Tobacco - refuses to quit     The patient indicates understanding of these issues and agrees with the plan.    Follow up: as outlined above     A total of 50 minutes was spent with the patient today, with greater than 50% of the visit involving counseling and coordination of care.        Zuly Kent-TELMA Nielsen " [facility]

## 2017-11-28 ENCOUNTER — OFFICE VISIT (OUTPATIENT)
Dept: FAMILY MEDICINE | Facility: OTHER | Age: 82
End: 2017-11-28
Payer: MEDICARE

## 2017-11-28 ENCOUNTER — RADIANT APPOINTMENT (OUTPATIENT)
Dept: GENERAL RADIOLOGY | Facility: OTHER | Age: 82
End: 2017-11-28
Attending: PHYSICIAN ASSISTANT
Payer: MEDICARE

## 2017-11-28 DIAGNOSIS — S22.000G CLOSED COMPRESSION FRACTURE OF THORACIC VERTEBRA WITH DELAYED HEALING, SUBSEQUENT ENCOUNTER: ICD-10-CM

## 2017-11-28 DIAGNOSIS — F41.9 ANXIETY: ICD-10-CM

## 2017-11-28 DIAGNOSIS — N30.01 ACUTE CYSTITIS WITH HEMATURIA: ICD-10-CM

## 2017-11-28 DIAGNOSIS — R50.9 FEVER, UNSPECIFIED FEVER CAUSE: ICD-10-CM

## 2017-11-28 DIAGNOSIS — F33.0 MAJOR DEPRESSIVE DISORDER, RECURRENT EPISODE, MILD (H): ICD-10-CM

## 2017-11-28 DIAGNOSIS — S32.000A COMPRESSION FRACTURE OF LUMBAR VERTEBRA, CLOSED, INITIAL ENCOUNTER (H): ICD-10-CM

## 2017-11-28 DIAGNOSIS — S32.000A COMPRESSION FRACTURE OF LUMBAR VERTEBRA, CLOSED, INITIAL ENCOUNTER (H): Primary | ICD-10-CM

## 2017-11-28 DIAGNOSIS — M54.41 ACUTE BILATERAL LOW BACK PAIN WITH RIGHT-SIDED SCIATICA: ICD-10-CM

## 2017-11-28 DIAGNOSIS — F51.04 PSYCHOPHYSIOLOGICAL INSOMNIA: ICD-10-CM

## 2017-11-28 DIAGNOSIS — T14.8XXA ABRASION: ICD-10-CM

## 2017-11-28 DIAGNOSIS — F17.200 TOBACCO USE DISORDER: ICD-10-CM

## 2017-11-28 DIAGNOSIS — I10 HYPERTENSION GOAL BP (BLOOD PRESSURE) < 140/80: ICD-10-CM

## 2017-11-28 LAB
ALBUMIN UR-MCNC: NEGATIVE MG/DL
APPEARANCE UR: CLEAR
BILIRUB UR QL STRIP: NEGATIVE
COLOR UR AUTO: YELLOW
GLUCOSE UR STRIP-MCNC: NEGATIVE MG/DL
HGB UR QL STRIP: ABNORMAL
KETONES UR STRIP-MCNC: NEGATIVE MG/DL
LEUKOCYTE ESTERASE UR QL STRIP: ABNORMAL
NITRATE UR QL: NEGATIVE
PH UR STRIP: 7 PH (ref 5–7)
RBC #/AREA URNS AUTO: ABNORMAL /HPF
SOURCE: ABNORMAL
SP GR UR STRIP: 1.01 (ref 1–1.03)
UROBILINOGEN UR STRIP-ACNC: 0.2 EU/DL (ref 0.2–1)
WBC #/AREA URNS AUTO: ABNORMAL /HPF
WBC CLUMPS #/AREA URNS HPF: PRESENT /HPF

## 2017-11-28 PROCEDURE — 72072 X-RAY EXAM THORAC SPINE 3VWS: CPT

## 2017-11-28 PROCEDURE — 81001 URINALYSIS AUTO W/SCOPE: CPT | Performed by: PHYSICIAN ASSISTANT

## 2017-11-28 PROCEDURE — 99215 OFFICE O/P EST HI 40 MIN: CPT | Performed by: PHYSICIAN ASSISTANT

## 2017-11-28 PROCEDURE — 72100 X-RAY EXAM L-S SPINE 2/3 VWS: CPT

## 2017-11-28 RX ORDER — ALPRAZOLAM 0.25 MG
TABLET ORAL
Qty: 60 TABLET | Refills: 0 | Status: SHIPPED | OUTPATIENT
Start: 2017-11-28 | End: 2018-01-10

## 2017-11-28 RX ORDER — CIPROFLOXACIN 500 MG/1
500 TABLET, FILM COATED ORAL 2 TIMES DAILY
Qty: 14 TABLET | Refills: 0 | Status: SHIPPED | OUTPATIENT
Start: 2017-11-28 | End: 2018-05-01

## 2017-11-28 ASSESSMENT — PATIENT HEALTH QUESTIONNAIRE - PHQ9: SUM OF ALL RESPONSES TO PHQ QUESTIONS 1-9: 12

## 2017-11-28 NOTE — NURSING NOTE
"Chief Complaint   Patient presents with     Recheck Medication     Panel Management     mychart, flu, tetanus, medicare visit, PCV13, pneumo 5yr booster, fall risk, tobacco, lipid, DAP       Initial BP (!) 168/94 (BP Location: Left arm, Patient Position: Chair, Cuff Size: Adult Regular)  Pulse 121  Temp 100.1  F (37.8  C) (Temporal)  Resp 28  Wt 104 lb (47.2 kg)  SpO2 96%  BMI 21.01 kg/m2 Estimated body mass index is 21.01 kg/(m^2) as calculated from the following:    Height as of 11/29/16: 4' 11\" (1.499 m).    Weight as of this encounter: 104 lb (47.2 kg).  Medication Reconciliation: complete  "

## 2017-11-28 NOTE — MR AVS SNAPSHOT
After Visit Summary   11/28/2017    Todd Clifford    MRN: [id number]           Patient Information     Date Of Birth          8/30/1934        Visit Information        Provider Department      11/28/2017 1:30 PM Zuly Carrington, TELMA [facility]        Today's Diagnoses     Anxiety    -  1    Compression fracture of lumbar vertebra, closed, initial encounter (H)        Psychophysiological insomnia        Major depressive disorder, recurrent episode, mild (H)        Closed compression fracture of thoracic vertebra with delayed healing, subsequent encounter        Mild major depression (H)        Hypertension goal BP (blood pressure) < 140/80        Acute bilateral low back pain with right-sided sciatica        Tobacco use disorder        Fever, unspecified fever cause        Acute cystitis with hematuria        Abrasion          Care Instructions    - NO back brace for 2 weeks to allow abrasion on back to heal     Apply antibiotic ointment once daily and allow area to breathe       - Start Cipro (antibiotic) twice a day for 7 days     - Recheck urine in 2 weeks       - Recheck 3 months for anxiety           Follow-ups after your visit        Future tests that were ordered for you today     Open Future Orders        Priority Expected Expires Ordered    **UA reflex to Microscopic FUTURE 14d Routine 12/5/2017 12/12/2017 11/28/2017            Who to contact     If you have questions or need follow up information about today's clinic visit or your schedule please contact [facility] directly at [phone].  Normal or non-critical lab and imaging results will be communicated to you by MyChart, letter or phone within 4 business days after the clinic has received the results. If you do not hear from us within 7 days, please contact the clinic through MyChart or phone. If you have a critical or abnormal lab result, we will notify you by phone as soon as  "possible.  Submit refill requests through newScale or call your pharmacy and they will forward the refill request to us. Please allow 3 business days for your refill to be completed.          Additional Information About Your Visit        newScale Information     newScale lets you send messages to your doctor, view your test results, renew your prescriptions, schedule appointments and more. To sign up, go to [email or web address] . Click on \"Log in\" on the left side of the screen, which will take you to the Welcome page. Then click on \"Sign up Now\" on the right side of the page.     You will be asked to enter the access code listed below, as well as some personal information. Please follow the directions to create your username and password.     Your access code is: [email or web address]  Expires: 2018  2:43 PM     Your access code will  in 90 days. If you need help or a new code, please call your [facility] clinic or [phone].        Care EveryWhere ID     This is your Care EveryWhere ID. This could be used by other organizations to access your [facility] medical records  [id number]        Your Vitals Were     Pulse Temperature Respirations Pulse Oximetry BMI (Body Mass Index)       121 100.1  F (37.8  C) (Temporal) 28 96% 21.01 kg/m2        Blood Pressure from Last 3 Encounters:   17 (!) 168/94   17 150/84   17 146/88    Weight from Last 3 Encounters:   17 104 lb (47.2 kg)   17 104 lb (47.2 kg)   17 103 lb 12.8 oz (47.1 kg)              We Performed the Following     *UA reflex to Microscopic     TOBACCO CESSATION - FOR HEALTH MAINTENANCE     Urine Microscopic          Today's Medication Changes          These changes are accurate as of: 17  3:03 PM.  If you have any questions, ask your nurse or doctor.               Start taking these medicines.        Dose/Directions    ciprofloxacin 500 MG tablet   Commonly known as:  CIPRO   Used for:  Acute cystitis with hematuria "   Started by:  Zuly Carrington PA-C        Dose:  500 mg   Take 1 tablet (500 mg) by mouth 2 times daily   Quantity:  14 tablet   Refills:  0            Where to get your medicines      These medications were sent to [facility] [address], [place] - [address] [facility]  [address] [facility] [address]     Phone:  [phone]     ciprofloxacin 500 MG tablet         Some of these will need a paper prescription and others can be bought over the counter.  Ask your nurse if you have questions.     Bring a paper prescription for each of these medications     ALPRAZolam 0.25 MG tablet                Primary Care Provider Office Phone # Fax #    Zuly Carrington PA-C [phone]       [address] [place] [address] [address]  [facility] [address]        Equal Access to Services     BLANCHE CRUMP : Garry del rioo Sojorge, waaxda luqadaha, qaybta kaalmada adeafricayada, sam otto . So [facility] [phone].    ATENCIÓN: Si habla español, tiene a silva disposición servicios gratuitos de asistencia lingüística. Alba al [phone].    We comply with applicable federal civil rights laws and [place] laws. We do not discriminate on the basis of race, color, national origin, age, disability, sex, sexual orientation, or gender identity.            Thank you!     Thank you for choosing [facility]  for your care. Our goal is always to provide you with excellent care. Hearing back from our patients is one way we can continue to improve our services. Please take a few minutes to complete the written survey that you may receive in the mail after your visit with us. Thank you!             Your Updated Medication List - Protect others around you: Learn how to safely use, store and throw away your medicines at www.disposemymeds.org.          This list is accurate as of: 11/28/17  3:03 PM.  Always use your most recent med list.                   Brand Name  Dispense Instructions for use Diagnosis    ALPRAZolam 0.25 MG tablet    XANAX    60 tablet    TAKE ONE-HALF TO ONE TABLET BY MOUTH EVERY 6 HOURS AS NEEDED MAX  OF  2.5  TABLETS  PER  24  HRS.    Anxiety       amitriptyline 25 MG tablet    ELAVIL    60 tablet    Take 1 tablet (25 mg) by mouth 2 times daily    Psychophysiological insomnia, Major depressive disorder, recurrent episode, mild (H), Anxiety       ciprofloxacin 500 MG tablet    CIPRO    14 tablet    Take 1 tablet (500 mg) by mouth 2 times daily    Acute cystitis with hematuria       DULCOLAX PO           lisinopril 20 MG tablet    PRINIVIL/ZESTRIL    90 tablet    Take 1 tablet (20 mg) by mouth daily    Hypertension goal BP (blood pressure) < 140/80

## 2017-11-28 NOTE — LETTER
My Depression Action Plan  Name: Todd Clifford   Date of Birth 8/30/1934  Date: 11/24/2017    My doctor: Zuly Carrington   My clinic: [address]  [facility] [address]  [phone]          GREEN    ZONE   Good Control    What it looks like:     Things are going generally well. You have normal up s and down s. You may even feel depressed from time to time, but bad moods usually last less than a day.   What you need to do:  1. Continue to care for yourself (see self care plan)  2. Check your depression survival kit and update it as needed  3. Follow your physician s recommendations including any medication.  4. Do not stop taking medication unless you consult with your physician first.           YELLOW         ZONE Getting Worse    What it looks like:     Depression is starting to interfere with your life.     It may be hard to get out of bed; you may be starting to isolate yourself from others.    Symptoms of depression are starting to last most all day and this has happened for several days.     You may have suicidal thoughts but they are not constant.   What you need to do:     1. Call your care team, your response to treatment will improve if you keep your care team informed of your progress. Yellow periods are signs an adjustment may need to be made.     2. Continue your self-care, even if you have to fake it!    3. Talk to someone in your support network    4. Open up your depression survival kit           RED    ZONE Medical Alert - Get Help    What it looks like:     Depression is seriously interfering with your life.     You may experience these or other symptoms: You can t get out of bed most days, can t work or engage in other necessary activities, you have trouble taking care of basic hygiene, or basic responsibilities, thoughts of suicide or death that will not go away, self-injurious behavior.     What you need to do:  1. Call your  care team and request a same-day appointment. If they are not available (weekends or after hours) call your local crisis line, emergency room or 911.      Electronically signed by: Rona Jordan, November 24, 2017    Depression Self Care Plan / Survival Kit    Self-Care for Depression  Here s the deal. Your body and mind are really not as separate as most people think.  What you do and think affects how you feel and how you feel influences what you do and think. This means if you do things that people who feel good do, it will help you feel better.  Sometimes this is all it takes.  There is also a place for medication and therapy depending on how severe your depression is, so be sure to consult with your medical provider and/ or Behavioral Health Consultant if your symptoms are worsening or not improving.     In order to better manage my stress, I will:    Exercise  Get some form of exercise, every day. This will help reduce pain and release endorphins, the  feel good  chemicals in your brain. This is almost as good as taking antidepressants!  This is not the same as joining a gym and then never going! (they count on that by the way ) It can be as simple as just going for a walk or doing some gardening, anything that will get you moving.      Hygiene   Maintain good hygiene (Get out of bed in the morning, Make your bed, Brush your teeth, Take a shower, and Get dressed like you were going to work, even if you are unemployed).  If your clothes don't fit try to get ones that do.    Diet  I will strive to eat foods that are good for me, drink plenty of water, and avoid excessive sugar, caffeine, alcohol, and other mood-altering substances.  Some foods that are helpful in depression are: complex carbohydrates, B vitamins, flaxseed, fish or fish oil, fresh fruits and vegetables.    Psychotherapy  I agree to participate in Individual Therapy (if recommended).    Medication  If prescribed medications, I agree to take  them.  Missing doses can result in serious side effects.  I understand that drinking alcohol, or other illicit drug use, may cause potential side effects.  I will not stop my medication abruptly without first discussing it with my provider.    Staying Connected With Others  I will stay in touch with my friends, family members, and my primary care provider/team.    Use your imagination  Be creative.  We all have a creative side; it doesn t matter if it s oil painting, sand castles, or mud pies! This will also kick up the endorphins.    Witness Beauty  (AKA stop and smell the roses) Take a look outside, even in mid-winter. Notice colors, textures. Watch the squirrels and birds.     Service to others  Be of service to others.  There is always someone else in need.  By helping others we can  get out of ourselves  and remember the really important things.  This also provides opportunities for practicing all the other parts of the program.    Humor  Laugh and be silly!  Adjust your TV habits for less news and crime-drama and more comedy.    Control your stress  Try breathing deep, massage therapy, biofeedback, and meditation. Find time to relax each day.     My support system    Clinic Contact:  Phone number:    Contact 1:  Phone number:    Contact 2:  Phone number:    Denominational/:  Phone number:    Therapist:  Phone number:    Local crisis center:    Phone number:    Other community support:  Phone number:

## 2017-11-28 NOTE — PATIENT INSTRUCTIONS
- NO back brace for 2 weeks to allow abrasion on back to heal     Apply antibiotic ointment once daily and allow area to breathe       - Start Cipro (antibiotic) twice a day for 7 days     - Recheck urine in 2 weeks       - Recheck 3 months for anxiety

## 2017-11-30 ENCOUNTER — TELEPHONE (OUTPATIENT)
Dept: FAMILY MEDICINE | Facility: OTHER | Age: 82
End: 2017-11-30

## 2017-11-30 VITALS
OXYGEN SATURATION: 96 % | SYSTOLIC BLOOD PRESSURE: 132 MMHG | BODY MASS INDEX: 21.01 KG/M2 | RESPIRATION RATE: 28 BRPM | DIASTOLIC BLOOD PRESSURE: 72 MMHG | HEART RATE: 121 BPM | TEMPERATURE: 100.1 F | WEIGHT: 104 LBS

## 2017-11-30 DIAGNOSIS — S32.000A COMPRESSION FRACTURE OF LUMBAR VERTEBRA, CLOSED, INITIAL ENCOUNTER (H): Primary | ICD-10-CM

## 2017-11-30 DIAGNOSIS — S22.000G CLOSED COMPRESSION FRACTURE OF THORACIC VERTEBRA WITH DELAYED HEALING, SUBSEQUENT ENCOUNTER: ICD-10-CM

## 2017-11-30 DIAGNOSIS — M48.56XG: ICD-10-CM

## 2017-11-30 NOTE — TELEPHONE ENCOUNTER
Received message back from neurosurgery - Dr. Roldan - recommendations as follows.     We should get her assessed by IR to see if she is a candidate for vertebroplasty.  Also, we can have Orthotics fit her for a Jewitt brace, which is smaller and will have less impact on the skin.  She should only wear the brace when OOB for an extended period of time, and it can certainly be off for hygiene.       Big picture, these can be palliative events in these patients at times.  If the above fail, and she is committed to avoiding surgery (a reasonable idea), involving Hospice could ultimately be appropriate.     I'm happy to see the patient and family as well if it would be a help.      Called Sinai Guerin - patient's niece.   - Focus on getting new brace   - And convincing her to go to neurosurgery   - Gave numbers for orthotics and neurosurgery     Eduard Kent-TELMA Nielsen  [facility]

## 2017-11-30 NOTE — PROGRESS NOTES
Spoke with patient's PCP Zuly Key regarding patient unwillingness to wear brace. Based on discussion with Dr. Roldan, we will refer her to [facility] for consultation for vertebroplasty and also to orthotics for a Jewitt brace, as she should tolerate this better.

## 2017-12-06 DIAGNOSIS — N30.01 ACUTE CYSTITIS WITH HEMATURIA: ICD-10-CM

## 2017-12-06 LAB
ALBUMIN UR-MCNC: NEGATIVE MG/DL
APPEARANCE UR: CLEAR
BILIRUB UR QL STRIP: NEGATIVE
COLOR UR AUTO: YELLOW
GLUCOSE UR STRIP-MCNC: NEGATIVE MG/DL
HGB UR QL STRIP: NEGATIVE
KETONES UR STRIP-MCNC: NEGATIVE MG/DL
LEUKOCYTE ESTERASE UR QL STRIP: NEGATIVE
NITRATE UR QL: NEGATIVE
PH UR STRIP: 7 PH (ref 5–7)
SOURCE: NORMAL
SP GR UR STRIP: 1.01 (ref 1–1.03)
UROBILINOGEN UR STRIP-ACNC: 0.2 EU/DL (ref 0.2–1)

## 2017-12-06 PROCEDURE — 81003 URINALYSIS AUTO W/O SCOPE: CPT | Performed by: PHYSICIAN ASSISTANT

## 2017-12-06 NOTE — PROGRESS NOTES
Please call patient and her C Gali with the following message    Urine shows infection has completely cleared.     Eduard Carrington PA-C

## 2018-01-10 DIAGNOSIS — F41.9 ANXIETY: ICD-10-CM

## 2018-01-11 RX ORDER — ALPRAZOLAM 0.25 MG
TABLET ORAL
Qty: 60 TABLET | Refills: 0 | Status: SHIPPED | OUTPATIENT
Start: 2018-01-11 | End: 2018-02-26

## 2018-01-11 NOTE — TELEPHONE ENCOUNTER
Xanax      Last Written Prescription Date:  11/28/17  Last Fill Quantity: 60,   # refills: 0  Last Office Visit: 11/28/17  Future Office visit:       Routing refill request to provider for review/approval because:  Drug not on the [facility], [facility] or [facility] refill protocol or controlled substance    Sosa Mendes RN, BSN

## 2018-02-26 DIAGNOSIS — F41.9 ANXIETY: ICD-10-CM

## 2018-02-26 RX ORDER — ALPRAZOLAM 0.25 MG
TABLET ORAL
Qty: 60 TABLET | Refills: 0 | Status: SHIPPED | OUTPATIENT
Start: 2018-02-26 | End: 2018-04-10

## 2018-02-26 NOTE — TELEPHONE ENCOUNTER
xanax      Last Written Prescription Date:  01/11/18  Last Fill Quantity: 60,   # refills: 0  Last Office Visit: 11/28/17  Future Office visit:   FARHAN    Routing refill request to provider for review/approval because:  Drug not on the [facility], [facility] or [facility] refill protocol or controlled substance

## 2018-03-02 ENCOUNTER — TELEPHONE (OUTPATIENT)
Dept: FAMILY MEDICINE | Facility: OTHER | Age: 83
End: 2018-03-02

## 2018-03-02 NOTE — LETTER
[facility]  [address]   [place] [address]  Phone: [phone]  March 2, 2018      Todd Clifford  [address] [address]      Dear Todd,    We care about your health and have reviewed your health plan including your medical conditions, medications, and lab results.  Based on this review, it is recommended that you follow up regarding the following health topic(s):  -Depression    We recommend you take the following action(s):  -Complete and return the attached PHQ-9 Form.  If your total score is greater than 9, please schedule a followup appointment.  If you answer Yes to question 9, call your clinic between the hours of 8 to 5.  You may also call the Suicide Hotline at 7-107-923-XDUJ ([phone]) any time.     Please call us at the [facility] - [phone] (or use Nearbox) to address the above recommendations.     Thank you for trusting [facility] and we appreciate the opportunity to serve you.  We look forward to supporting your healthcare needs in the future.    Healthy Regards,    Your Health Care Team  [facility]

## 2018-03-02 NOTE — TELEPHONE ENCOUNTER
Summary:    Patient is due/failing the following:   PHQ9    Action needed:   Patient needs to do PHQ9.    Type of outreach:    Sent letter.    Questions for provider review:    None                                                                                                                                    Talita Montelongo     Chart routed to Care Team .        Panel Management Review      Patient has the following on her problem list:     Depression / Dysthymia review    Measure:  Needs PHQ-9 score of 4 or less during index window.  Administer PHQ-9 and if score is 5 or more, send encounter to provider for next steps.         PHQ-9 SCORE 9/29/2016 9/7/2017 11/28/2017   Total Score - - -   Total Score 17 16 12       If PHQ-9 recheck is 5 or more, route to provider for next steps.    Patient is due for:  PHQ9    Hypertension   Last three blood pressure readings:  BP Readings from Last 3 Encounters:   11/28/17 132/72   09/07/17 150/84   07/14/17 146/88     Blood pressure: Passed    HTN Guidelines:  Age 18-59 BP range:  Less than 140/90  Age 60-85 with Diabetes:  Less than 140/90  Age 60-85 without Diabetes:  less than 150/90      Composite cancer screening  Chart review shows that this patient is due/due soon for the following None

## 2018-03-30 NOTE — PROGRESS NOTES
SUBJECTIVE:                                                    Todd Clifford is a 82 year old female who presents to clinic today for the following health issues:      HPI    Back Pain Follow Up    Description:   Location of pain:  right  Character of pain: stabbing  Pain radiation: radiates into the right buttocks and radiates below the knee   Since last visit, pain is:  Unchanged - worsened per daughter  New numbness or weakness in legs, not attributed to pain:  no     Intensity: Currently 10/10, severe    History:   Pain interferes with job: Not applicable  Therapies tried without relief: aleve   Therapies tried with relief: sitting, laying in the right spot   Accompanying Signs & Symptoms:  Risk of Fracture:  None  Risk of Cauda Equina:  None  Risk of Infection:  None  Risk of Cancer:  None    - Would like xray on area  - Per daughter, kept complaining but wouldn't come in, now 3 months   - Muscle relaxer - didn't work   - Aleve - didn't work  - Didn't stretch, just lays around, no bending         Plan from last visit on 4/27/17  - Symptoms originating in back, discussed sciatic pain   - Discussed conservative care with stretches, NSAIDs, Flexeril, and heat      Hand out given   - Discussed naproxen use and side effects  - Discussed flexeril use and side effects, start with 1/2 tablet will cause sedation, try to avoid use of XANAX with this medication, take first and then if still can't sleep in 1 hour could try adding her 1/2 tablet Xanax   - Discussed if doesn't improve in 2-4 weeks, need to consider physical therapy   - Avoid sitting too long, move around and stretch   - Refilled rest of medications as needed   - Refuses to leave house except for doctor   - Daughter at wits end, patient calls every day saying back hurts  - Patient says everything is fine if has a hard chair       Problem list and histories reviewed & adjusted, as indicated.  Additional history: as documented    ROS:  Constitutional,  HEENT, cardiovascular, pulmonary, gi and gu systems are negative, except as otherwise noted.    OBJECTIVE:   /88 (BP Location: Left arm, Patient Position: Chair, Cuff Size: Adult Regular)  Pulse 104  Temp 98.1  F (36.7  C) (Oral)  Resp 24  Wt 103 lb 12.8 oz (47.1 kg)  SpO2 95%  BMI 20.97 kg/m2  Body mass index is 20.97 kg/(m^2).  GENERAL APPEARANCE: healthy, alert and no distress  EYES: Eyes grossly normal to inspection, PERRLA, conjunctivae and sclerae without injection or discharge, EOM intact   MS: No musculoskeletal defects are noted and gait is not observed, patient in wheelchair   SKIN: No suspicious lesions or rashes, hydration status appears adeuqate with normal skin turgor   NEURO: Strength 5+ bilateral upper and lower extremities, sensation intact in distal bilateral upper and lower extremities, mentation- intact, speech- normal, reflexes- symmetric in bilateral upper and lower extremities, cranial nerves II-XII tested and are intact   BACK: No CVA tenderness, no paralumbar tenderness, no midline tenderness, normal ROM   PSYCH: Alert and oriented x3; speech- coherent , normal rate and volume; able to articulate logical thoughts, able to abstract reason, no tangential thoughts, no hallucinations or delusions, mentation appears normal, Mood is euthymic. Affect is appropriate for this mood state and bright. Thought content is free of suicidal ideation, hallucinations, and delusions. Dress is adequate and upkept. Eye contact is good during conversation.       Diagnostic Test Results:  none     ASSESSMENT/PLAN:       ICD-10-CM    1. Acute bilateral low back pain with right-sided sciatica M54.41 HOME CARE NURSING REFERRAL     XR Lumbar Spine 2/3 Views     methylPREDNISolone (MEDROL DOSEPAK) 4 MG tablet   2. Anxiety F41.9 ALPRAZolam (XANAX) 0.25 MG tablet     - Recommend physical therapy, will get in home as patient has severe social anxiety and doesn't leave the home for anything except dr willson   -  Will also recommend safety eval to see if patient is safe at home   - Will have patient do medrol dose pack, discussed use and side effects   - X-ray today to rule out any fracture   - Likely will just need physical therapy, if doesn't needs to see specialist  - Also discussed needs to do stretching exercises as she didn't when last seen in April   - Patient really doesn't want physical therapy, but did agree to 1 visit   - Discussed recheck in 1 month   - Xanax use still appropriate, refilled today, reviewed use and side effects    The patient indicates understanding of these issues and agrees with the plan.    Follow up: 1 month           Zuly Carrington PA-C  [facility]   no

## 2018-04-10 DIAGNOSIS — F41.9 ANXIETY: ICD-10-CM

## 2018-04-10 RX ORDER — ALPRAZOLAM 0.25 MG
TABLET ORAL
Qty: 60 TABLET | Refills: 0 | Status: SHIPPED | OUTPATIENT
Start: 2018-04-10 | End: 2018-05-01

## 2018-04-10 NOTE — TELEPHONE ENCOUNTER
Pending Prescriptions:                       Disp   Refills    ALPRAZolam (XANAX) 0.25 MG tablet         60 tab*0            Sig: TAKE ONE-HALF TO ONE TABLET BY MOUTH EVERY 6           HOURS AS NEEDED. MAX OF 2.5 TABLETS PER 24 HOURS    Routing refill request to provider for review/approval because:  Drug not on the [facility] refill protocol     Immanuel Frank RN, BSN

## 2018-04-10 NOTE — TELEPHONE ENCOUNTER
Xanax 0.25 mg   Last Written Prescription Date:  2/26/18  Last Fill Quantity: 60,   # refills: 0  Last Office Visit: 11/28/17  Future Office visit:  None     OK for refill. Rx signed and placed in MA task.     Eduard Carrington PA-C  [facility]

## 2018-04-17 DIAGNOSIS — I10 HYPERTENSION GOAL BP (BLOOD PRESSURE) < 140/80: ICD-10-CM

## 2018-04-17 RX ORDER — LISINOPRIL 20 MG/1
20 TABLET ORAL DAILY
Qty: 30 TABLET | Refills: 0 | Status: SHIPPED | OUTPATIENT
Start: 2018-04-17 | End: 2018-05-01

## 2018-04-17 NOTE — TELEPHONE ENCOUNTER
lisinopril (PRINIVIL/ZESTRIL) 20 MG tablet  BP Readings from Last 3 Encounters:   11/28/17 132/72   09/07/17 150/84   07/14/17 146/88     Medication is being filled for 1 time refill only due to:  Patient needs to be seen because due for physical/4 month follow up.     Please assist with scheduling.    Nicolette Pollard, RN, BSN

## 2018-04-17 NOTE — LETTER
[facility]  [address]  [place] [address]  [phone]        April 19, 2018    Todd Clifford  [address] [address]          Dear Todd,    A one month supply of your Lisinopril has been sent to your pharmacy. Please schedule an annual exam prior to needing more refills. Please contact the clinic with any questions or concerns.     Sincerely,        Your [facility] Care Team

## 2018-04-26 NOTE — PROGRESS NOTES
SUBJECTIVE:   Todd Clifford is a 83 year old female who presents to clinic today with niece for the following health issues:      History of Present Illness     Depression & Anxiety Follow-up:     Depression/Anxiety:  Depression & Anxiety    Status since last visit::  Stable    Other associated symptoms of depression and anxiety::  None    Significant life event::  No    Current substance use::  None       Today's PHQ-9         PHQ-9 Total Score:     (P) 6   PHQ-9 Q9 Suicidal ideation:   (P) Not at all   Thoughts of suicide or self harm:      Self-harm Plan:        Self-harm Action:          Safety concerns for self or others:       KIRILL-7 Total Score: (P) 11    Hypertension:     Outpatient blood pressures:  Are not being checked    Dietary sodium intake::  Not monitoring salt intake    Diet:  Regular (no restrictions)  Frequency of exercise:  None  Taking medications regularly:  Yes  Medication side effects:  None  Additional concerns today:  YES    - Amitriptyline working wonderfully per niece   - Back is feeling better   - Pt states taking half a pill in the AM and half a pill in the PM and then 1/4 a pill in the afternoon  - Walking with walker outside of home, in home with and without   - Better terms with daughter         PHQ-9 9/7/2017 11/28/2017 5/1/2018   Total Score 16 12 6   Q9: Suicide Ideation Not at all Not at all Not at all     KIRILL-7 SCORE 10/8/2015 9/29/2016 5/1/2018   Total Score - - -   Total Score - - 11 (moderate anxiety)   Total Score 20 19 11     In the past two weeks have you had thoughts of suicide or self-harm?  No.    Do you have concerns about your personal safety or the safety of others?   No        Plan from last visit 11/28/17  1-4   - Complex patient with progressive compression fractures in thoracic and lumbar spines, now with fracture T7-T11, L1, L2, L4, & L5. (total 9) some stable and some with worsening since last x-rays 9/21/17   - Patient reports improvement in pain with TLSO  "brace given by neurosurgery, however significant concerns with family and myself about her hygiene since can't remove herself, would go weeks without showering (vulnerable adult report filed, per patient \"I am fine to be on my own they said\")   - Patient family frustrated with her lack of hygiene, calling for help, and not listening to doctor's advise   - Saw neurology last on 7/21/17   - Now has a large abrasion on her back, unclear if due to itching with long plastic spoon or from her brace   - She refuses to come back and see neurology stating \"what else can they do for me? I'm 83 and not having surgery.\"   - I made her aware of the risks of paralysis with her not healing, not following instructions, and not returning to neurosurgery. She says \"well that's the lord's will.\"   - Recommend we leave off brace for now to allow abrasion on back to heal, needs to apply antibacterial ointment once daily   - See telephone encounters      Spoke to neurosurgery, would like to see her back to help out, recommend IR evaluation for possible vestibuloplasty, and referral to orthotics for Jewison brace       I called and spoke to janett Rawls about this plan, she will speak to Todd and daughter Natalie      5-7. Mood and sleep   - Refuses further treatment for mood, is very isolated, only leaves house to come to clinic which causes severe anxiety as well   - However, takes amitriptyline for sleep, per janett helping with mood, is at top dose   - Refilled and reviewed use and side effects       8. HTN   - Always elevated when comes here due to anxiety   - Did come down with recheck  - BMP last done 9/7/17 with no concerns   - Continue Lisinopril 20 mg without change, reviewed use and side effects, refill given           Problem list and histories reviewed & adjusted, as indicated.  Additional history: as documented      BP Readings from Last 3 Encounters:   11/28/17 132/72   09/07/17 150/84   07/14/17 146/88    Wt Readings from " Last 3 Encounters:   11/28/17 104 lb (47.2 kg)   07/21/17 104 lb (47.2 kg)   07/14/17 103 lb 12.8 oz (47.1 kg)               Labs reviewed in EPIC    ROS:  Constitutional, HEENT, cardiovascular, pulmonary, gi and gu systems are negative, except as otherwise noted.    OBJECTIVE:   /78 (BP Location: Right arm, Patient Position: Chair, Cuff Size: Adult Regular)  Pulse 124  Temp 98.8  F (37.1  C) (Oral)  Resp 20  Wt 103 lb (46.7 kg)  SpO2 93%  BMI 20.8 kg/m2  Body mass index is 20.8 kg/(m^2).  GENERAL APPEARANCE: healthy, alert and no distress  EYES: Eyes grossly normal to inspection, PERRLA, conjunctivae and sclerae without injection or discharge, EOM intact   MS: No musculoskeletal defects are noted and gait is age appropriate without ataxia   SKIN: No suspicious lesions or rashes, hydration status appears adeuqate with normal skin turgor   PSYCH: Alert and oriented x3; speech- coherent , normal rate and volume; able to articulate logical thoughts, able to abstract reason, no tangential thoughts, no hallucinations or delusions, mentation appears normal, Mood is euthymic. Affect is appropriate for this mood state and bright. Thought content is free of suicidal ideation, hallucinations, and delusions. Dress is adequate and upkept. Eye contact is good during conversation.       Diagnostic Test Results:  none     ASSESSMENT/PLAN:       ICD-10-CM    1. Psychophysiological insomnia F51.04 amitriptyline (ELAVIL) 25 MG tablet   2. Anxiety F41.9 amitriptyline (ELAVIL) 25 MG tablet     ALPRAZolam (XANAX) 0.25 MG tablet   3. Mild major depression (H) F32.0 amitriptyline (ELAVIL) 25 MG tablet   4. Hypertension goal BP (blood pressure) < 140/80 I10 lisinopril (PRINIVIL/ZESTRIL) 20 MG tablet     1-3. Mood   - Niece [facility]inai reports has seen change in her mood, getting less ornery   - Taking Amitriptyline 1 tablet twice a day      OK to increase to 1 tablet in AM and 2 in PM to see if sleep improves      Reviewed use and  "side effects   - Xanax - taking total of 1.25 tablets in a day (1/2, 1/4, and 1/2)      OK to continue this       [facility] feels patient is very safe about meds, denies any SI/HI       Will give 3 month rx with 1 refill   - Recheck 6 months (patient unwilling to come sooner, has social anxiety and \"hates it here\" )     4. HTN   - Stable on Lisinopril 20 mg   - Reviewed use and side effects, refilled   - Labs done last fall, stable, will recheck at next appointment along with cholesterol     Patient refuses other HM items     The patient indicates understanding of these issues and agrees with the plan.    Follow up: 6 months with fasting labs         Zuly Kent-TELMA Nielsen  [facility]      "

## 2018-04-27 DIAGNOSIS — F51.04 PSYCHOPHYSIOLOGICAL INSOMNIA: ICD-10-CM

## 2018-04-27 DIAGNOSIS — F33.0 MAJOR DEPRESSIVE DISORDER, RECURRENT EPISODE, MILD (H): ICD-10-CM

## 2018-04-27 DIAGNOSIS — F41.9 ANXIETY: ICD-10-CM

## 2018-04-30 NOTE — TELEPHONE ENCOUNTER
Amitriptyline    Pt has OV tomorrow, medication pended in that encounter.    Shirley Lazar, RN, BSN

## 2018-05-01 ENCOUNTER — OFFICE VISIT (OUTPATIENT)
Dept: FAMILY MEDICINE | Facility: OTHER | Age: 83
End: 2018-05-01
Payer: MEDICARE

## 2018-05-01 VITALS
SYSTOLIC BLOOD PRESSURE: 118 MMHG | BODY MASS INDEX: 20.8 KG/M2 | WEIGHT: 103 LBS | OXYGEN SATURATION: 93 % | RESPIRATION RATE: 20 BRPM | TEMPERATURE: 98.8 F | DIASTOLIC BLOOD PRESSURE: 78 MMHG | HEART RATE: 124 BPM

## 2018-05-01 DIAGNOSIS — Z13.220 SCREENING FOR LIPOID DISORDERS: ICD-10-CM

## 2018-05-01 DIAGNOSIS — I10 HYPERTENSION GOAL BP (BLOOD PRESSURE) < 140/80: ICD-10-CM

## 2018-05-01 DIAGNOSIS — F32.0 MILD MAJOR DEPRESSION (H): ICD-10-CM

## 2018-05-01 DIAGNOSIS — F51.04 PSYCHOPHYSIOLOGICAL INSOMNIA: Primary | ICD-10-CM

## 2018-05-01 DIAGNOSIS — F41.9 ANXIETY: ICD-10-CM

## 2018-05-01 PROBLEM — M54.41 ACUTE BILATERAL LOW BACK PAIN WITH RIGHT-SIDED SCIATICA: Status: RESOLVED | Noted: 2017-07-17 | Resolved: 2018-05-01

## 2018-05-01 PROCEDURE — 99214 OFFICE O/P EST MOD 30 MIN: CPT | Performed by: PHYSICIAN ASSISTANT

## 2018-05-01 RX ORDER — LISINOPRIL 20 MG/1
20 TABLET ORAL DAILY
Qty: 90 TABLET | Refills: 3 | Status: SHIPPED | OUTPATIENT
Start: 2018-05-01 | End: 2019-05-14

## 2018-05-01 RX ORDER — ALPRAZOLAM 0.25 MG
.125-.25 TABLET ORAL 3 TIMES DAILY PRN
Qty: 113 TABLET | Refills: 1 | Status: SHIPPED | OUTPATIENT
Start: 2018-05-01 | End: 2018-11-02

## 2018-05-01 ASSESSMENT — ANXIETY QUESTIONNAIRES
7. FEELING AFRAID AS IF SOMETHING AWFUL MIGHT HAPPEN: NOT AT ALL
GAD7 TOTAL SCORE: 11
2. NOT BEING ABLE TO STOP OR CONTROL WORRYING: NEARLY EVERY DAY
6. BECOMING EASILY ANNOYED OR IRRITABLE: NOT AT ALL
1. FEELING NERVOUS, ANXIOUS, OR ON EDGE: NEARLY EVERY DAY
7. FEELING AFRAID AS IF SOMETHING AWFUL MIGHT HAPPEN: NOT AT ALL
3. WORRYING TOO MUCH ABOUT DIFFERENT THINGS: NEARLY EVERY DAY
GAD7 TOTAL SCORE: 11
GAD7 TOTAL SCORE: 11
5. BEING SO RESTLESS THAT IT IS HARD TO SIT STILL: NOT AT ALL
4. TROUBLE RELAXING: MORE THAN HALF THE DAYS

## 2018-05-01 ASSESSMENT — PATIENT HEALTH QUESTIONNAIRE - PHQ9
10. IF YOU CHECKED OFF ANY PROBLEMS, HOW DIFFICULT HAVE THESE PROBLEMS MADE IT FOR YOU TO DO YOUR WORK, TAKE CARE OF THINGS AT HOME, OR GET ALONG WITH OTHER PEOPLE: NOT DIFFICULT AT ALL
SUM OF ALL RESPONSES TO PHQ QUESTIONS 1-9: 6
SUM OF ALL RESPONSES TO PHQ QUESTIONS 1-9: 6

## 2018-05-01 NOTE — MR AVS SNAPSHOT
"              After Visit Summary   5/1/2018    Todd Clifford    MRN: [id number]           Patient Information     Date Of Birth          8/30/1934        Visit Information        Provider Department      5/1/2018 1:30 PM Zuly Carrington PA-C [facility]        Today's Diagnoses     Psychophysiological insomnia    -  1    Major depressive disorder, recurrent episode, mild (H)        Anxiety        Hypertension goal BP (blood pressure) < 140/80          Care Instructions    - Recheck 6 months with fasting labs           Follow-ups after your visit        Follow-up notes from your care team     Return in about 6 months (around 11/1/2018).      Who to contact     If you have questions or need follow up information about today's clinic visit or your schedule please contact [facility] directly at [phone].  Normal or non-critical lab and imaging results will be communicated to you by MyChart, letter or phone within 4 business days after the clinic has received the results. If you do not hear from us within 7 days, please contact the clinic through [employer] or phone. If you have a critical or abnormal lab result, we will notify you by phone as soon as possible.  Submit refill requests through [employer] or call your pharmacy and they will forward the refill request to us. Please allow 3 business days for your refill to be completed.          Additional Information About Your Visit        MyChart Information     [employer] lets you send messages to your doctor, view your test results, renew your prescriptions, schedule appointments and more. To sign up, go to [email or web address] . Click on \"Log in\" on the left side of the screen, which will take you to the Welcome page. Then click on \"Sign up Now\" on the right side of the page.     You will be asked to enter the access code listed below, as well as some personal information. Please follow the directions to create your " username and password.     Your access code is: [email or web address]  Expires: 2018  2:11 PM     Your access code will  in 90 days. If you need help or a new code, please call your [place] [place] clinic or [phone].        Care EveryWhere ID     This is your Care EveryWhere ID. This could be used by other organizations to access your [place] medical records  [id number]        Your Vitals Were     Pulse Temperature Respirations Pulse Oximetry BMI (Body Mass Index)       124 98.8  F (37.1  C) (Oral) 20 93% 20.8 kg/m2        Blood Pressure from Last 3 Encounters:   18 118/78   17 132/72   17 150/84    Weight from Last 3 Encounters:   18 103 lb (46.7 kg)   17 104 lb (47.2 kg)   17 104 lb (47.2 kg)              Today, you had the following     No orders found for display         Today's Medication Changes          These changes are accurate as of 18  2:11 PM.  If you have any questions, ask your nurse or doctor.               These medicines have changed or have updated prescriptions.        Dose/Directions    ALPRAZolam 0.25 MG tablet   Commonly known as:  XANAX   This may have changed:    - how much to take  - how to take this  - when to take this  - reasons to take this  - additional instructions   Used for:  Anxiety   Changed by:  Zuly Carrington PA-C        Dose:  0.125-0.25 mg   Take 0.5-1 tablets (0.125-0.25 mg) by mouth 3 times daily as needed for anxiety (Max 1.5 tablets per day) (90 day supply)   Quantity:  113 tablet   Refills:  1       amitriptyline 25 MG tablet   Commonly known as:  ELAVIL   This may have changed:  when to take this   Used for:  Psychophysiological insomnia, Major depressive disorder, recurrent episode, mild (H), Anxiety   Changed by:  Zuly Carrington PA-C        Dose:  25 mg   Take 1 tablet (25 mg) by mouth 3 times daily   Quantity:  270 tablet   Refills:  3            Where to get your medicines      These  medications were sent to [facility] [address] [place], [place] - [address] [facility]  [address] [facility] [address]     Phone:  [phone]     amitriptyline 25 MG tablet    lisinopril 20 MG tablet         Some of these will need a paper prescription and others can be bought over the counter.  Ask your nurse if you have questions.     Bring a paper prescription for each of these medications     ALPRAZolam 0.25 MG tablet                Primary Care Provider Office Phone # Fax #    Zuly Carrington PA-C [phone]       [address] [facility] [address]  [facility] [address]        Equal Access to Services     NELSON CRUMP : Hadii austin del rioo Sojorge, waaxda luqadaha, qaybta kaalmada adeafricayada, sam otto . So [facility] [phone].    ATENCIÓN: Si habla español, tiene a silva disposición servicios gratuitos de asistencia lingüística. Llame al [phone].    We comply with applicable federal civil rights laws and [place] laws. We do not discriminate on the basis of race, color, national origin, age, disability, sex, sexual orientation, or gender identity.            Thank you!     Thank you for choosing [facility]  for your care. Our goal is always to provide you with excellent care. Hearing back from our patients is one way we can continue to improve our services. Please take a few minutes to complete the written survey that you may receive in the mail after your visit with us. Thank you!             Your Updated Medication List - Protect others around you: Learn how to safely use, store and throw away your medicines at www.disposemymeds.org.          This list is accurate as of 5/1/18  2:11 PM.  Always use your most recent med list.                   Brand Name Dispense Instructions for use Diagnosis    ALPRAZolam 0.25 MG tablet    XANAX    113 tablet    Take 0.5-1 tablets (0.125-0.25 mg) by mouth 3 times daily as needed for anxiety (Max 1.5  tablets per day) (90 day supply)    Anxiety       amitriptyline 25 MG tablet    ELAVIL    270 tablet    Take 1 tablet (25 mg) by mouth 3 times daily    Psychophysiological insomnia, Major depressive disorder, recurrent episode, mild (H), Anxiety       DULCOLAX PO           lisinopril 20 MG tablet    PRINIVIL/ZESTRIL    90 tablet    Take 1 tablet (20 mg) by mouth daily    Hypertension goal BP (blood pressure) < 140/80

## 2018-05-01 NOTE — NURSING NOTE
"Chief Complaint   Patient presents with     Depression     Anxiety     Hypertension     Panel Management     MyChart, Height, Tdap, Flu, Medicare PX, FR, DAP/KIRILL/PHQ, Lipids       Initial /78 (BP Location: Right arm, Patient Position: Chair, Cuff Size: Adult Regular)  Pulse 124  Temp 98.8  F (37.1  C) (Oral)  Resp 20  Wt 103 lb (46.7 kg)  SpO2 93%  BMI 20.8 kg/m2 Estimated body mass index is 20.8 kg/(m^2) as calculated from the following:    Height as of 11/29/16: 4' 11\" (1.499 m).    Weight as of this encounter: 103 lb (46.7 kg).  Medication Reconciliation: complete  "

## 2018-05-02 ASSESSMENT — ANXIETY QUESTIONNAIRES: GAD7 TOTAL SCORE: 11

## 2018-05-02 ASSESSMENT — PATIENT HEALTH QUESTIONNAIRE - PHQ9: SUM OF ALL RESPONSES TO PHQ QUESTIONS 1-9: 6

## 2018-11-01 ENCOUNTER — DOCUMENTATION ONLY (OUTPATIENT)
Dept: OTHER | Facility: CLINIC | Age: 83
End: 2018-11-01

## 2018-11-02 ENCOUNTER — OFFICE VISIT (OUTPATIENT)
Dept: FAMILY MEDICINE | Facility: OTHER | Age: 83
End: 2018-11-02
Payer: MEDICARE

## 2018-11-02 VITALS
RESPIRATION RATE: 18 BRPM | DIASTOLIC BLOOD PRESSURE: 86 MMHG | BODY MASS INDEX: 21.09 KG/M2 | HEART RATE: 114 BPM | OXYGEN SATURATION: 96 % | WEIGHT: 104.4 LBS | SYSTOLIC BLOOD PRESSURE: 138 MMHG | TEMPERATURE: 99.4 F

## 2018-11-02 DIAGNOSIS — I10 HYPERTENSION GOAL BP (BLOOD PRESSURE) < 140/80: Primary | ICD-10-CM

## 2018-11-02 DIAGNOSIS — Z13.220 SCREENING FOR LIPOID DISORDERS: ICD-10-CM

## 2018-11-02 DIAGNOSIS — F41.9 ANXIETY: ICD-10-CM

## 2018-11-02 DIAGNOSIS — F32.0 MILD MAJOR DEPRESSION (H): ICD-10-CM

## 2018-11-02 DIAGNOSIS — F17.200 TOBACCO USE DISORDER: ICD-10-CM

## 2018-11-02 DIAGNOSIS — F51.04 PSYCHOPHYSIOLOGICAL INSOMNIA: ICD-10-CM

## 2018-11-02 LAB
ANION GAP SERPL CALCULATED.3IONS-SCNC: 8 MMOL/L (ref 3–14)
BUN SERPL-MCNC: 12 MG/DL (ref 7–30)
CALCIUM SERPL-MCNC: 9.2 MG/DL (ref 8.5–10.1)
CHLORIDE SERPL-SCNC: 100 MMOL/L (ref 94–109)
CHOLEST SERPL-MCNC: 194 MG/DL
CO2 SERPL-SCNC: 29 MMOL/L (ref 20–32)
CREAT SERPL-MCNC: 0.68 MG/DL (ref 0.52–1.04)
GFR SERPL CREATININE-BSD FRML MDRD: 82 ML/MIN/1.7M2
GLUCOSE SERPL-MCNC: 99 MG/DL (ref 70–99)
HDLC SERPL-MCNC: 68 MG/DL
LDLC SERPL CALC-MCNC: 110 MG/DL
NONHDLC SERPL-MCNC: 126 MG/DL
POTASSIUM SERPL-SCNC: 4.4 MMOL/L (ref 3.4–5.3)
SODIUM SERPL-SCNC: 137 MMOL/L (ref 133–144)
TRIGL SERPL-MCNC: 81 MG/DL

## 2018-11-02 PROCEDURE — 80048 BASIC METABOLIC PNL TOTAL CA: CPT | Performed by: PHYSICIAN ASSISTANT

## 2018-11-02 PROCEDURE — 36415 COLL VENOUS BLD VENIPUNCTURE: CPT | Performed by: PHYSICIAN ASSISTANT

## 2018-11-02 PROCEDURE — 80061 LIPID PANEL: CPT | Performed by: PHYSICIAN ASSISTANT

## 2018-11-02 PROCEDURE — 99214 OFFICE O/P EST MOD 30 MIN: CPT | Performed by: PHYSICIAN ASSISTANT

## 2018-11-02 RX ORDER — ALPRAZOLAM 0.25 MG
.125-.25 TABLET ORAL 3 TIMES DAILY PRN
Qty: 113 TABLET | Refills: 1 | Status: SHIPPED | OUTPATIENT
Start: 2018-11-02 | End: 2019-05-06

## 2018-11-02 ASSESSMENT — ANXIETY QUESTIONNAIRES
6. BECOMING EASILY ANNOYED OR IRRITABLE: NOT AT ALL
1. FEELING NERVOUS, ANXIOUS, OR ON EDGE: MORE THAN HALF THE DAYS
4. TROUBLE RELAXING: NEARLY EVERY DAY
7. FEELING AFRAID AS IF SOMETHING AWFUL MIGHT HAPPEN: MORE THAN HALF THE DAYS
GAD7 TOTAL SCORE: 11
7. FEELING AFRAID AS IF SOMETHING AWFUL MIGHT HAPPEN: MORE THAN HALF THE DAYS
GAD7 TOTAL SCORE: 11
GAD7 TOTAL SCORE: 11
2. NOT BEING ABLE TO STOP OR CONTROL WORRYING: MORE THAN HALF THE DAYS
5. BEING SO RESTLESS THAT IT IS HARD TO SIT STILL: NOT AT ALL
3. WORRYING TOO MUCH ABOUT DIFFERENT THINGS: MORE THAN HALF THE DAYS

## 2018-11-02 ASSESSMENT — PATIENT HEALTH QUESTIONNAIRE - PHQ9
SUM OF ALL RESPONSES TO PHQ QUESTIONS 1-9: 8
SUM OF ALL RESPONSES TO PHQ QUESTIONS 1-9: 8

## 2018-11-02 ASSESSMENT — PAIN SCALES - GENERAL: PAINLEVEL: SEVERE PAIN (6)

## 2018-11-02 NOTE — PROGRESS NOTES
Please call patient with the following message    Mail normal/stable labs.     Eduard Carrington PA-C

## 2018-11-02 NOTE — PROGRESS NOTES
SUBJECTIVE:   Todd Clifford is a 84 year old female who presents to clinic today with janett Rawls for the following health issues:    History of Present Illness     Depression & Anxiety Follow-up:     Depression/Anxiety:  Depression & Anxiety    Status since last visit::  Stable    Other associated symptoms of depression and anxiety::  None    Significant life event::  No    Current substance use::  None       Today's PHQ-9         PHQ-9 Total Score:     (P) 8   PHQ-9 Q9 Suicidal ideation:   (P) Not at all   Thoughts of suicide or self harm:      Self-harm Plan:        Self-harm Action:          Safety concerns for self or others:       KIRILL-7 Total Score: (P) 11    Hypertension:     Outpatient blood pressures:  Are not being checked    Dietary sodium intake::  Not monitoring salt intake  Frequency of exercise:  None  Taking medications regularly:  Yes  Medication side effects:  Not applicable  Additional concerns today:  No  Upping dose of amitiripline makes her have rushing headaches. 1 tab 7a 7p 1a        PHQ 11/28/2017 5/1/2018 11/2/2018   PHQ-9 Total Score 12 6 8   Q9: Suicide Ideation Not at all Not at all Not at all     KIRILL-7 SCORE 9/29/2016 5/1/2018 11/2/2018   Total Score - - -   Total Score - 11 (moderate anxiety) 11 (moderate anxiety)   Total Score 19 11 11     In the past two weeks have you had thoughts of suicide or self-harm?  No.    Do you have concerns about your personal safety or the safety of others?   No        Plan from last week 5/1/18  1-3. Mood   - Janett Rawls reports has seen change in her mood, getting less ornery   - Taking Amitriptyline 1 tablet twice a day      OK to increase to 1 tablet in AM and 2 in PM to see if sleep improves      Reviewed use and side effects   - Xanax - taking total of 1.25 tablets in a day (1/2, 1/4, and 1/2)      OK to continue this       [facility] feels patient is very safe about meds, denies any SI/HI       Will give 3 month rx with 1 refill   - Recheck 6  "months (patient unwilling to come sooner, has social anxiety and \"hates it here\" )      4. HTN   - Stable on Lisinopril 20 mg   - Reviewed use and side effects, refilled   - Labs done last fall, stable, will recheck at next appointment along with cholesterol           Problem list and histories reviewed & adjusted, as indicated.  Additional history: as documented    Labs reviewed in EPIC    ROS:  Constitutional, HEENT, cardiovascular, pulmonary, gi and gu systems are negative, except as otherwise noted.    OBJECTIVE:   /80  Pulse 114  Temp 99.4  F (37.4  C) (Temporal)  Resp 18  Wt 104 lb 6.4 oz (47.4 kg)  SpO2 96%  BMI 21.09 kg/m2  Body mass index is 21.09 kg/(m^2).  GENERAL APPEARANCE: healthy, alert and no distress  EYES: Eyes grossly normal to inspection, PERRLA, conjunctivae and sclerae without injection or discharge, EOM intact   RESP: Lungs clear to auscultation - no rales, rhonchi or wheezes    CV: Regular rates and rhythm, normal S1 S2, no S3 or S4, no murmur, click or rub, no peripheral edema and peripheral pulses strong and symmetric bilaterally   MS: No musculoskeletal defects are noted and gait is age appropriate without ataxia   SKIN: No suspicious lesions or rashes, hydration status appears adeuqate with normal skin turgor   PSYCH: Alert and oriented x3; speech- coherent , normal rate and volume; able to articulate logical thoughts, able to abstract reason, no tangential thoughts, no hallucinations or delusions, mentation appears normal, Mood is euthymic. Affect is appropriate for this mood state and bright. Thought content is free of suicidal ideation, hallucinations, and delusions. Dress is adequate and upkept. Eye contact is good during conversation.       Diagnostic Test Results:  none     ASSESSMENT/PLAN:       ICD-10-CM    1. Hypertension goal BP (blood pressure) < 140/80 I10 **Basic metabolic panel FUTURE 6mo   2. Mild major depression (H) F32.0    3. Anxiety F41.9 ALPRAZolam (XANAX) " 0.25 MG tablet   4. Psychophysiological insomnia F51.04    5. Tobacco use disorder F17.200 TOBACCO CESSATION ORDER FOR    6. Screening for lipoid disorders Z13.220 Lipid panel reflex to direct LDL Fasting     1. HTN   - Stable on Lisinopril   - Reviewed use and side effects, refilled   - Due for yearly BMP, will get that today, await results     2 - 4. Mood and sleep   - Tried 1 tablet AM and 2 PM of Amitriptyline, gave headaches   - Recommend we switch this to 1 tablet 3x/day     Per patient still doesn't sleep well, but improving, she would like to do the following      1 tablet 7 am, 1 tablet 7 pm, and 1 tablet 1 am, will allow this   - Reviewed use and side effects, refilled   - Also reporting that Xanax not as effective with 1/4 tablet      Will allow increase to 1/2 tablet 3x/day (1.5 tablets per day)   - Recheck 6 months     5. Declined smoking cessation     6. Due for yearly screening, will get today, await results     The patient indicates understanding of these issues and agrees with the plan.    Follow up: 6 months (she refuses sooner)       Zuly Carrington PA-C  [facility]

## 2018-11-02 NOTE — MR AVS SNAPSHOT
After Visit Summary   11/2/2018    Todd Clifford    MRN: [id number]           Patient Information     Date Of Birth          8/30/1934        Visit Information        Provider Department      11/2/2018 10:30 AM Zuly Carrington PA-C [facility]        Today's Diagnoses     Hypertension goal BP (blood pressure) < 140/80    -  1    Mild major depression (H)        Anxiety        Psychophysiological insomnia        Tobacco use disorder        Screening for lipid disorders        Screening for lipoid disorders          Care Instructions      - Amitriptyline - 1 tablet at 7 am                            1 tablet at 7 pm                             1 tablet at 1 AM     - Xanax - 1/2 at 7 am                   1/2 at 7 pm                      If you need it - you can take another 1/2 at any time           Follow-ups after your visit        Follow-up notes from your care team     Return in about 6 months (around 5/2/2019).      Who to contact     If you have questions or need follow up information about today's clinic visit or your schedule please contact [facility] directly at [phone].  Normal or non-critical lab and imaging results will be communicated to you by MyChart, letter or phone within 4 business days after the clinic has received the results. If you do not hear from us within 7 days, please contact the clinic through [employer] or phone. If you have a critical or abnormal lab result, we will notify you by phone as soon as possible.  Submit refill requests through Triggit or call your pharmacy and they will forward the refill request to us. Please allow 3 business days for your refill to be completed.          Additional Information About Your Visit        MyChart Information     Triggit lets you send messages to your doctor, view your test results, renew your prescriptions, schedule appointments and more. To sign up, go to [email or web address] .  "Click on \"Log in\" on the left side of the screen, which will take you to the Welcome page. Then click on \"Sign up Now\" on the right side of the page.     You will be asked to enter the access code listed below, as well as some personal information. Please follow the directions to create your username and password.     Your access code is: [email or web address]  Expires: 2019 10:29 AM     Your access code will  in 90 days. If you need help or a new code, please call your [facility] clinic or [phone].        Care EveryWhere ID     This is your Care EveryWhere ID. This could be used by other organizations to access your [facility] medical records  [id number]        Your Vitals Were     Pulse Temperature Respirations Pulse Oximetry BMI (Body Mass Index)       114 99.4  F (37.4  C) (Temporal) 18 96% 21.09 kg/m2        Blood Pressure from Last 3 Encounters:   18 138/86   18 118/78   17 132/72    Weight from Last 3 Encounters:   18 104 lb 6.4 oz (47.4 kg)   18 103 lb (46.7 kg)   17 104 lb (47.2 kg)              We Performed the Following     **Basic metabolic panel FUTURE 6mo     Lipid panel reflex to direct LDL Fasting     TOBACCO CESSATION ORDER FOR HM          Where to get your medicines      Some of these will need a paper prescription and others can be bought over the counter.  Ask your nurse if you have questions.     Bring a paper prescription for each of these medications     ALPRAZolam 0.25 MG tablet          Primary Care Provider Office Phone # Fax #    Zuly Carrington PA-C [phone]       [address] [address]        Equal Access to Services     BLANCHE CRUMP : Garry Issa, alejandro vergara, sam mercado. So [facility] [phone].    ATENCIÓN: Si habla español, tiene a silva disposición servicios gratuitos de asistencia lingüística. Llame al " [phone].    We comply with applicable federal civil rights laws and [place] laws. We do not discriminate on the basis of race, color, national origin, age, disability, sex, sexual orientation, or gender identity.            Thank you!     Thank you for choosing [facility] [place] [facility]  for your care. Our goal is always to provide you with excellent care. Hearing back from our patients is one way we can continue to improve our services. Please take a few minutes to complete the written survey that you may receive in the mail after your visit with us. Thank you!             Your Updated Medication List - Protect others around you: Learn how to safely use, store and throw away your medicines at www.disposemymeds.org.          This list is accurate as of 11/2/18 11:17 AM.  Always use your most recent med list.                   Brand Name Dispense Instructions for use Diagnosis    ALPRAZolam 0.25 MG tablet    XANAX    113 tablet    Take 0.5-1 tablets (0.125-0.25 mg) by mouth 3 times daily as needed for anxiety (Max 1.5 tablets per day) (90 day supply)    Anxiety       amitriptyline 25 MG tablet    ELAVIL    270 tablet    Take 1 tablet (25 mg) by mouth 3 times daily    Psychophysiological insomnia, Anxiety, Mild major depression (H)       DULCOLAX PO           lisinopril 20 MG tablet    PRINIVIL/ZESTRIL    90 tablet    Take 1 tablet (20 mg) by mouth daily    Hypertension goal BP (blood pressure) < 140/80

## 2018-11-02 NOTE — PATIENT INSTRUCTIONS
- Amitriptyline - 1 tablet at 7 am                            1 tablet at 7 pm                             1 tablet at 1 AM     - Xanax - 1/2 at 7 am                   1/2 at 7 pm                      If you need it - you can take another 1/2 at any time

## 2018-11-03 ASSESSMENT — ANXIETY QUESTIONNAIRES: GAD7 TOTAL SCORE: 11

## 2018-11-03 ASSESSMENT — PATIENT HEALTH QUESTIONNAIRE - PHQ9: SUM OF ALL RESPONSES TO PHQ QUESTIONS 1-9: 8

## 2018-11-07 ENCOUNTER — TELEPHONE (OUTPATIENT)
Dept: FAMILY MEDICINE | Facility: OTHER | Age: 83
End: 2018-11-07

## 2018-11-07 NOTE — TELEPHONE ENCOUNTER
Called patient's niece and she states Rx was picked up and directions are as prescribed. Markel Henriquez MA

## 2018-11-07 NOTE — TELEPHONE ENCOUNTER
Reason for Call:  Other call back and returning call    Detailed comments: patient's niece Sinai called clinic back. She stated she is only taking 1.5 tabs (as prescribed) she is not taking more than that in a 24 hour period. Please call back with any further questions or let her know if she can get the rx.     Phone Number Patient can be reached at: Home number on file [phone] (home)    Best Time: any    Can we leave a detailed message on this number? YES    Call taken on 11/7/2018 at 12:37 PM by Dwain Hurtado

## 2018-11-07 NOTE — TELEPHONE ENCOUNTER
"[facility] is looking for clarification on script for Xanax.  Patient is stating she takes 3 tabs daily. If correct they will need new Rx.  Current directions are:      \"Take 0.5-1 tablets (0.125-0.25 mg) by mouth 3 times daily as needed for anxiety (Max 1.5 tablets per day) (90 day supply) - Oral\"  "

## 2018-11-07 NOTE — TELEPHONE ENCOUNTER
Spoke to niece Sinai (c2c on file) she will clarify with patient if she has been taking 3 1/2 tabs daily or 3 full tabs daily and will call us back  Aysha Rose CMA

## 2018-12-11 ENCOUNTER — DOCUMENTATION ONLY (OUTPATIENT)
Dept: OTHER | Facility: CLINIC | Age: 83
End: 2018-12-11

## 2019-05-03 DIAGNOSIS — F41.9 ANXIETY: ICD-10-CM

## 2019-05-06 RX ORDER — ALPRAZOLAM 0.25 MG
.125-.25 TABLET ORAL 3 TIMES DAILY PRN
Qty: 113 TABLET | Refills: 1 | Status: SHIPPED | OUTPATIENT
Start: 2019-05-06 | End: 2019-10-21

## 2019-05-06 NOTE — TELEPHONE ENCOUNTER
Xanax   Last Written Prescription Date:  11/13/19  Last Fill Quantity: 113,   # refills: 1  Last Office Visit: 11/2/19  Future Office visit:         Due for refill. Rx signed and placed in MA task.     Eduard Carrington PA-C  [facility]

## 2019-05-14 DIAGNOSIS — I10 HYPERTENSION GOAL BP (BLOOD PRESSURE) < 140/80: ICD-10-CM

## 2019-05-14 RX ORDER — LISINOPRIL 20 MG/1
20 TABLET ORAL DAILY
Qty: 90 TABLET | Refills: 1 | Status: SHIPPED | OUTPATIENT
Start: 2019-05-14 | End: 2019-11-12

## 2019-05-14 NOTE — TELEPHONE ENCOUNTER
Prescription approved per [facility] Refill Protocol.    Rosa Maria Nation, RN, BSN

## 2019-06-04 DIAGNOSIS — F32.0 MILD MAJOR DEPRESSION (H): ICD-10-CM

## 2019-06-04 DIAGNOSIS — F51.04 PSYCHOPHYSIOLOGICAL INSOMNIA: ICD-10-CM

## 2019-06-04 DIAGNOSIS — F41.9 ANXIETY: ICD-10-CM

## 2019-06-05 NOTE — TELEPHONE ENCOUNTER
"Elavil    Medication is being filled for 1 time refill only due to:  Patient needs to be seen because needs follow up appointment for anxiety.    See 11/2/2018: Recheck 6 months (patient unwilling to come sooner, has social anxiety and \"hates it here\" )    Jerrica Carpenter RN on 6/5/2019 at 3:10 PM    "

## 2019-06-13 NOTE — PROGRESS NOTES
Subjective     Todd Clifford is a 84 year old female who presents to clinic today with daughter Natalie for the following health issues:    History of Present Illness        Mental Health Follow-up:  Patient presents to follow-up on Anxiety.    Patient's anxiety since last visit has been:  Good  The patient is not having other symptoms associated with anxiety.  Any significant life events: No  Patient is not feeling anxious or having panic attacks.  Patient has no concerns about alcohol or drug use.     Social History  Tobacco Use    Smoking status: Current Every Day Smoker      Packs/day: 1.00      Types: Cigarettes    Smokeless tobacco: Never Used  Alcohol use: No  Drug use: No      Today's PHQ-9         PHQ-9 Total Score:     (P) 9   PHQ-9 Q9 Thoughts of better off dead/self-harm past 2 weeks :   (P) Not at all   Thoughts of suicide or self harm:      Self-harm Plan:        Self-harm Action:          Safety concerns for self or others:           She eats 2-3 servings of fruits and vegetables daily.She consumes 1 sweetened beverage(s) daily.  She is taking medications regularly.     - Takes amitritypline 1 in am and 1 in PM     If takes 3rd one gets buzzing in head   - Xanax - 1.5 tablets per day         1/2 twice a day and once during night when can't sleep   - Up 4 lbs   - Tobacco - down from 23 per day to 18 per day       Reviewed and updated as needed this visit by Provider  Allergies  Meds  Problems  Med Hx  Surg Hx         Review of Systems   ROS COMP: Constitutional, HEENT, cardiovascular, pulmonary, gi and gu systems are negative, except as otherwise noted.      Objective    /80   Pulse (P) 101   Temp 98  F (36.7  C)   Resp 16   Wt 49.4 kg (108 lb 12.8 oz)   SpO2 (P) 94%   BMI 21.97 kg/m    There is no height or weight on file to calculate BMI.  Physical Exam   GENERAL APPEARANCE: healthy, alert and no distress  EYES: Eyes grossly normal to inspection, PERRLA, conjunctivae and sclerae  without injection or discharge, EOM intact   RESP: Lungs clear to auscultation - no rales, rhonchi or wheezes    CV: Regular rates and rhythm, normal S1 S2, no S3 or S4, no murmur, click or rub, no peripheral edema and peripheral pulses strong and symmetric bilaterally   MS: No musculoskeletal defects are noted and gait is age appropriate without ataxia   SKIN: No suspicious lesions or rashes, hydration status appears adeuqate with normal skin turgor   PSYCH: Alert and oriented x3; speech- coherent , normal rate and volume; able to articulate logical thoughts, able to abstract reason, no tangential thoughts, no hallucinations or delusions, mentation appears normal, Mood is euthymic. Affect is appropriate for this mood state and bright. Thought content is free of suicidal ideation, hallucinations, and delusions. Dress is adequate and upkept. Eye contact is good during conversation.       Diagnostic Test Results:  Labs reviewed in Epic        Assessment & Plan       ICD-10-CM    1. Mild major depression (H) F32.0 amitriptyline (ELAVIL) 25 MG tablet   2. Anxiety F41.9 amitriptyline (ELAVIL) 25 MG tablet   3. Psychophysiological insomnia F51.04 amitriptyline (ELAVIL) 25 MG tablet   4. Tobacco use Z72.0    5. Hypertension goal BP (blood pressure) < 140/80 I10       1 - 3. Mood and sleep   - Tried 1 tablet AM and 2 PM of Amitriptyline, gave headaches, tried taking 1 tablet three times a day and got buzzing in her head     Will leave at 1 tablet twice a day    - Reviewed use and side effects, refilled   - Xanax 1/2 tablet 3x/day (1.5 tablets per day)      Reviewed use and side effects, refilled   - Recheck every 6 months      4. Tobacco Cessation:   reports that she has been smoking cigarettes.  She has been smoking about 1.00 pack per day. She has never used smokeless tobacco.  Tobacco Cessation Action Plan: Information offered: Patient not interested at this time  Cutting back - was at 23/day now at 18/day   Has no  desire to completely quit     5. HTN   - Stable on Lisinopril 20 mg   - Reviewed use and side effects  - BMP done last fall was normal   - Recheck 6 months with BMP lab     The patient indicates understanding of these issues and agrees with the plan.    Return in about 6 months (around 12/20/2019) for Recheck.      Zuly Carrington PA-C  [facility]

## 2019-06-20 ENCOUNTER — OFFICE VISIT (OUTPATIENT)
Dept: FAMILY MEDICINE | Facility: OTHER | Age: 84
End: 2019-06-20
Payer: MEDICARE

## 2019-06-20 VITALS
RESPIRATION RATE: 16 BRPM | TEMPERATURE: 98 F | SYSTOLIC BLOOD PRESSURE: 115 MMHG | BODY MASS INDEX: 21.97 KG/M2 | WEIGHT: 108.8 LBS | DIASTOLIC BLOOD PRESSURE: 80 MMHG | HEART RATE: 94 BPM

## 2019-06-20 DIAGNOSIS — F51.04 PSYCHOPHYSIOLOGICAL INSOMNIA: ICD-10-CM

## 2019-06-20 DIAGNOSIS — F41.9 ANXIETY: ICD-10-CM

## 2019-06-20 DIAGNOSIS — F32.0 MILD MAJOR DEPRESSION (H): Primary | ICD-10-CM

## 2019-06-20 DIAGNOSIS — I10 HYPERTENSION GOAL BP (BLOOD PRESSURE) < 140/80: ICD-10-CM

## 2019-06-20 DIAGNOSIS — Z72.0 TOBACCO USE: ICD-10-CM

## 2019-06-20 DIAGNOSIS — Z13.220 SCREENING FOR LIPID DISORDERS: ICD-10-CM

## 2019-06-20 PROCEDURE — 99214 OFFICE O/P EST MOD 30 MIN: CPT | Performed by: PHYSICIAN ASSISTANT

## 2019-06-20 ASSESSMENT — ANXIETY QUESTIONNAIRES
7. FEELING AFRAID AS IF SOMETHING AWFUL MIGHT HAPPEN: MORE THAN HALF THE DAYS
5. BEING SO RESTLESS THAT IT IS HARD TO SIT STILL: NOT AT ALL
6. BECOMING EASILY ANNOYED OR IRRITABLE: SEVERAL DAYS
GAD7 TOTAL SCORE: 7
GAD7 TOTAL SCORE: 7
1. FEELING NERVOUS, ANXIOUS, OR ON EDGE: NOT AT ALL
2. NOT BEING ABLE TO STOP OR CONTROL WORRYING: NEARLY EVERY DAY
7. FEELING AFRAID AS IF SOMETHING AWFUL MIGHT HAPPEN: MORE THAN HALF THE DAYS
3. WORRYING TOO MUCH ABOUT DIFFERENT THINGS: SEVERAL DAYS
GAD7 TOTAL SCORE: 7
4. TROUBLE RELAXING: NOT AT ALL

## 2019-06-20 ASSESSMENT — PAIN SCALES - GENERAL: PAINLEVEL: NO PAIN (0)

## 2019-06-20 ASSESSMENT — PATIENT HEALTH QUESTIONNAIRE - PHQ9
SUM OF ALL RESPONSES TO PHQ QUESTIONS 1-9: 9
SUM OF ALL RESPONSES TO PHQ QUESTIONS 1-9: 9
10. IF YOU CHECKED OFF ANY PROBLEMS, HOW DIFFICULT HAVE THESE PROBLEMS MADE IT FOR YOU TO DO YOUR WORK, TAKE CARE OF THINGS AT HOME, OR GET ALONG WITH OTHER PEOPLE: NOT DIFFICULT AT ALL

## 2019-06-21 ASSESSMENT — PATIENT HEALTH QUESTIONNAIRE - PHQ9: SUM OF ALL RESPONSES TO PHQ QUESTIONS 1-9: 9

## 2019-06-21 ASSESSMENT — ANXIETY QUESTIONNAIRES: GAD7 TOTAL SCORE: 7

## 2019-06-26 ENCOUNTER — NURSE TRIAGE (OUTPATIENT)
Dept: FAMILY MEDICINE | Facility: OTHER | Age: 84
End: 2019-06-26

## 2019-06-26 NOTE — TELEPHONE ENCOUNTER
"I spoke with patient's daughter, Natalie.   Patient started having 3-4 episodes of diarrhea daily on Monday.   It is liquidy with a foul odor - no recent antibiotic use.   No blood noted.   She is not eating well, but drinking water.   She has a \"stomache ache\" that comes and goes with the diarrhea.   She is urinating at least every 6-8 hours.     RECOMMENDED DISPOSITION: See today   Will comply with recommendation: no - wondering what she can try OTC. Recommended Lomotil.    If further questions/concerns or if Sx do not improve, worsen or new Sx develop, call your PCP or [facility] as soon as possible.    Sosa Mendes, RN, BSN      Additional Information    Negative: Shock suspected (e.g., cold/pale/clammy skin, too weak to stand, low BP, rapid pulse)    Negative: Difficult to awaken or acting confused (e.g., disoriented, slurred speech)    Negative: Sounds like a life-threatening emergency to the triager    Negative: Vomiting also present and worse than the diarrhea    Negative: Blood in stool and without diarrhea    Negative: SEVERE abdominal pain (e.g., excruciating) and present > 1 hour    Negative: SEVERE abdominal pain and age > 60    Negative: Bloody, black, or tarry bowel movements    Negative: SEVERE diarrhea (e.g., 7 or more times / day more than normal) and age > 60 years    Negative: Constant abdominal pain lasting > 2 hours    Negative: Drinking very little and has signs of dehydration (e.g., no urine > 12 hours, very dry mouth, very lightheaded)    Negative: Patient sounds very sick or weak to the triager    Protocols used: DIARRHEA-A-OH      "

## 2019-06-26 NOTE — TELEPHONE ENCOUNTER
Reason for call:  Patient reporting a symptom    Symptom or request: diarrhea    Duration (how long have symptoms been present):     Have you been treated for this before? No    Additional comments: wanted to discuss symptoms    Phone Number patient can be reached at:  Please call Gali, her daughter, [phone]    Best Time:      Can we leave a detailed message on this number:  NO    Call taken on 6/26/2019 at 8:22 AM by Jenae Ochoa

## 2019-07-16 NOTE — TELEPHONE ENCOUNTER
ALPRAZolam (XANAX) 0.25 MG tablet  Routing refill request to provider for review/approval because:  Drug not on the [facility] refill protocol   Nicolette Pollard RN, BSN            Scripts faxed to pt's home [address] [place] [address] [phone]

## 2019-10-21 DIAGNOSIS — F41.9 ANXIETY: ICD-10-CM

## 2019-10-21 RX ORDER — ALPRAZOLAM 0.25 MG
.125-.25 TABLET ORAL 3 TIMES DAILY PRN
Qty: 113 TABLET | Refills: 1 | Status: SHIPPED | OUTPATIENT
Start: 2019-10-21 | End: 2020-01-01

## 2019-10-21 NOTE — TELEPHONE ENCOUNTER
Patients Daughter is calling wondering the status of the Refill. She said she needs this refilled today. Larissa is having some anxiety.

## 2019-10-21 NOTE — TELEPHONE ENCOUNTER
Patients daughter is calling again because pt is getting a little nervous that the medication is not sent over yet. Please advise.

## 2019-10-21 NOTE — TELEPHONE ENCOUNTER
Due for her routine fill. Rx e-prescribed.    Eduard Carrington PA-C  [facility]

## 2019-10-30 ENCOUNTER — TELEPHONE (OUTPATIENT)
Dept: FAMILY MEDICINE | Facility: OTHER | Age: 84
End: 2019-10-30

## 2019-10-30 NOTE — TELEPHONE ENCOUNTER
Panel Management Review    Summary:    Patient is due/failing the following:   BMP, FOLLOW UP, LDL and PHYSICAL    Action needed:   Patient needs office visit for Physical/Depression Recheck (December 2019)  Patient needs fasting lab only appointment    Type of outreach:    Phone, left message for patient to call back.     Questions for provider review:    None                                                                                                                                    Faye Brown CMA ([facility])       Chart routed to Care Team .      Patient has the following on her problem list:     Depression / Dysthymia review    Measure:  Needs PHQ-9 score of 4 or less during index window.  Administer PHQ-9 and if score is 5 or more, send encounter to provider for next steps.    5 - 7 month window range:     PHQ-9 SCORE 5/1/2018 11/2/2018 6/20/2019   PHQ-9 Total Score - - -   PHQ-9 Total Score MyChart 6 (Mild depression) 8 (Mild depression) 9 (Mild depression)   PHQ-9 Total Score 6 8 9       If PHQ-9 recheck is 5 or more, route to provider for next steps.    Patient is due for:  None    Hypertension   Last three blood pressure readings:  BP Readings from Last 3 Encounters:   06/20/19 115/80   11/02/18 138/86   05/01/18 118/78     Blood pressure: Passed    HTN Guidelines:  Less than 140/90      Composite cancer screening  Chart review shows that this patient is due/due soon for the following None

## 2019-10-30 NOTE — LETTER
[facility]  [address]   [place] [address]  Phone: [phone]  October 31, 2019      Todd Clifford  [address] [address]      Dear Todd,    We care about your health and have reviewed your health plan including your medical conditions, medications, and lab results.  Based on this review, it is recommended that you follow up regarding the following health topic(s):  -Depression  -High Blood Pressure  -Wellness (Physical) Visit     We recommend you take the following action(s):  -schedule a WELLNESS (Physical) APPOINTMENT.  We will perform the following labs: Lipids (fasting cholesterol - nothing to eat except water and/or meds for 8-10 hours). Due for Depression recheck/physical in December 2019.     Please call us at the [facility] - [phone] (or use [employer]) to address the above recommendations.     Thank you for trusting [facility] and we appreciate the opportunity to serve you.  We look forward to supporting your healthcare needs in the future.    Healthy Regards,    Your Health Care Team  [facility]

## 2019-11-06 NOTE — PROGRESS NOTES
Subjective     Todd Clifford is a 85 year old female who presents to clinic today for the following health issues:    HPI     Hypertension Follow-up      Do you check your blood pressure regularly outside of the clinic? No     Are you following a low salt diet? No    Are your blood pressures ever more than 140 on the top number (systolic) OR more   than 90 on the bottom number (diastolic), for example 140/90? No    Anxiety Follow-Up    How are you doing with your anxiety since your last visit? Worsened     Are you having other symptoms that might be associated with anxiety? No    Have you had a significant life event? OTHER: brother getting ill     Are you feeling depressed? Yes:  .    Do you have any concerns with your use of alcohol or other drugs? No    - patient states no thoughts of being better off dead. Daughter in room shaking head yes   - Seems more anxious lately per daughter         PLAN from 6/20/19   1 - 3. Mood and sleep   - Tried 1 tablet AM and 2 PM of Amitriptyline, gave headaches, tried taking 1 tablet three times a day and got buzzing in her head     Will leave at 1 tablet twice a day    - Reviewed use and side effects, refilled   - Xanax 1/2 tablet 3x/day (1.5 tablets per day)      Reviewed use and side effects, refilled   - Recheck every 6 months      4. Tobacco Cessation:   reports that she has been smoking cigarettes.  She has been smoking about 1.00 pack per day. She has never used smokeless tobacco.  Tobacco Cessation Action Plan: Information offered: Patient not interested at this time  Cutting back - was at 23/day now at 18/day   Has no desire to completely quit      5. HTN   - Stable on Lisinopril 20 mg   - Reviewed use and side effects  - BMP done last fall was normal   - Recheck 6 months with BMP lab       Social History     Tobacco Use     Smoking status: Current Every Day Smoker     Packs/day: 1.00     Types: Cigarettes     Smokeless tobacco: Never Used   Substance Use Topics      "Alcohol use: No     Drug use: No     KIRILL-7 SCORE 5/1/2018 11/2/2018 6/20/2019   Total Score - - -   Total Score 11 (moderate anxiety) 11 (moderate anxiety) 7 (mild anxiety)   Total Score 11 11 7     PHQ 5/1/2018 11/2/2018 6/20/2019   PHQ-9 Total Score 6 8 9   Q9: Thoughts of better off dead/self-harm past 2 weeks Not at all Not at all Not at all       Review of Systems   ROS COMP: Constitutional, HEENT, cardiovascular, pulmonary, gi and gu systems are negative, except as otherwise noted.      Objective    /88   Pulse 100   Temp 98.4  F (36.9  C)   Resp 16   Ht 1.499 m (4' 11\")   Wt 47.6 kg (105 lb)   BMI 21.21 kg/m    Body mass index is 21.21 kg/m .  Physical Exam   GENERAL APPEARANCE: healthy, alert and no distress  EYES: Eyes grossly normal to inspection, PERRLA, conjunctivae and sclerae without injection or discharge, EOM intact   RESP: Lungs clear to auscultation - no rales, rhonchi or wheezes    CV: Regular rates and rhythm, normal S1 S2, no S3 or S4, no murmur, click or rub, no peripheral edema and peripheral pulses strong and symmetric bilaterally   MS: No musculoskeletal defects are noted and gait is age appropriate without ataxia   SKIN: No suspicious lesions or rashes, hydration status appears adeuqate with normal skin turgor   PSYCH: Alert and oriented x3; speech- coherent , normal rate and volume; able to articulate logical thoughts, able to abstract reason, no tangential thoughts, no hallucinations or delusions, mentation appears normal, Mood is euthymic. Affect is appropriate for this mood state and bright. Thought content is free of suicidal ideation, hallucinations, and delusions. Dress is adequate and upkept. Eye contact is good during conversation.       Diagnostic Test Results:  Labs reviewed in Epic  See orders pending in Epic         Assessment & Plan       ICD-10-CM    1. Hypertension goal BP (blood pressure) < 140/80 I10    2. Mild major depression (H) F32.0    3. " Psychophysiological insomnia F51.04    4. KIRILL (generalized anxiety disorder) F41.1    5. Tobacco use Z72.0 TOBACCO CESSATION ORDER FOR HM     - Patient doesn't desire any changes      1 - 3. Mood and sleep   - Tried 1 tablet AM and 2 PM of Amitriptyline, gave headaches, tried taking 1 tablet three times a day and got buzzing in her head     Will leave at 1 tablet twice a day    - Reviewed use and side effects, refilled   - Xanax 1/2 tablet 3x/day (1.5 tablets per day)      Reviewed use and side effects, refilled   - Recheck every 6 months      4. Tobacco Cessation:   reports that she has been smoking cigarettes.  She has been smoking about 1.00 pack per day. She has never used smokeless tobacco.  Tobacco Cessation Action Plan: Information offered: Patient not interested at this time  Cutting back - was at 23/day now at 18/day   Has no desire to completely quit      5. HTN   - Stable on Lisinopril 20 mg   - Reviewed use and side effects  - BMP done last fall was normal   - Recheck 6 months with BMP lab       The patient and her daughter indicates understanding of these issues and agrees with the plan.    Return in about 6 months (around 5/12/2020).    Zuly Carrington PA-C  [facility]

## 2019-11-08 NOTE — TELEPHONE ENCOUNTER
Please review/advise - patient calling again regarding this. Will send to [facility] to review/advise.  Carina Sahu CMA    xanax      Last Written Prescription Date:  05/06/2019  Last Fill Quantity: 113,   # refills: 1  Last Office Visit: 06/20/2019  Future Office visit:       Routing refill request to provider for review/approval because:  Drug not on the [facility], [facility] or  Health refill protocol or controlled substance     S/P coronary angiogram  10 yrs ago, no intervention  S/P dilation and curettage  uterine polyps  S/P lumpectomy of breast  2011, left breast, no lymph nodes removed  S/P tonsillectomy

## 2019-11-12 ENCOUNTER — OFFICE VISIT (OUTPATIENT)
Dept: FAMILY MEDICINE | Facility: OTHER | Age: 84
End: 2019-11-12
Payer: MEDICARE

## 2019-11-12 VITALS
SYSTOLIC BLOOD PRESSURE: 136 MMHG | TEMPERATURE: 98.4 F | HEART RATE: 100 BPM | BODY MASS INDEX: 21.17 KG/M2 | DIASTOLIC BLOOD PRESSURE: 88 MMHG | RESPIRATION RATE: 16 BRPM | HEIGHT: 59 IN | WEIGHT: 105 LBS

## 2019-11-12 DIAGNOSIS — F51.04 PSYCHOPHYSIOLOGICAL INSOMNIA: ICD-10-CM

## 2019-11-12 DIAGNOSIS — F32.0 MILD MAJOR DEPRESSION (H): ICD-10-CM

## 2019-11-12 DIAGNOSIS — Z13.220 SCREENING FOR LIPOID DISORDERS: ICD-10-CM

## 2019-11-12 DIAGNOSIS — F41.1 GAD (GENERALIZED ANXIETY DISORDER): ICD-10-CM

## 2019-11-12 DIAGNOSIS — Z72.0 TOBACCO USE: ICD-10-CM

## 2019-11-12 DIAGNOSIS — I10 HYPERTENSION GOAL BP (BLOOD PRESSURE) < 140/80: Primary | ICD-10-CM

## 2019-11-12 LAB
ANION GAP SERPL CALCULATED.3IONS-SCNC: 4 MMOL/L (ref 3–14)
BUN SERPL-MCNC: 13 MG/DL (ref 7–30)
CALCIUM SERPL-MCNC: 9.2 MG/DL (ref 8.5–10.1)
CHLORIDE SERPL-SCNC: 99 MMOL/L (ref 94–109)
CHOLEST SERPL-MCNC: 195 MG/DL
CO2 SERPL-SCNC: 32 MMOL/L (ref 20–32)
CREAT SERPL-MCNC: 0.68 MG/DL (ref 0.52–1.04)
GFR SERPL CREATININE-BSD FRML MDRD: 80 ML/MIN/{1.73_M2}
GLUCOSE SERPL-MCNC: 107 MG/DL (ref 70–99)
HDLC SERPL-MCNC: 68 MG/DL
LDLC SERPL CALC-MCNC: 110 MG/DL
NONHDLC SERPL-MCNC: 127 MG/DL
POTASSIUM SERPL-SCNC: 4.1 MMOL/L (ref 3.4–5.3)
SODIUM SERPL-SCNC: 135 MMOL/L (ref 133–144)
TRIGL SERPL-MCNC: 83 MG/DL

## 2019-11-12 PROCEDURE — 36415 COLL VENOUS BLD VENIPUNCTURE: CPT | Performed by: PHYSICIAN ASSISTANT

## 2019-11-12 PROCEDURE — 99214 OFFICE O/P EST MOD 30 MIN: CPT | Performed by: PHYSICIAN ASSISTANT

## 2019-11-12 PROCEDURE — 80061 LIPID PANEL: CPT | Performed by: PHYSICIAN ASSISTANT

## 2019-11-12 PROCEDURE — 80048 BASIC METABOLIC PNL TOTAL CA: CPT | Performed by: PHYSICIAN ASSISTANT

## 2019-11-12 RX ORDER — LISINOPRIL 20 MG/1
20 TABLET ORAL DAILY
Qty: 90 TABLET | Refills: 3 | Status: SHIPPED | OUTPATIENT
Start: 2019-11-12 | End: 2021-01-01 | Stop reason: DRUGHIGH

## 2019-11-12 ASSESSMENT — ANXIETY QUESTIONNAIRES
2. NOT BEING ABLE TO STOP OR CONTROL WORRYING: NEARLY EVERY DAY
3. WORRYING TOO MUCH ABOUT DIFFERENT THINGS: NEARLY EVERY DAY
7. FEELING AFRAID AS IF SOMETHING AWFUL MIGHT HAPPEN: NEARLY EVERY DAY
6. BECOMING EASILY ANNOYED OR IRRITABLE: NEARLY EVERY DAY
1. FEELING NERVOUS, ANXIOUS, OR ON EDGE: NEARLY EVERY DAY
5. BEING SO RESTLESS THAT IT IS HARD TO SIT STILL: NOT AT ALL
IF YOU CHECKED OFF ANY PROBLEMS ON THIS QUESTIONNAIRE, HOW DIFFICULT HAVE THESE PROBLEMS MADE IT FOR YOU TO DO YOUR WORK, TAKE CARE OF THINGS AT HOME, OR GET ALONG WITH OTHER PEOPLE: EXTREMELY DIFFICULT
GAD7 TOTAL SCORE: 18

## 2019-11-12 ASSESSMENT — PATIENT HEALTH QUESTIONNAIRE - PHQ9
5. POOR APPETITE OR OVEREATING: NEARLY EVERY DAY
SUM OF ALL RESPONSES TO PHQ QUESTIONS 1-9: 15

## 2019-11-12 ASSESSMENT — MIFFLIN-ST. JEOR: SCORE: 826.91

## 2019-11-13 ASSESSMENT — ANXIETY QUESTIONNAIRES: GAD7 TOTAL SCORE: 18

## 2019-11-13 NOTE — RESULT ENCOUNTER NOTE
Paras Brooks    Your results were normal.     The results are attached for your review.       Eduard Carrington PA-C

## 2020-01-01 ENCOUNTER — TELEPHONE (OUTPATIENT)
Dept: FAMILY MEDICINE | Facility: OTHER | Age: 85
End: 2020-01-01

## 2020-01-01 ENCOUNTER — VIRTUAL VISIT (OUTPATIENT)
Dept: FAMILY MEDICINE | Facility: OTHER | Age: 85
End: 2020-01-01
Payer: MEDICARE

## 2020-01-01 ENCOUNTER — HEALTH MAINTENANCE LETTER (OUTPATIENT)
Age: 85
End: 2020-01-01

## 2020-01-01 ENCOUNTER — HOSPITAL ENCOUNTER (EMERGENCY)
Facility: CLINIC | Age: 85
Discharge: HOME | End: 2020-10-11
Attending: FAMILY MEDICINE | Admitting: FAMILY MEDICINE
Payer: MEDICARE

## 2020-01-01 ENCOUNTER — NURSE TRIAGE (OUTPATIENT)
Dept: FAMILY MEDICINE | Facility: OTHER | Age: 85
End: 2020-01-01

## 2020-01-01 VITALS
DIASTOLIC BLOOD PRESSURE: 78 MMHG | OXYGEN SATURATION: 96 % | RESPIRATION RATE: 27 BRPM | WEIGHT: 108 LBS | SYSTOLIC BLOOD PRESSURE: 144 MMHG | TEMPERATURE: 97.6 F | BODY MASS INDEX: 21.81 KG/M2 | HEART RATE: 89 BPM

## 2020-01-01 DIAGNOSIS — F51.04 PSYCHOPHYSIOLOGICAL INSOMNIA: ICD-10-CM

## 2020-01-01 DIAGNOSIS — F41.9 ANXIETY: ICD-10-CM

## 2020-01-01 DIAGNOSIS — I10 HYPERTENSION GOAL BP (BLOOD PRESSURE) < 140/80: Primary | ICD-10-CM

## 2020-01-01 DIAGNOSIS — F32.0 MILD MAJOR DEPRESSION (H): ICD-10-CM

## 2020-01-01 DIAGNOSIS — F41.1 GAD (GENERALIZED ANXIETY DISORDER): ICD-10-CM

## 2020-01-01 DIAGNOSIS — R03.0 TEMPORARY HIGH BLOOD PRESSURE: ICD-10-CM

## 2020-01-01 LAB
ALBUMIN SERPL-MCNC: 3.8 G/DL (ref 3.4–5)
ALP SERPL-CCNC: 160 U/L (ref 40–150)
ALT SERPL W P-5'-P-CCNC: 20 U/L (ref 0–50)
ANION GAP SERPL CALCULATED.3IONS-SCNC: 5 MMOL/L (ref 3–14)
AST SERPL W P-5'-P-CCNC: 16 U/L (ref 0–45)
BASOPHILS # BLD AUTO: 0.1 10E9/L (ref 0–0.2)
BASOPHILS NFR BLD AUTO: 1 %
BILIRUB SERPL-MCNC: 0.2 MG/DL (ref 0.2–1.3)
BUN SERPL-MCNC: 17 MG/DL (ref 7–30)
CALCIUM SERPL-MCNC: 9.4 MG/DL (ref 8.5–10.1)
CHLORIDE SERPL-SCNC: 101 MMOL/L (ref 94–109)
CO2 SERPL-SCNC: 29 MMOL/L (ref 20–32)
CREAT SERPL-MCNC: 0.64 MG/DL (ref 0.52–1.04)
DIFFERENTIAL METHOD BLD: NORMAL
EOSINOPHIL NFR BLD AUTO: 0.8 %
ERYTHROCYTE [DISTWIDTH] IN BLOOD BY AUTOMATED COUNT: 12.6 % (ref 10–15)
GFR SERPL CREATININE-BSD FRML MDRD: 81 ML/MIN/{1.73_M2}
GLUCOSE SERPL-MCNC: 99 MG/DL (ref 70–99)
HCT VFR BLD AUTO: 41.8 % (ref 35–47)
HGB BLD-MCNC: 13.4 G/DL (ref 11.7–15.7)
IMM GRANULOCYTES # BLD: 0 10E9/L (ref 0–0.4)
IMM GRANULOCYTES NFR BLD: 0.2 %
LYMPHOCYTES # BLD AUTO: 1.3 10E9/L (ref 0.8–5.3)
LYMPHOCYTES NFR BLD AUTO: 25 %
MCH RBC QN AUTO: 31 PG (ref 26.5–33)
MCHC RBC AUTO-ENTMCNC: 32.1 G/DL (ref 31.5–36.5)
MCV RBC AUTO: 97 FL (ref 78–100)
MONOCYTES # BLD AUTO: 0.6 10E9/L (ref 0–1.3)
MONOCYTES NFR BLD AUTO: 11 %
NEUTROPHILS # BLD AUTO: 3.2 10E9/L (ref 1.6–8.3)
NEUTROPHILS NFR BLD AUTO: 62 %
NRBC # BLD AUTO: 0 10*3/UL
NRBC BLD AUTO-RTO: 0 /100
PLATELET # BLD AUTO: 237 10E9/L (ref 150–450)
POTASSIUM SERPL-SCNC: 3.9 MMOL/L (ref 3.4–5.3)
PROT SERPL-MCNC: 7.3 G/DL (ref 6.8–8.8)
RBC # BLD AUTO: 4.32 10E12/L (ref 3.8–5.2)
SODIUM SERPL-SCNC: 135 MMOL/L (ref 133–144)
TROPONIN I SERPL-MCNC: <0.015 UG/L (ref 0–0.04)
WBC # BLD AUTO: 5.1 10E9/L (ref 4–11)

## 2020-01-01 PROCEDURE — 99284 EMERGENCY DEPT VISIT MOD MDM: CPT | Mod: 25 | Performed by: FAMILY MEDICINE

## 2020-01-01 PROCEDURE — 85025 COMPLETE CBC W/AUTO DIFF WBC: CPT | Performed by: NURSE PRACTITIONER

## 2020-01-01 PROCEDURE — 99443 PR PHYSICIAN TELEPHONE EVALUATION 21-30 MIN: CPT | Mod: 95 | Performed by: PHYSICIAN ASSISTANT

## 2020-01-01 PROCEDURE — 84484 ASSAY OF TROPONIN QUANT: CPT | Performed by: NURSE PRACTITIONER

## 2020-01-01 PROCEDURE — 93010 ELECTROCARDIOGRAM REPORT: CPT | Performed by: FAMILY MEDICINE

## 2020-01-01 PROCEDURE — 80053 COMPREHEN METABOLIC PANEL: CPT | Performed by: NURSE PRACTITIONER

## 2020-01-01 PROCEDURE — 93005 ELECTROCARDIOGRAM TRACING: CPT | Performed by: FAMILY MEDICINE

## 2020-01-01 PROCEDURE — 96360 HYDRATION IV INFUSION INIT: CPT | Performed by: FAMILY MEDICINE

## 2020-01-01 PROCEDURE — 250N000013 HC RX MED GY IP 250 OP 250 PS 637: Performed by: NURSE PRACTITIONER

## 2020-01-01 PROCEDURE — 96361 HYDRATE IV INFUSION ADD-ON: CPT | Performed by: FAMILY MEDICINE

## 2020-01-01 PROCEDURE — 258N000003 HC RX IP 258 OP 636: Performed by: NURSE PRACTITIONER

## 2020-01-01 RX ORDER — CLONIDINE HYDROCHLORIDE 0.1 MG/1
0.1 TABLET ORAL ONCE
Status: COMPLETED | OUTPATIENT
Start: 2020-01-01 | End: 2020-01-01

## 2020-01-01 RX ORDER — ALPRAZOLAM 0.25 MG
.125-.25 TABLET ORAL 3 TIMES DAILY PRN
Qty: 90 TABLET | Refills: 0 | Status: SHIPPED | OUTPATIENT
Start: 2020-01-01 | End: 2020-01-01

## 2020-01-01 RX ORDER — ALPRAZOLAM 0.25 MG
0.25 TABLET ORAL ONCE
Status: COMPLETED | OUTPATIENT
Start: 2020-01-01 | End: 2020-01-01

## 2020-01-01 RX ORDER — LISINOPRIL 30 MG/1
30 TABLET ORAL DAILY
Qty: 90 TABLET | Refills: 1 | Status: SHIPPED | OUTPATIENT
Start: 2020-01-01 | End: 2021-01-01

## 2020-01-01 RX ORDER — CLONIDINE HYDROCHLORIDE 0.1 MG/1
0.1 TABLET ORAL 2 TIMES DAILY
Qty: 30 TABLET | Refills: 1 | Status: SHIPPED | OUTPATIENT
Start: 2020-01-01 | End: 2020-01-01

## 2020-01-01 RX ORDER — SODIUM CHLORIDE 9 MG/ML
INJECTION, SOLUTION INTRAVENOUS CONTINUOUS
Status: DISCONTINUED | OUTPATIENT
Start: 2020-01-01 | End: 2020-01-01 | Stop reason: HOSPADM

## 2020-01-01 RX ORDER — ALPRAZOLAM 0.25 MG
.125-.25 TABLET ORAL 3 TIMES DAILY PRN
Qty: 113 TABLET | Refills: 1 | Status: SHIPPED | OUTPATIENT
Start: 2020-01-01 | End: 2020-01-01

## 2020-01-01 RX ORDER — ALPRAZOLAM 0.25 MG
.125-.25 TABLET ORAL 3 TIMES DAILY PRN
Qty: 113 TABLET | Refills: 0 | Status: SHIPPED | OUTPATIENT
Start: 2020-01-01 | End: 2020-01-01

## 2020-01-01 RX ADMIN — CLONIDINE HYDROCHLORIDE 0.1 MG: 0.1 TABLET ORAL at 17:03

## 2020-01-01 RX ADMIN — ALPRAZOLAM 0.25 MG: 0.25 TABLET ORAL at 17:03

## 2020-01-01 RX ADMIN — SODIUM CHLORIDE: 9 INJECTION, SOLUTION INTRAVENOUS at 17:05

## 2020-01-17 DIAGNOSIS — F32.0 MILD MAJOR DEPRESSION (H): ICD-10-CM

## 2020-01-17 DIAGNOSIS — F51.04 PSYCHOPHYSIOLOGICAL INSOMNIA: ICD-10-CM

## 2020-01-17 DIAGNOSIS — F41.9 ANXIETY: ICD-10-CM

## 2020-01-17 NOTE — TELEPHONE ENCOUNTER
Prescription approved per [facility] Refill Protocol.  Immanuel Frank, RN, BSN

## 2020-03-01 ENCOUNTER — HEALTH MAINTENANCE LETTER (OUTPATIENT)
Age: 85
End: 2020-03-01

## 2020-03-31 NOTE — TELEPHONE ENCOUNTER
Reason for call:  Medication  Reason for Call:  Medication or medication refill:    Do you use a [facility]?  Name of the pharmacy and phone number for the current request:  [facility] - [phone]    Name of the medication requested: Xanax    Other request: Please refill, she thought this was called in already    Can we leave a detailed message on this number? YES    Phone number patient can be reached at: Other phone number:  [phone] - Gali Lomas    Best Time: Any    Call taken on 3/31/2020 at 9:23 AM by Rehana Bocanegra

## 2020-03-31 NOTE — TELEPHONE ENCOUNTER
Medication(s) reviewed and approved. Rx. was faxed to the designated pharmacy.    Please notify that this has been done.      Please close the encounter when finished with it.     Zuly Carrington PA-C

## 2020-04-20 NOTE — TELEPHONE ENCOUNTER
Prescription approved per [facility] Refill Protocol.    Sosa Mendes, RN, BSN

## 2020-09-02 NOTE — TELEPHONE ENCOUNTER
Reason for Call:  Medication or medication refill:    Do you use a [facility]?  Name of the pharmacy and phone number for the current request:  [facility] - [phone]    Name of the medication requested: Amitriptyline 25mg    Other request: Patient needs the refill this morning. Her caretaker is coming this way this morning and would need to pick it up then.     Can we leave a detailed message on this number? YES    Phone number patient can be reached at: Home number on file [phone] (home)    Best Time: ANY    Call taken on 9/2/2020 at 10:15 AM by Dwain Hurtado

## 2020-09-21 NOTE — TELEPHONE ENCOUNTER
Refill sent as is due. Yearly recheck due in Nov which would be around when due for next fill.    Eduard Carrington PA-C  [facility]

## 2020-09-21 NOTE — TELEPHONE ENCOUNTER
Reason for Call:  Medication or medication refill:    Do you use a [facility]?  Name of the pharmacy and phone number for the current request:  [facility]    Name of the medication requested: xanax    Other request: patient knows it is early but her daughter who picks up her medication she can only pick it up now    Can we leave a detailed message on this number? NO    Phone number patient can be reached at:   [phone]  Best Time:     Call taken on 9/21/2020 at 3:08 PM by Jenae Ochoa

## 2020-10-06 NOTE — PATIENT INSTRUCTIONS
- Monitor for 24 hours - >180/90 = go to ED      Send me readings in 1 day     - Thursday AM increase the Lisinopril to 30 mg (1.5 tablets)     - Monday - call/send more BP readings to Eduard     - Increase Xanax to 1 full tablet in the morning, 1/2 tablet in the afternoon, and 1 whole one at night     - Recheck with Eduard in 2 weeks if things don't improve

## 2020-10-06 NOTE — PROGRESS NOTES
"Todd Clifford is a 86 year old female who is being evaluated via a billable telephone visit.      The patient has been notified of following:     \"This telephone visit will be conducted via a call between you and your physician/provider. We have found that certain health care needs can be provided without the need for a physical exam.  This service lets us provide the care you need with a short phone conversation.  If a prescription is necessary we can send it directly to your pharmacy.  If lab work is needed we can place an order for that and you can then stop by our lab to have the test done at a later time.    Telephone visits are billed at different rates depending on your insurance coverage. During this emergency period, for some insurers they may be billed the same as an in-person visit.  Please reach out to your insurance provider with any questions.    If during the course of the call the physician/provider feels a telephone visit is not appropriate, you will not be charged for this service.\"    Patient has given verbal consent for Telephone visit?  Yes    What phone number would you like to be contacted at? [phone]    How would you like to obtain your AVS? Liyat    Subjective     Todd Clifford is a 86 year old female who presents via phone visit today for the following health issues:    HPI     High BP and seeing red spots     Pt spoke with Triage RN before visit, RN recommended ED or OV but pt refused as she cannot drive. Pt would like to see what CDL thinks      Spoke to [facility] - 15 min   - Sunday called because saw red spots   - /86, pulse 102   - Was there from 12-2   - Then daughter came over and couldn't get cuff to work 199/91, so they call ambulance      160/84 when they got there, they wanted to take her in but she refused   - Hearing rushing and ringing in her ears  - Spent two days with her on and off      198/86, pulse 102, rechecked at 1230 pm Sunday 180/86 pulses >100     " Today - 171/98 pulse over 102 even after xanax   - Was very short, not happy at all, screaming matches with daughter all the time    - Back pain all the time, sleeps a lot   - Can't hear  - House is disgusting   - Hasn't bathed in 2 months      Washed her hair a few months ago      Spoke with daughter Natalie & could hear [place]en[place]ean in the background - 18 min   - 195/90, 165/83 pulse 91   - A lot of anxiety lately over dumb stuff   - Red spots only happened one time on Sunday   - Ambulance cat said she looked good   - She just gets all excited too much   - Rushing in ears, takes xanax and goes away   - Back pain been bad, takes tylenol, helps some   - Forgot her medication on 9/29 (1 week ago)          Patient was transferred to writer from the rooming staff. Patient and writer discussed the following. Will route to [place] for review.      Patient stated that she has a lot going on. Today her daughter is in the hospital getting a CT scan done, her brother is not doing well, and her son in law recently had a hip surgery. The patient struggles with anxiety and often when she is so stressed her BP goes up.   Yesterday her BP was reading high that her daughter call the ambulance. Her BP readings were 195/90 with a pulse of 97 and then 171/90 with a 97 pulse. Both readings taken within 20 min apart.   Additional Information    Negative: Sounds like a life-threatening emergency to the triager    Negative: Pregnant > 20 weeks or postpartum (< 6 weeks after delivery) and new hand or face swelling    Negative: Pregnant > 20 weeks and BP > 140/90    Negative: Systolic BP >= 160 OR Diastolic >= 100, and any cardiac or neurologic symptoms (e.g., chest pain, difficulty breathing, unsteady gait, blurred vision)    Negative: Patient sounds very sick or weak to the triager    Negative: BP Systolic BP >= 140 OR Diastolic >= 90 and postpartum (from 0 to 6 weeks after delivery)    Systolic BP >= 180 OR Diastolic >= 110, and missed most  recent dose of blood pressure medication    Protocols used: HIGH BLOOD PRESSURE-A-OH            Review of Systems   Constitutional, HEENT, cardiovascular, pulmonary, gi and gu systems are negative, except as otherwise noted.       Objective      Vitals:  No vitals were obtained today due to virtual visit.    healthy, alert and no distress  PSYCH: Alert and oriented times 3; coherent speech, normal   rate and volume, able to articulate logical thoughts, able   to abstract reason, no tangential thoughts, no hallucinations   or delusions  Her affect is normal  RESP: No cough, no audible wheezing, able to talk in full sentences  Remainder of exam unable to be completed due to telephone visits    Diagnostics  None         Assessment & Plan     ICD-10-CM    1. Hypertension goal BP (blood pressure) < 140/80  I10 lisinopril (ZESTRIL) 30 MG tablet   2. KIRILL (generalized anxiety disorder)  F41.1    3. Anxiety  F41.9 ALPRAZolam (XANAX) 0.25 MG tablet     - Patient, daughter, and niece reporting elevated BPs, was lower when they called EMS on Sunday was 160/84     EMS said she looked good and recommended ED evaluation but patient declined      Refuses to leave her house      That day saw red spots, but has since not had any   - Reports increased anxiety   - Discussed risks of her BP being this elevated for so long including heart attack and stroke   - There is some concern that her BP cuff isn't accurate     Today on niece's machine 195/90, then after Xanax 165/83 pulse 91   - Discussed still high      Recommend monitor but if gets too high will insist on them going to ED      Will also plan to increase Lisinopril and continue monitoring   - Will also increased her Xanax to help with increasing anxiety, worry, and agitation     As previous, we tried lots of things in the past but only likes the low dose Amitriptyline and Xanax      Reviewed use and side effects      PLAN   - Monitor for 24 hours - >180/90 = go to ED      Send me  readings in 1 day   - Thursday AM increase the Lisinopril to 30 mg (1.5 tablets)   - Monday - call/send more BP readings to Eduard   - Increase Xanax to 1 full tablet in the morning, 1/2 tablet in the afternoon, and 1 whole one at night   - Recheck with Eduard in 2 weeks if things don't improve     The patient and her daughter indicates understanding of these issues and agrees with the plan.    Return in about 2 weeks (around 10/20/2020) for if not improving.    Zuly Carrington PA-C  [facility]    Phone call duration:  15 minutes with [facility] and 17 minutes with patient and daughter - 32 min

## 2020-10-06 NOTE — TELEPHONE ENCOUNTER
Gali Clifford daughter c2c is on file and [place]a can't hear so phone visit probably isn't a good thing.  and she is so worked up and her bp is going up  Gali is at [facility] now if you could call her to fill her in on what is going on [phone]

## 2020-10-06 NOTE — TELEPHONE ENCOUNTER
"Patient was transferred to writer from the rooming staff. Patient and writer discussed the following. Will route to [facility] for review.     Patient stated that she has a lot going on. Today her daughter is in the hospital getting a CT scan done, her brother is not doing well, and her son in law recently had a hip surgery. The patient struggles with anxiety and often when she is so stressed her BP goes up.   Yesterday her BP was reading high that her daughter call the ambulance. Her BP readings were 195/90 with a pulse of 97 and then 171/90 with a 97 pulse. Both readings taken within 20 min apart. When the EMT team arrived her BP was 181/95 and when they left it was 160/54. Patient stated that she refused to be taken to the hospital for treatment due to the COVID. The EMT team informed the patient to follow up with her PCP. The patient informed the EMT team that she was seeing red spots and she was told that can happen when her BP is too high. The red spots are intermittent and at the time of this documentation the patient was not seeing red spots. Often times she has anxiety attacks and when she takes her Xanax her BP goes down. Patient denies chest pain, fevers, numbness, vision changes, nausea, vomiting, headaches, and confusion. Patient is currently taking 1 tablet of Lisinopril 20mg at 6pm daily. She missed one dose last Tuesday 09/29, but the next day got herself on track.     PLAN:   Informed patient that she should be evaluated in the ER or in person in the clinic. The patient denied to be evaluated in person anywhere. Writer explained that sometimes there are other reasons why our BP becomes elevated and it is best to have an evaluation to figure that out verses a phone conversation. Patient kept repeating \" I feel fine.\"   Phone visit was kept due to patient not wanting to be seen in person. Informed the patient that Eduard may say the same thing as the writer and that she needs to be evaluated in person. "     Lawrence Vines RN  October 6, 2020    Additional Information    Negative: Sounds like a life-threatening emergency to the triager    Negative: Pregnant > 20 weeks or postpartum (< 6 weeks after delivery) and new hand or face swelling    Negative: Pregnant > 20 weeks and BP > 140/90    Negative: Systolic BP >= 160 OR Diastolic >= 100, and any cardiac or neurologic symptoms (e.g., chest pain, difficulty breathing, unsteady gait, blurred vision)    Negative: Patient sounds very sick or weak to the triager    Negative: BP Systolic BP >= 140 OR Diastolic >= 90 and postpartum (from 0 to 6 weeks after delivery)    Systolic BP >= 180 OR Diastolic >= 110, and missed most recent dose of blood pressure medication    Protocols used: HIGH BLOOD PRESSURE-A-OH

## 2020-10-11 NOTE — ED PROVIDER NOTES
"  History     Chief Complaint   Patient presents with     Hypertension     The history is provided by the patient.     Todd Clifford is a 86 year old female who presents to the emergency department via EMS with concerns of hypertension. The patient lives alone, but has EMS visit 4 times a week for blood pressure checks. When checked by them it runs at normal levels, but when checked by her or her daughter the numbers are running high. The machine they were using was older, so they bought a new machine to check her blood pressure. Even with a newer machine the readings are still high. She has been eating and drinking normally. No chest pain. History of anxiety. She smokes about 6 cigarettes a day, but says when she gets nervous she could go through 23. She rolls her own cigarettes. The patient has been taking her medications. She notes that she is supposed to take her Xanax around 1600, her Hypertension medications around 1800 and her antidepressants. Her PCP is Dr. Eduard Kent.     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    Family History:    Family History   Problem Relation Age of Onset     C.A.D. Mother      " Hypertension Mother      C.A.D. Father      Hypertension Father      Hypertension Brother      Diabetes Daughter        Social History:  Marital Status:   [2]  Social History     Tobacco Use     Smoking status: Current Every Day Smoker     Packs/day: 1.00     Types: Cigarettes     Smokeless tobacco: Never Used   Substance Use Topics     Alcohol use: No     Drug use: No        Medications:         ALPRAZolam (XANAX) 0.25 MG tablet       amitriptyline (ELAVIL) 25 MG tablet       lisinopril (PRINIVIL/ZESTRIL) 20 MG tablet       lisinopril (ZESTRIL) 30 MG tablet          Review of Systems   All other systems reviewed and are negative.      Physical Exam   Weight: 49 kg (108 lb)      Physical Exam  Vitals signs and nursing note reviewed.   Constitutional:       General: She is not in acute distress.     Appearance: Normal appearance. She is not diaphoretic.   HENT:      Head: Normocephalic and atraumatic.      Mouth/Throat:      Mouth: Mucous membranes are moist.   Eyes:      Extraocular Movements: Extraocular movements intact.      Conjunctiva/sclera: Conjunctivae normal.      Pupils: Pupils are equal, round, and reactive to light.   Neck:      Musculoskeletal: Normal range of motion and neck supple. No neck rigidity.   Cardiovascular:      Rate and Rhythm: Normal rate.      Heart sounds: Murmur present. Systolic murmur present with a grade of 3/6.   Pulmonary:      Effort: Pulmonary effort is normal. No respiratory distress.      Breath sounds: Wheezing (expiratory) present.   Musculoskeletal: Normal range of motion.         General: No deformity or signs of injury.   Skin:     General: Skin is dry.      Findings: No rash.   Neurological:      Mental Status: She is alert and oriented to person, place, and time.   Psychiatric:         Behavior: Behavior normal.         ED Course        Procedures               EKG Interpretation:      Interpreted by Juana Bell  Time reviewed: 1650  Symptoms at time of  EKG: Hypertension    Rhythm: normal sinus   Rate: normal  Axis: normal  Ectopy: none  Conduction: normal   ST Segments/ T Waves: No ST-T wave changes  Q Waves: none  Comparison to prior: Unchanged  Clinical Impression: normal EKG      Results for orders placed or performed during the hospital encounter of 10/11/20 (from the past 24 hour(s))   CBC with platelets differential   Result Value Ref Range    WBC 5.1 4.0 - 11.0 10e9/L    RBC Count 4.32 3.8 - 5.2 10e12/L    Hemoglobin 13.4 11.7 - 15.7 g/dL    Hematocrit 41.8 35.0 - 47.0 %    MCV 97 78 - 100 fl    MCH 31.0 26.5 - 33.0 pg    MCHC 32.1 31.5 - 36.5 g/dL    RDW 12.6 10.0 - 15.0 %    Platelet Count 237 150 - 450 10e9/L    Diff Method Automated Method     % Neutrophils 62.0 %    % Lymphocytes 25.0 %    % Monocytes 11.0 %    % Eosinophils 0.8 %    % Basophils 1.0 %    % Immature Granulocytes 0.2 %    Nucleated RBCs 0 0 /100    Absolute Neutrophil 3.2 1.6 - 8.3 10e9/L    Absolute Lymphocytes 1.3 0.8 - 5.3 10e9/L    Absolute Monocytes 0.6 0.0 - 1.3 10e9/L    Absolute Basophils 0.1 0.0 - 0.2 10e9/L    Abs Immature Granulocytes 0.0 0 - 0.4 10e9/L    Absolute Nucleated RBC 0.0    Comprehensive metabolic panel   Result Value Ref Range    Sodium 135 133 - 144 mmol/L    Potassium 3.9 3.4 - 5.3 mmol/L    Chloride 101 94 - 109 mmol/L    Carbon Dioxide 29 20 - 32 mmol/L    Anion Gap 5 3 - 14 mmol/L    Glucose 99 70 - 99 mg/dL    Urea Nitrogen 17 7 - 30 mg/dL    Creatinine 0.64 0.52 - 1.04 mg/dL    GFR Estimate 81 >60 mL/min/[1.73_m2]    GFR Estimate If Black >90 >60 mL/min/[1.73_m2]    Calcium 9.4 8.5 - 10.1 mg/dL    Bilirubin Total 0.2 0.2 - 1.3 mg/dL    Albumin 3.8 3.4 - 5.0 g/dL    Protein Total 7.3 6.8 - 8.8 g/dL    Alkaline Phosphatase 160 (H) 40 - 150 U/L    ALT 20 0 - 50 U/L    AST 16 0 - 45 U/L   Troponin I   Result Value Ref Range    Troponin I ES <0.015 0.000 - 0.045 ug/L       Medications   sodium chloride 0.9% infusion ( Intravenous New Bag 10/11/20 0706)    ALPRAZolam (XANAX) tablet 0.25 mg (0.25 mg Oral Given 10/11/20 1703)   cloNIDine (CATAPRES) tablet 0.1 mg (0.1 mg Oral Given 10/11/20 1703)       Assessments & Plan (with Medical Decision Making)  86 year old female who presents via EMS with concerns of high blood pressure recently. History of hypertension and anxiety. Upon arrival she is hypertensive at 179/103. She is otherwise afebrile and all other vitals are within normal limits. Exam revealed a 3/6 systolic murmur and expiratory wheezes, but was otherwise insignificant.   The patient was given IV fluids, her Xanax and Clonidine while here in the ED. Blood work was collected and reviewed. CBC, CMP and Troponin showed no abnormalities and were all within normal limits.  An EKG was obtained to rule out a cardiac cause of her hypertension. This showed normal sinus rhythm. See EKG report above for full details.   Blood pressure trended down nicely here in the emergency room, patient remained asymptomatic.  I had a long discussion with patient and her daughter that oftentimes we do not treat high blood pressure if it is asymptomatic, patient has been checking her blood pressure several times a day which I highly encouraged her to stop doing and only check once a day at the same time every day and can follow-up with a week of these recordings with her primary care provider, medication adjustments can be made at that time as warranted.  Patient and daughter were agreeable to plan of care, certainly if patient develops new concerns or worsening symptoms she should return here to the emergency room, patient was discharged in stable condition.     I have reviewed the nursing notes.    I have reviewed the findings, diagnosis, plan and need for follow up with the patient.    New Prescriptions    No medications on file       Final diagnoses:   Temporary high blood pressure       This document serves as a record of services personally performed by Juana Bell,  CNP*. It was created on their behalf by Bharati Lee, a trained medical scribe. The creation of this record is based on the provider's personal observations and the statements of the patient. This document has been checked and approved by the attending provider.  Note: Chart documentation done in part with Dragon Voice Recognition software. Although reviewed after completion, some word and grammatical errors may remain.  10/11/2020   [facility] EMERGENCY DEPT     Juana Bell APRN CNP  10/11/20 1827

## 2020-10-11 NOTE — ED AVS SNAPSHOT
[facility] Emergency Dept  [address] [place] [address]  Phone: [phone]  Fax: [phone]                                    Todd Clifford   MRN: [id number]    Department: [facility] Emergency Dept   Date of Visit: 10/11/2020           After Visit Summary Signature Page    I have received my discharge instructions, and my questions have been answered. I have discussed any challenges I see with this plan with the nurse or doctor.    ..........................................................................................................................................  Patient/Patient Representative Signature      ..........................................................................................................................................  Patient Representative Print Name and Relationship to Patient    ..................................................               ................................................  Date                                   Time    ..........................................................................................................................................  Reviewed by Signature/Title    ...................................................              ..............................................  Date                                               Time          22EPIC Rev 08/18

## 2020-10-11 NOTE — DISCHARGE INSTRUCTIONS
Please check blood pressure only 1 time a day, at the same time every day discuss 1 weeks results with Latrice Kent for medication adjustment recommendations.

## 2020-10-20 NOTE — TELEPHONE ENCOUNTER
Please call daughter Natalie (MONIQUE on file) [phone]    Thank her for dropping off BP log and update on Larissa.     I did review the ED report. I agree, they should only check her BP once a day at the same time every day and keep a log. I would like to start her on Clonidine, very low dose. One tablet per day along with her Lisinopril. This is the medication that brought down her BP in the ED. I think this will help bring down the BP and help with some of the anxiety, since when our BP is high it can make us feel very anxious.     I would like them to drop of BP log ~1 week after starting new medication.     Eduard Carrington PA-C   [facility]  [place]

## 2020-10-20 NOTE — TELEPHONE ENCOUNTER
Attempted to reach the patient with the following information.  Left message for patient to return call to clinic.     Barbara Hurtado MA

## 2020-10-20 NOTE — TELEPHONE ENCOUNTER
Natalie returned call and was given message from below. She agreed with it but states that Larissa is really mad at her right now because of the whole blood pressure ordeal. She will try calling her but she doesn't thinks she will talk to her right now or take a new medication.

## 2020-10-21 NOTE — TELEPHONE ENCOUNTER
"Spoke to Natalie (see below consent auth), she states that pharmacist mentioned to her that they should be careful with the clonidine prescription as it can lead to feelings of dizziness and fatigue. Natalie is concerned to have her mother take this medication or at least wants to see if she can only take this once a day rather than twice her day as it was prescribed. Natalie states \"she lives alone and she is already very weak, I don't want her to fall and break her hip\".     Informed Natalie that I would route this to [facility] to address her concerns.    Eryn Mcmullen RN    "

## 2020-10-21 NOTE — TELEPHONE ENCOUNTER
Natalie is calling in today in regards to patient and being put on new medication. She would like to talk to someone to clarify when the patient should take the medication and the side effects. Can someone call her at [phone]? Thank you

## 2020-10-22 NOTE — TELEPHONE ENCOUNTER
OK to start with once a day. With how high her blood pressures have been, it is a safe medication to take because the alternative is a heart attack or stroke.     BPs to me in 1 week after starting.     Eduard Carrington PA-C  [facility] [place]

## 2020-11-13 NOTE — TELEPHONE ENCOUNTER
Pending Prescriptions:                       Disp   Refills    ALPRAZolam (XANAX) 0.25 MG tablet          90 tab*0        Sig: Take 0.5-1 tablets (0.125-0.25 mg) by mouth 3 times           daily as needed for anxiety (Max 3 tablets per           day) (1 month supply)        Last Written Prescription Date:  10/06/2020  Last Fill Quantity: 90,   # refills: 0  Last Office Visit: 10/06/2020  Future Office visit:         Routing refill request to provider for review/approval because:  Drug not on the [facility] refill protocol     Eryn Mcmullen RN

## 2020-12-30 NOTE — PROGRESS NOTES
Subjective     Todd Clifford is a 86 year old female who presents to clinic today for the following health issues:    HPI         {SUPERLIST (Optional):614952}  {additonal problems for provider to add (Optional):858182}    Review of Systems   {ROS COMP (Optional):116641}      Objective    There were no vitals taken for this visit.  There is no height or weight on file to calculate BMI.  Physical Exam   {Exam List (Optional):189650}    {Diagnostic Test Results (Optional):445012}        {PROVIDER CHARTING PREFERENCE:638065}

## 2021-01-01 ENCOUNTER — NURSING HOME VISIT (OUTPATIENT)
Dept: GERIATRICS | Facility: CLINIC | Age: 86
End: 2021-01-01
Payer: MEDICARE

## 2021-01-01 ENCOUNTER — DOCUMENTATION ONLY (OUTPATIENT)
Dept: OTHER | Facility: CLINIC | Age: 86
End: 2021-01-01

## 2021-01-01 ENCOUNTER — APPOINTMENT (OUTPATIENT)
Dept: PHYSICAL THERAPY | Facility: CLINIC | Age: 86
DRG: 190 | End: 2021-01-01
Attending: FAMILY MEDICINE
Payer: COMMERCIAL

## 2021-01-01 ENCOUNTER — APPOINTMENT (OUTPATIENT)
Dept: GENERAL RADIOLOGY | Facility: CLINIC | Age: 86
DRG: 190 | End: 2021-01-01
Attending: INTERNAL MEDICINE
Payer: COMMERCIAL

## 2021-01-01 ENCOUNTER — APPOINTMENT (OUTPATIENT)
Dept: OCCUPATIONAL THERAPY | Facility: CLINIC | Age: 86
DRG: 190 | End: 2021-01-01
Attending: FAMILY MEDICINE
Payer: COMMERCIAL

## 2021-01-01 ENCOUNTER — HOSPITAL ENCOUNTER (INPATIENT)
Facility: CLINIC | Age: 86
LOS: 2 days | Discharge: NURSING, REHAB OR LONG-TERM CARE | DRG: 190 | End: 2021-03-03
Attending: FAMILY MEDICINE | Admitting: FAMILY MEDICINE
Payer: COMMERCIAL

## 2021-01-01 ENCOUNTER — HOSPITAL LABORATORY (OUTPATIENT)
Dept: NURSING HOME | Facility: OTHER | Age: 86
End: 2021-01-01

## 2021-01-01 ENCOUNTER — APPOINTMENT (OUTPATIENT)
Dept: CT IMAGING | Facility: CLINIC | Age: 86
DRG: 190 | End: 2021-01-01
Attending: FAMILY MEDICINE
Payer: COMMERCIAL

## 2021-01-01 ENCOUNTER — OFFICE VISIT (OUTPATIENT)
Dept: FAMILY MEDICINE | Facility: OTHER | Age: 86
End: 2021-01-01
Payer: COMMERCIAL

## 2021-01-01 ENCOUNTER — APPOINTMENT (OUTPATIENT)
Dept: SPEECH THERAPY | Facility: CLINIC | Age: 86
DRG: 190 | End: 2021-01-01
Attending: FAMILY MEDICINE
Payer: COMMERCIAL

## 2021-01-01 ENCOUNTER — VIRTUAL VISIT (OUTPATIENT)
Dept: GERIATRICS | Facility: CLINIC | Age: 86
End: 2021-01-01
Payer: MEDICARE

## 2021-01-01 ENCOUNTER — TELEPHONE (OUTPATIENT)
Dept: FAMILY MEDICINE | Facility: OTHER | Age: 86
End: 2021-01-01

## 2021-01-01 ENCOUNTER — PATIENT OUTREACH (OUTPATIENT)
Dept: CARE COORDINATION | Facility: CLINIC | Age: 86
End: 2021-01-01

## 2021-01-01 ENCOUNTER — APPOINTMENT (OUTPATIENT)
Dept: CARDIOLOGY | Facility: CLINIC | Age: 86
DRG: 190 | End: 2021-01-01
Attending: FAMILY MEDICINE
Payer: COMMERCIAL

## 2021-01-01 ENCOUNTER — ANCILLARY PROCEDURE (OUTPATIENT)
Dept: GENERAL RADIOLOGY | Facility: OTHER | Age: 86
End: 2021-01-01
Attending: PHYSICIAN ASSISTANT
Payer: COMMERCIAL

## 2021-01-01 VITALS
HEART RATE: 76 BPM | SYSTOLIC BLOOD PRESSURE: 125 MMHG | DIASTOLIC BLOOD PRESSURE: 58 MMHG | RESPIRATION RATE: 17 BRPM | WEIGHT: 99.5 LBS | HEIGHT: 58 IN | OXYGEN SATURATION: 95 % | TEMPERATURE: 98.3 F | BODY MASS INDEX: 20.88 KG/M2

## 2021-01-01 VITALS
RESPIRATION RATE: 18 BRPM | TEMPERATURE: 100.7 F | HEART RATE: 98 BPM | WEIGHT: 106.7 LBS | OXYGEN SATURATION: 89 % | SYSTOLIC BLOOD PRESSURE: 151 MMHG | HEIGHT: 58 IN | DIASTOLIC BLOOD PRESSURE: 69 MMHG | BODY MASS INDEX: 22.4 KG/M2

## 2021-01-01 VITALS
BODY MASS INDEX: 21.69 KG/M2 | OXYGEN SATURATION: 87 % | DIASTOLIC BLOOD PRESSURE: 90 MMHG | SYSTOLIC BLOOD PRESSURE: 170 MMHG | HEIGHT: 58 IN | RESPIRATION RATE: 26 BRPM | TEMPERATURE: 98.2 F | HEART RATE: 81 BPM | WEIGHT: 103.3 LBS

## 2021-01-01 VITALS
OXYGEN SATURATION: 94 % | SYSTOLIC BLOOD PRESSURE: 128 MMHG | DIASTOLIC BLOOD PRESSURE: 72 MMHG | WEIGHT: 106 LBS | TEMPERATURE: 98 F | HEART RATE: 108 BPM | BODY MASS INDEX: 21.41 KG/M2

## 2021-01-01 VITALS
BODY MASS INDEX: 20.42 KG/M2 | RESPIRATION RATE: 17 BRPM | WEIGHT: 97.3 LBS | TEMPERATURE: 98.1 F | DIASTOLIC BLOOD PRESSURE: 58 MMHG | HEIGHT: 58 IN | OXYGEN SATURATION: 93 % | SYSTOLIC BLOOD PRESSURE: 125 MMHG | HEART RATE: 76 BPM

## 2021-01-01 VITALS
OXYGEN SATURATION: 94 % | BODY MASS INDEX: 20.98 KG/M2 | HEART RATE: 95 BPM | WEIGHT: 100.4 LBS | RESPIRATION RATE: 17 BRPM | TEMPERATURE: 97.5 F | DIASTOLIC BLOOD PRESSURE: 58 MMHG | SYSTOLIC BLOOD PRESSURE: 125 MMHG

## 2021-01-01 DIAGNOSIS — Z72.0 TOBACCO USE: ICD-10-CM

## 2021-01-01 DIAGNOSIS — R62.7 ADULT FAILURE TO THRIVE: ICD-10-CM

## 2021-01-01 DIAGNOSIS — J96.12 CHRONIC RESPIRATORY FAILURE WITH HYPOXIA AND HYPERCAPNIA (H): ICD-10-CM

## 2021-01-01 DIAGNOSIS — F17.200 SMOKER: ICD-10-CM

## 2021-01-01 DIAGNOSIS — Z13.220 SCREENING FOR HYPERLIPIDEMIA: ICD-10-CM

## 2021-01-01 DIAGNOSIS — J44.1 CHRONIC OBSTRUCTIVE PULMONARY DISEASE WITH ACUTE EXACERBATION (H): Primary | ICD-10-CM

## 2021-01-01 DIAGNOSIS — F41.9 ANXIETY: ICD-10-CM

## 2021-01-01 DIAGNOSIS — F06.0 PSYCHOTIC DISORDER DUE TO ANOTHER MEDICAL CONDITION WITH HALLUCINATIONS: ICD-10-CM

## 2021-01-01 DIAGNOSIS — F41.1 GAD (GENERALIZED ANXIETY DISORDER): ICD-10-CM

## 2021-01-01 DIAGNOSIS — E44.0 MODERATE PROTEIN-CALORIE MALNUTRITION (H): ICD-10-CM

## 2021-01-01 DIAGNOSIS — R06.02 SHORTNESS OF BREATH: Primary | ICD-10-CM

## 2021-01-01 DIAGNOSIS — J44.1 CHRONIC OBSTRUCTIVE PULMONARY DISEASE WITH ACUTE EXACERBATION (H): ICD-10-CM

## 2021-01-01 DIAGNOSIS — R60.0 BILATERAL LOWER EXTREMITY EDEMA: ICD-10-CM

## 2021-01-01 DIAGNOSIS — I50.32 CHRONIC DIASTOLIC HEART FAILURE (H): ICD-10-CM

## 2021-01-01 DIAGNOSIS — Z51.5 HOSPICE CARE PATIENT: ICD-10-CM

## 2021-01-01 DIAGNOSIS — J01.90 ACUTE SINUSITIS WITH SYMPTOMS > 10 DAYS: Primary | ICD-10-CM

## 2021-01-01 DIAGNOSIS — J96.01 ACUTE RESPIRATORY FAILURE WITH HYPOXIA (H): ICD-10-CM

## 2021-01-01 DIAGNOSIS — I50.32 CHRONIC DIASTOLIC HEART FAILURE (H): Primary | ICD-10-CM

## 2021-01-01 DIAGNOSIS — F32.0 MILD MAJOR DEPRESSION (H): ICD-10-CM

## 2021-01-01 DIAGNOSIS — R05.9 COUGH: ICD-10-CM

## 2021-01-01 DIAGNOSIS — F51.04 PSYCHOPHYSIOLOGICAL INSOMNIA: ICD-10-CM

## 2021-01-01 DIAGNOSIS — I27.20 PULMONARY HYPERTENSION (H): ICD-10-CM

## 2021-01-01 DIAGNOSIS — J96.11 CHRONIC RESPIRATORY FAILURE WITH HYPOXIA AND HYPERCAPNIA (H): ICD-10-CM

## 2021-01-01 DIAGNOSIS — K59.03 DRUG-INDUCED CONSTIPATION: ICD-10-CM

## 2021-01-01 DIAGNOSIS — E87.6 HYPOKALEMIA: ICD-10-CM

## 2021-01-01 DIAGNOSIS — I10 HYPERTENSION GOAL BP (BLOOD PRESSURE) < 140/80: ICD-10-CM

## 2021-01-01 DIAGNOSIS — F17.210 CIGARETTE SMOKER: ICD-10-CM

## 2021-01-01 DIAGNOSIS — Z11.52 ENCOUNTER FOR SCREENING LABORATORY TESTING FOR SEVERE ACUTE RESPIRATORY SYNDROME CORONAVIRUS 2 (SARS-COV-2): ICD-10-CM

## 2021-01-01 DIAGNOSIS — J96.21 ACUTE AND CHRONIC RESPIRATORY FAILURE WITH HYPOXIA (H): ICD-10-CM

## 2021-01-01 DIAGNOSIS — J96.21 ACUTE ON CHRONIC RESPIRATORY FAILURE WITH HYPOXIA (H): Primary | ICD-10-CM

## 2021-01-01 DIAGNOSIS — J96.21 ACUTE AND CHRONIC RESPIRATORY FAILURE WITH HYPOXIA (H): Primary | ICD-10-CM

## 2021-01-01 LAB
ALBUMIN SERPL-MCNC: 3.4 G/DL (ref 3.4–5)
ALBUMIN UR-MCNC: NEGATIVE MG/DL
ALP SERPL-CCNC: 109 U/L (ref 40–150)
ALT SERPL W P-5'-P-CCNC: 30 U/L (ref 0–50)
ANION GAP SERPL CALCULATED.3IONS-SCNC: 3 MMOL/L (ref 3–14)
ANION GAP SERPL CALCULATED.3IONS-SCNC: 3 MMOL/L (ref 3–14)
ANION GAP SERPL CALCULATED.3IONS-SCNC: 8 MMOL/L (ref 3–14)
APPEARANCE UR: ABNORMAL
AST SERPL W P-5'-P-CCNC: 21 U/L (ref 0–45)
BACTERIA SPEC CULT: ABNORMAL
BACTERIA SPEC CULT: NO GROWTH
BASE EXCESS BLDA CALC-SCNC: 10 MMOL/L
BASE EXCESS BLDA CALC-SCNC: 10.3 MMOL/L
BASE EXCESS BLDA CALC-SCNC: 7.8 MMOL/L
BASE EXCESS BLDA CALC-SCNC: 8.2 MMOL/L
BASE EXCESS BLDV CALC-SCNC: 10.4 MMOL/L
BASE EXCESS BLDV CALC-SCNC: 11.3 MMOL/L
BASE EXCESS BLDV CALC-SCNC: 12.3 MMOL/L
BASOPHILS # BLD AUTO: 0 10E9/L (ref 0–0.2)
BASOPHILS # BLD AUTO: 0.1 10E9/L (ref 0–0.2)
BASOPHILS NFR BLD AUTO: 0.6 %
BASOPHILS NFR BLD AUTO: 0.8 %
BILIRUB SERPL-MCNC: 0.6 MG/DL (ref 0.2–1.3)
BILIRUB UR QL STRIP: NEGATIVE
BUN SERPL-MCNC: 12 MG/DL (ref 7–30)
BUN SERPL-MCNC: 14 MG/DL (ref 7–30)
BUN SERPL-MCNC: 19 MG/DL (ref 7–30)
CALCIUM SERPL-MCNC: 8.9 MG/DL (ref 8.5–10.1)
CALCIUM SERPL-MCNC: 9.3 MG/DL (ref 8.5–10.1)
CALCIUM SERPL-MCNC: 9.3 MG/DL (ref 8.5–10.1)
CHLORIDE SERPL-SCNC: 100 MMOL/L (ref 94–109)
CHLORIDE SERPL-SCNC: 102 MMOL/L (ref 94–109)
CHLORIDE SERPL-SCNC: 95 MMOL/L (ref 94–109)
CO2 SERPL-SCNC: 35 MMOL/L (ref 20–32)
COLOR UR AUTO: YELLOW
CREAT SERPL-MCNC: 0.55 MG/DL (ref 0.52–1.04)
CREAT SERPL-MCNC: 0.56 MG/DL (ref 0.52–1.04)
CREAT SERPL-MCNC: 0.6 MG/DL (ref 0.52–1.04)
CREAT SERPL-MCNC: 0.69 MG/DL (ref 0.52–1.04)
D DIMER PPP FEU-MCNC: 0.8 UG/ML FEU (ref 0–0.5)
DIFFERENTIAL METHOD BLD: ABNORMAL
DIFFERENTIAL METHOD BLD: ABNORMAL
EOSINOPHIL NFR BLD AUTO: 1.7 %
EOSINOPHIL NFR BLD AUTO: 1.7 %
ERYTHROCYTE [DISTWIDTH] IN BLOOD BY AUTOMATED COUNT: 13.2 % (ref 10–15)
ERYTHROCYTE [DISTWIDTH] IN BLOOD BY AUTOMATED COUNT: 13.2 % (ref 10–15)
FLUAV RNA RESP QL NAA+PROBE: NEGATIVE
FLUBV RNA RESP QL NAA+PROBE: NEGATIVE
GAMMA INTERFERON BACKGROUND BLD IA-ACNC: 0 IU/ML
GFR SERPL CREATININE-BSD FRML MDRD: 79 ML/MIN/{1.73_M2}
GFR SERPL CREATININE-BSD FRML MDRD: 82 ML/MIN/{1.73_M2}
GFR SERPL CREATININE-BSD FRML MDRD: 84 ML/MIN/{1.73_M2}
GFR SERPL CREATININE-BSD FRML MDRD: 85 ML/MIN/{1.73_M2}
GLUCOSE SERPL-MCNC: 103 MG/DL (ref 70–99)
GLUCOSE SERPL-MCNC: 112 MG/DL (ref 70–99)
GLUCOSE SERPL-MCNC: 128 MG/DL (ref 70–99)
GLUCOSE UR STRIP-MCNC: NEGATIVE MG/DL
HCO3 BLD-SCNC: 36 MMOL/L (ref 21–28)
HCO3 BLD-SCNC: 37 MMOL/L (ref 21–28)
HCO3 BLD-SCNC: 41 MMOL/L (ref 21–28)
HCO3 BLDV-SCNC: 36 MMOL/L (ref 21–28)
HCO3 BLDV-SCNC: 38 MMOL/L (ref 21–28)
HCO3 BLDV-SCNC: 39 MMOL/L (ref 21–28)
HCT VFR BLD AUTO: 37.3 % (ref 35–47)
HCT VFR BLD AUTO: 42.6 % (ref 35–47)
HGB BLD-MCNC: 12 G/DL (ref 11.7–15.7)
HGB BLD-MCNC: 12.7 G/DL (ref 11.7–15.7)
HGB UR QL STRIP: ABNORMAL
IMM GRANULOCYTES # BLD: 0 10E9/L (ref 0–0.4)
IMM GRANULOCYTES # BLD: 0 10E9/L (ref 0–0.4)
IMM GRANULOCYTES NFR BLD: 0.3 %
IMM GRANULOCYTES NFR BLD: 0.5 %
KETONES UR STRIP-MCNC: NEGATIVE MG/DL
LABORATORY COMMENT REPORT: NORMAL
LACTATE BLD-SCNC: 1.2 MMOL/L (ref 0.7–2)
LACTATE BLD-SCNC: 1.9 MMOL/L (ref 0.7–2)
LACTATE BLD-SCNC: 1.9 MMOL/L (ref 0.7–2)
LEUKOCYTE ESTERASE UR QL STRIP: NEGATIVE
LYMPHOCYTES # BLD AUTO: 0.7 10E9/L (ref 0.8–5.3)
LYMPHOCYTES # BLD AUTO: 0.8 10E9/L (ref 0.8–5.3)
LYMPHOCYTES NFR BLD AUTO: 11 %
LYMPHOCYTES NFR BLD AUTO: 12.7 %
Lab: ABNORMAL
Lab: NORMAL
M TB IFN-G CD4+ BCKGRND COR BLD-ACNC: 0.11 IU/ML
M TB TUBERC IFN-G BLD QL: ABNORMAL
MAGNESIUM SERPL-MCNC: 2.5 MG/DL (ref 1.6–2.3)
MCH RBC QN AUTO: 31.6 PG (ref 26.5–33)
MCH RBC QN AUTO: 31.7 PG (ref 26.5–33)
MCHC RBC AUTO-ENTMCNC: 29.8 G/DL (ref 31.5–36.5)
MCHC RBC AUTO-ENTMCNC: 32.2 G/DL (ref 31.5–36.5)
MCV RBC AUTO: 106 FL (ref 78–100)
MCV RBC AUTO: 99 FL (ref 78–100)
MITOGEN IGNF BCKGRD COR BLD-ACNC: 0 IU/ML
MITOGEN IGNF BCKGRD COR BLD-ACNC: 0 IU/ML
MONOCYTES # BLD AUTO: 0.7 10E9/L (ref 0–1.3)
MONOCYTES # BLD AUTO: 0.9 10E9/L (ref 0–1.3)
MONOCYTES NFR BLD AUTO: 10.3 %
MONOCYTES NFR BLD AUTO: 13.5 %
MUCOUS THREADS #/AREA URNS LPF: PRESENT /LPF
NEUTROPHILS # BLD AUTO: 4.6 10E9/L (ref 1.6–8.3)
NEUTROPHILS # BLD AUTO: 4.7 10E9/L (ref 1.6–8.3)
NEUTROPHILS NFR BLD AUTO: 72.5 %
NEUTROPHILS NFR BLD AUTO: 74.4 %
NITRATE UR QL: NEGATIVE
NRBC # BLD AUTO: 0 10*3/UL
NRBC # BLD AUTO: 0 10*3/UL
NRBC BLD AUTO-RTO: 0 /100
NRBC BLD AUTO-RTO: 0 /100
NT-PROBNP SERPL-MCNC: 2654 PG/ML (ref 0–1800)
O2/TOTAL GAS SETTING VFR VENT: 21 %
O2/TOTAL GAS SETTING VFR VENT: 21 %
O2/TOTAL GAS SETTING VFR VENT: 24 %
O2/TOTAL GAS SETTING VFR VENT: 24 %
O2/TOTAL GAS SETTING VFR VENT: 28 %
O2/TOTAL GAS SETTING VFR VENT: 92 %
OXYHGB MFR BLD: 72 % (ref 92–100)
PCO2 BLD: 49 MM HG (ref 35–45)
PCO2 BLD: 53 MM HG (ref 35–45)
PCO2 BLD: 65 MM HG (ref 35–45)
PCO2 BLD: 68 MM HG (ref 35–45)
PCO2 BLD: 89 MM HG (ref 35–45)
PCO2 BLDV: 48 MM HG (ref 40–50)
PCO2 BLDV: 55 MM HG (ref 40–50)
PCO2 BLDV: 57 MM HG (ref 40–50)
PH BLD: 7.27 PH (ref 7.35–7.45)
PH BLD: 7.33 PH (ref 7.35–7.45)
PH BLD: 7.35 PH (ref 7.35–7.45)
PH BLD: 7.44 PH (ref 7.35–7.45)
PH BLD: 7.49 PH (ref 7.35–7.45)
PH BLDV: 7.44 PH (ref 7.32–7.43)
PH BLDV: 7.44 PH (ref 7.32–7.43)
PH BLDV: 7.48 PH (ref 7.32–7.43)
PH UR STRIP: 5 PH (ref 5–7)
PHOSPHATE SERPL-MCNC: 2.7 MG/DL (ref 2.5–4.5)
PLATELET # BLD AUTO: 229 10E9/L (ref 150–450)
PLATELET # BLD AUTO: 271 10E9/L (ref 150–450)
PO2 BLD: 32 MM HG (ref 80–105)
PO2 BLD: 41 MM HG (ref 80–105)
PO2 BLD: 44 MM HG (ref 80–105)
PO2 BLD: 60 MM HG (ref 80–105)
PO2 BLD: 61 MM HG (ref 80–105)
PO2 BLDV: 38 MM HG (ref 25–47)
PO2 BLDV: 41 MM HG (ref 25–47)
PO2 BLDV: 63 MM HG (ref 25–47)
POTASSIUM SERPL-SCNC: 2.8 MMOL/L (ref 3.4–5.3)
POTASSIUM SERPL-SCNC: 3 MMOL/L (ref 3.4–5.3)
POTASSIUM SERPL-SCNC: 3.7 MMOL/L (ref 3.4–5.3)
POTASSIUM SERPL-SCNC: 3.8 MMOL/L (ref 3.4–5.3)
POTASSIUM SERPL-SCNC: 4 MMOL/L (ref 3.4–5.3)
POTASSIUM SERPL-SCNC: 4.4 MMOL/L (ref 3.4–5.3)
PROCALCITONIN SERPL-MCNC: 0.15 NG/ML
PROT SERPL-MCNC: 7.2 G/DL (ref 6.8–8.8)
RBC # BLD AUTO: 3.78 10E12/L (ref 3.8–5.2)
RBC # BLD AUTO: 4.02 10E12/L (ref 3.8–5.2)
RBC #/AREA URNS AUTO: 33 /HPF (ref 0–2)
RSV RNA SPEC QL NAA+PROBE: NORMAL
SARS-COV-2 RNA RESP QL NAA+PROBE: NEGATIVE
SARS-COV-2 RNA RESP QL NAA+PROBE: NOT DETECTED
SODIUM SERPL-SCNC: 138 MMOL/L (ref 133–144)
SODIUM SERPL-SCNC: 138 MMOL/L (ref 133–144)
SODIUM SERPL-SCNC: 140 MMOL/L (ref 133–144)
SOURCE: ABNORMAL
SP GR UR STRIP: 1.02 (ref 1–1.03)
SPECIMEN SOURCE: ABNORMAL
SPECIMEN SOURCE: NORMAL
TROPONIN I SERPL-MCNC: 0.02 UG/L (ref 0–0.04)
UROBILINOGEN UR STRIP-MCNC: 2 MG/DL (ref 0–2)
WBC # BLD AUTO: 6.3 10E9/L (ref 4–11)
WBC # BLD AUTO: 6.4 10E9/L (ref 4–11)
WBC #/AREA URNS AUTO: 7 /HPF (ref 0–5)

## 2021-01-01 PROCEDURE — 93306 TTE W/DOPPLER COMPLETE: CPT

## 2021-01-01 PROCEDURE — 999N000156 HC STATISTIC RCP CONSULT EA 30 MIN

## 2021-01-01 PROCEDURE — 36600 WITHDRAWAL OF ARTERIAL BLOOD: CPT

## 2021-01-01 PROCEDURE — 87636 SARSCOV2 & INF A&B AMP PRB: CPT | Performed by: FAMILY MEDICINE

## 2021-01-01 PROCEDURE — C9803 HOPD COVID-19 SPEC COLLECT: HCPCS | Performed by: FAMILY MEDICINE

## 2021-01-01 PROCEDURE — 96365 THER/PROPH/DIAG IV INF INIT: CPT | Mod: 59 | Performed by: FAMILY MEDICINE

## 2021-01-01 PROCEDURE — 80048 BASIC METABOLIC PNL TOTAL CA: CPT | Performed by: FAMILY MEDICINE

## 2021-01-01 PROCEDURE — 87800 DETECT AGNT MULT DNA DIREC: CPT | Performed by: INTERNAL MEDICINE

## 2021-01-01 PROCEDURE — 999N000123 HC STATISTIC OXYGEN O2DAILY TECH TIME

## 2021-01-01 PROCEDURE — 999N000105 HC STATISTIC NO DOCUMENTATION TO SUPPORT CHARGE

## 2021-01-01 PROCEDURE — 97165 OT EVAL LOW COMPLEX 30 MIN: CPT | Mod: GO

## 2021-01-01 PROCEDURE — 250N000009 HC RX 250: Performed by: FAMILY MEDICINE

## 2021-01-01 PROCEDURE — 99309 SBSQ NF CARE MODERATE MDM 30: CPT | Mod: GV | Performed by: NURSE PRACTITIONER

## 2021-01-01 PROCEDURE — 250N000013 HC RX MED GY IP 250 OP 250 PS 637: Performed by: FAMILY MEDICINE

## 2021-01-01 PROCEDURE — G0378 HOSPITAL OBSERVATION PER HR: HCPCS

## 2021-01-01 PROCEDURE — 84484 ASSAY OF TROPONIN QUANT: CPT | Performed by: FAMILY MEDICINE

## 2021-01-01 PROCEDURE — 250N000013 HC RX MED GY IP 250 OP 250 PS 637: Performed by: NURSE PRACTITIONER

## 2021-01-01 PROCEDURE — 87040 BLOOD CULTURE FOR BACTERIA: CPT | Performed by: INTERNAL MEDICINE

## 2021-01-01 PROCEDURE — 999N000157 HC STATISTIC RCP TIME EA 10 MIN

## 2021-01-01 PROCEDURE — 83880 ASSAY OF NATRIURETIC PEPTIDE: CPT | Performed by: FAMILY MEDICINE

## 2021-01-01 PROCEDURE — 93005 ELECTROCARDIOGRAM TRACING: CPT | Performed by: FAMILY MEDICINE

## 2021-01-01 PROCEDURE — 99239 HOSP IP/OBS DSCHRG MGMT >30: CPT | Performed by: INTERNAL MEDICINE

## 2021-01-01 PROCEDURE — 82803 BLOOD GASES ANY COMBINATION: CPT | Performed by: FAMILY MEDICINE

## 2021-01-01 PROCEDURE — 36415 COLL VENOUS BLD VENIPUNCTURE: CPT | Performed by: FAMILY MEDICINE

## 2021-01-01 PROCEDURE — 83605 ASSAY OF LACTIC ACID: CPT | Performed by: FAMILY MEDICINE

## 2021-01-01 PROCEDURE — 83735 ASSAY OF MAGNESIUM: CPT | Performed by: FAMILY MEDICINE

## 2021-01-01 PROCEDURE — 258N000003 HC RX IP 258 OP 636: Performed by: HOSPITALIST

## 2021-01-01 PROCEDURE — 82803 BLOOD GASES ANY COMBINATION: CPT | Performed by: HOSPITALIST

## 2021-01-01 PROCEDURE — 84132 ASSAY OF SERUM POTASSIUM: CPT | Performed by: INTERNAL MEDICINE

## 2021-01-01 PROCEDURE — 99310 SBSQ NF CARE HIGH MDM 45: CPT | Mod: GV | Performed by: NURSE PRACTITIONER

## 2021-01-01 PROCEDURE — 82565 ASSAY OF CREATININE: CPT | Performed by: FAMILY MEDICINE

## 2021-01-01 PROCEDURE — 250N000011 HC RX IP 250 OP 636: Performed by: FAMILY MEDICINE

## 2021-01-01 PROCEDURE — 82805 BLOOD GASES W/O2 SATURATION: CPT | Performed by: HOSPITALIST

## 2021-01-01 PROCEDURE — 85379 FIBRIN DEGRADATION QUANT: CPT | Performed by: FAMILY MEDICINE

## 2021-01-01 PROCEDURE — 99220 PR INITIAL OBSERVATION CARE,LEVEL III: CPT | Performed by: FAMILY MEDICINE

## 2021-01-01 PROCEDURE — 87186 SC STD MICRODIL/AGAR DIL: CPT | Performed by: INTERNAL MEDICINE

## 2021-01-01 PROCEDURE — 999N000226 HC STATISTIC SLP IP EVAL DEFER: Performed by: SPEECH-LANGUAGE PATHOLOGIST

## 2021-01-01 PROCEDURE — 84145 PROCALCITONIN (PCT): CPT | Performed by: INTERNAL MEDICINE

## 2021-01-01 PROCEDURE — 93010 ELECTROCARDIOGRAM REPORT: CPT | Performed by: FAMILY MEDICINE

## 2021-01-01 PROCEDURE — 84100 ASSAY OF PHOSPHORUS: CPT | Performed by: FAMILY MEDICINE

## 2021-01-01 PROCEDURE — 99233 SBSQ HOSP IP/OBS HIGH 50: CPT | Performed by: FAMILY MEDICINE

## 2021-01-01 PROCEDURE — U0005 INFEC AGEN DETEC AMPLI PROBE: HCPCS | Performed by: PHYSICIAN ASSISTANT

## 2021-01-01 PROCEDURE — 250N000011 HC RX IP 250 OP 636: Performed by: INTERNAL MEDICINE

## 2021-01-01 PROCEDURE — 84132 ASSAY OF SERUM POTASSIUM: CPT | Performed by: FAMILY MEDICINE

## 2021-01-01 PROCEDURE — 99233 SBSQ HOSP IP/OBS HIGH 50: CPT | Performed by: INTERNAL MEDICINE

## 2021-01-01 PROCEDURE — 36415 COLL VENOUS BLD VENIPUNCTURE: CPT | Performed by: INTERNAL MEDICINE

## 2021-01-01 PROCEDURE — 93306 TTE W/DOPPLER COMPLETE: CPT | Mod: 26 | Performed by: INTERNAL MEDICINE

## 2021-01-01 PROCEDURE — 99285 EMERGENCY DEPT VISIT HI MDM: CPT | Mod: 25 | Performed by: FAMILY MEDICINE

## 2021-01-01 PROCEDURE — 83605 ASSAY OF LACTIC ACID: CPT | Performed by: INTERNAL MEDICINE

## 2021-01-01 PROCEDURE — 250N000013 HC RX MED GY IP 250 OP 250 PS 637: Performed by: INTERNAL MEDICINE

## 2021-01-01 PROCEDURE — 92610 EVALUATE SWALLOWING FUNCTION: CPT | Mod: GN | Performed by: SPEECH-LANGUAGE PATHOLOGIST

## 2021-01-01 PROCEDURE — 96372 THER/PROPH/DIAG INJ SC/IM: CPT | Performed by: INTERNAL MEDICINE

## 2021-01-01 PROCEDURE — 99215 OFFICE O/P EST HI 40 MIN: CPT | Performed by: PHYSICIAN ASSISTANT

## 2021-01-01 PROCEDURE — 80053 COMPREHEN METABOLIC PANEL: CPT | Performed by: FAMILY MEDICINE

## 2021-01-01 PROCEDURE — 81001 URINALYSIS AUTO W/SCOPE: CPT | Performed by: INTERNAL MEDICINE

## 2021-01-01 PROCEDURE — U0003 INFECTIOUS AGENT DETECTION BY NUCLEIC ACID (DNA OR RNA); SEVERE ACUTE RESPIRATORY SYNDROME CORONAVIRUS 2 (SARS-COV-2) (CORONAVIRUS DISEASE [COVID-19]), AMPLIFIED PROBE TECHNIQUE, MAKING USE OF HIGH THROUGHPUT TECHNOLOGIES AS DESCRIBED BY CMS-2020-01-R: HCPCS | Performed by: PHYSICIAN ASSISTANT

## 2021-01-01 PROCEDURE — 200N000001 HC R&B ICU

## 2021-01-01 PROCEDURE — 99305 1ST NF CARE MODERATE MDM 35: CPT | Mod: 95 | Performed by: FAMILY MEDICINE

## 2021-01-01 PROCEDURE — 258N000003 HC RX IP 258 OP 636: Performed by: INTERNAL MEDICINE

## 2021-01-01 PROCEDURE — 85025 COMPLETE CBC W/AUTO DIFF WBC: CPT | Performed by: FAMILY MEDICINE

## 2021-01-01 PROCEDURE — 97161 PT EVAL LOW COMPLEX 20 MIN: CPT | Mod: GP | Performed by: PHYSICAL THERAPIST

## 2021-01-01 PROCEDURE — 94640 AIRWAY INHALATION TREATMENT: CPT | Performed by: FAMILY MEDICINE

## 2021-01-01 PROCEDURE — 71045 X-RAY EXAM CHEST 1 VIEW: CPT

## 2021-01-01 PROCEDURE — 120N000001 HC R&B MED SURG/OB

## 2021-01-01 PROCEDURE — 87077 CULTURE AEROBIC IDENTIFY: CPT | Performed by: INTERNAL MEDICINE

## 2021-01-01 PROCEDURE — 250N000012 HC RX MED GY IP 250 OP 636 PS 637: Performed by: INTERNAL MEDICINE

## 2021-01-01 PROCEDURE — 96366 THER/PROPH/DIAG IV INF ADDON: CPT | Performed by: FAMILY MEDICINE

## 2021-01-01 PROCEDURE — 71046 X-RAY EXAM CHEST 2 VIEWS: CPT | Performed by: RADIOLOGY

## 2021-01-01 PROCEDURE — 71275 CT ANGIOGRAPHY CHEST: CPT

## 2021-01-01 PROCEDURE — 85025 COMPLETE CBC W/AUTO DIFF WBC: CPT | Performed by: INTERNAL MEDICINE

## 2021-01-01 PROCEDURE — 94640 AIRWAY INHALATION TREATMENT: CPT

## 2021-01-01 RX ORDER — POTASSIUM CHLORIDE 7.45 MG/ML
10 INJECTION INTRAVENOUS CONTINUOUS
Status: DISCONTINUED | OUTPATIENT
Start: 2021-01-01 | End: 2021-01-01 | Stop reason: CLARIF

## 2021-01-01 RX ORDER — GLIPIZIDE 10 MG/1
1 TABLET ORAL
DISCHARGE
Start: 2021-01-01

## 2021-01-01 RX ORDER — PREDNISONE 20 MG/1
20 TABLET ORAL DAILY
Status: DISCONTINUED | OUTPATIENT
Start: 2021-01-01 | End: 2021-01-01 | Stop reason: HOSPADM

## 2021-01-01 RX ORDER — QUETIAPINE FUMARATE 25 MG/1
25 TABLET, FILM COATED ORAL EVERY 6 HOURS PRN
Status: DISCONTINUED | OUTPATIENT
Start: 2021-01-01 | End: 2021-01-01 | Stop reason: HOSPADM

## 2021-01-01 RX ORDER — QUETIAPINE FUMARATE 25 MG/1
12.5 TABLET, FILM COATED ORAL EVERY 6 HOURS PRN
Qty: 15 TABLET | Refills: 0 | Status: SHIPPED | OUTPATIENT
Start: 2021-01-01 | End: 2021-01-01

## 2021-01-01 RX ORDER — HALOPERIDOL 2 MG/ML
SOLUTION ORAL
Start: 2021-01-01 | End: 2021-01-01

## 2021-01-01 RX ORDER — GLYCOPYRROLATE 2 MG/1
2-4 TABLET ORAL EVERY 4 HOURS PRN
DISCHARGE
Start: 2021-01-01 | End: 2021-01-01

## 2021-01-01 RX ORDER — MORPHINE SULFATE 100 MG/5ML
SOLUTION ORAL
Qty: 30 ML | Refills: 0 | Status: SHIPPED | OUTPATIENT
Start: 2021-01-01

## 2021-01-01 RX ORDER — ONDANSETRON 2 MG/ML
4 INJECTION INTRAMUSCULAR; INTRAVENOUS EVERY 6 HOURS PRN
Status: DISCONTINUED | OUTPATIENT
Start: 2021-01-01 | End: 2021-01-01

## 2021-01-01 RX ORDER — POTASSIUM CHLORIDE 1500 MG/1
40 TABLET, EXTENDED RELEASE ORAL ONCE
Status: COMPLETED | OUTPATIENT
Start: 2021-01-01 | End: 2021-01-01

## 2021-01-01 RX ORDER — HALOPERIDOL 2 MG/ML
1 SOLUTION ORAL EVERY 6 HOURS
Start: 2021-01-01

## 2021-01-01 RX ORDER — CALCIUM CARBONATE 500 MG/1
500 TABLET, CHEWABLE ORAL 3 TIMES DAILY PRN
Status: DISCONTINUED | OUTPATIENT
Start: 2021-01-01 | End: 2021-01-01 | Stop reason: HOSPADM

## 2021-01-01 RX ORDER — PROCHLORPERAZINE MALEATE 5 MG
5 TABLET ORAL EVERY 6 HOURS PRN
Status: DISCONTINUED | OUTPATIENT
Start: 2021-01-01 | End: 2021-01-01

## 2021-01-01 RX ORDER — GLYCOPYRROLATE 1 MG/1
2-4 TABLET ORAL EVERY 4 HOURS PRN
Status: DISCONTINUED | OUTPATIENT
Start: 2021-01-01 | End: 2021-01-01 | Stop reason: HOSPADM

## 2021-01-01 RX ORDER — ATROPINE SULFATE 10 MG/ML
2 SOLUTION/ DROPS OPHTHALMIC
Start: 2021-01-01

## 2021-01-01 RX ORDER — MINERAL OIL/HYDROPHIL PETROLAT
OINTMENT (GRAM) TOPICAL EVERY 8 HOURS PRN
DISCHARGE
Start: 2021-01-01

## 2021-01-01 RX ORDER — ACETAMINOPHEN 650 MG/1
650 SUPPOSITORY RECTAL EVERY 4 HOURS PRN
Status: DISCONTINUED | OUTPATIENT
Start: 2021-01-01 | End: 2021-01-01

## 2021-01-01 RX ORDER — MORPHINE SULFATE 10 MG/5ML
5 SOLUTION ORAL EVERY 4 HOURS PRN
Status: DISCONTINUED | OUTPATIENT
Start: 2021-01-01 | End: 2021-01-01 | Stop reason: HOSPADM

## 2021-01-01 RX ORDER — ALPRAZOLAM 0.25 MG
TABLET ORAL
Qty: 90 TABLET | Refills: 3 | Status: ON HOLD | OUTPATIENT
Start: 2021-01-01 | End: 2021-01-01

## 2021-01-01 RX ORDER — AMOXICILLIN 250 MG
1 CAPSULE ORAL 2 TIMES DAILY
DISCHARGE
Start: 2021-01-01

## 2021-01-01 RX ORDER — ALPRAZOLAM 0.25 MG
0.25 TABLET ORAL 3 TIMES DAILY PRN
Status: DISCONTINUED | OUTPATIENT
Start: 2021-01-01 | End: 2021-01-01 | Stop reason: HOSPADM

## 2021-01-01 RX ORDER — MINERAL OIL/HYDROPHIL PETROLAT
OINTMENT (GRAM) TOPICAL EVERY 8 HOURS PRN
Status: DISCONTINUED | OUTPATIENT
Start: 2021-01-01 | End: 2021-01-01 | Stop reason: HOSPADM

## 2021-01-01 RX ORDER — SALIVA STIMULANT COMB. NO.3
1 SPRAY, NON-AEROSOL (ML) MUCOUS MEMBRANE
DISCHARGE
Start: 2021-01-01

## 2021-01-01 RX ORDER — ATROPINE SULFATE 10 MG/ML
1-2 SOLUTION/ DROPS OPHTHALMIC
Status: DISCONTINUED | OUTPATIENT
Start: 2021-01-01 | End: 2021-01-01 | Stop reason: HOSPADM

## 2021-01-01 RX ORDER — CLONIDINE HYDROCHLORIDE 0.1 MG/1
0.1 TABLET ORAL DAILY
Status: DISCONTINUED | OUTPATIENT
Start: 2021-01-01 | End: 2021-01-01 | Stop reason: HOSPADM

## 2021-01-01 RX ORDER — ALBUTEROL SULFATE 0.83 MG/ML
2.5 SOLUTION RESPIRATORY (INHALATION)
Status: DISCONTINUED | OUTPATIENT
Start: 2021-01-01 | End: 2021-01-01 | Stop reason: HOSPADM

## 2021-01-01 RX ORDER — ACETAMINOPHEN 325 MG/1
650 TABLET ORAL EVERY 6 HOURS PRN
Status: DISCONTINUED | OUTPATIENT
Start: 2021-01-01 | End: 2021-01-01 | Stop reason: HOSPADM

## 2021-01-01 RX ORDER — NICOTINE 21 MG/24HR
1 PATCH, TRANSDERMAL 24 HOURS TRANSDERMAL DAILY
DISCHARGE
Start: 2021-01-01

## 2021-01-01 RX ORDER — POTASSIUM CHLORIDE 750 MG/1
10 TABLET, EXTENDED RELEASE ORAL ONCE
Status: COMPLETED | OUTPATIENT
Start: 2021-01-01 | End: 2021-01-01

## 2021-01-01 RX ORDER — OLANZAPINE 5 MG/1
5 TABLET, ORALLY DISINTEGRATING ORAL EVERY 6 HOURS PRN
Qty: 15 TABLET | Refills: 0 | Status: SHIPPED | OUTPATIENT
Start: 2021-01-01 | End: 2021-01-01

## 2021-01-01 RX ORDER — PROCHLORPERAZINE MALEATE 5 MG
5 TABLET ORAL EVERY 6 HOURS PRN
DISCHARGE
Start: 2021-01-01 | End: 2021-01-01

## 2021-01-01 RX ORDER — PREDNISONE 5 MG/1
20 TABLET ORAL DAILY
DISCHARGE
Start: 2021-01-01 | End: 2021-01-01

## 2021-01-01 RX ORDER — PROCHLORPERAZINE 25 MG
12.5 SUPPOSITORY, RECTAL RECTAL EVERY 12 HOURS PRN
Status: DISCONTINUED | OUTPATIENT
Start: 2021-01-01 | End: 2021-01-01

## 2021-01-01 RX ORDER — CEPHALEXIN 500 MG/1
500 CAPSULE ORAL 2 TIMES DAILY
Qty: 20 CAPSULE | Refills: 0 | Status: SHIPPED | OUTPATIENT
Start: 2021-01-01 | End: 2021-01-01

## 2021-01-01 RX ORDER — ACETAMINOPHEN 325 MG/1
650 TABLET ORAL EVERY 4 HOURS PRN
Status: DISCONTINUED | OUTPATIENT
Start: 2021-01-01 | End: 2021-01-01

## 2021-01-01 RX ORDER — LISINOPRIL 30 MG/1
30 TABLET ORAL DAILY
Qty: 90 TABLET | Refills: 1 | Status: SHIPPED | OUTPATIENT
Start: 2021-01-01 | End: 2021-01-01

## 2021-01-01 RX ORDER — BISACODYL 10 MG
10 SUPPOSITORY, RECTAL RECTAL
Status: DISCONTINUED | OUTPATIENT
Start: 2021-01-01 | End: 2021-01-01 | Stop reason: HOSPADM

## 2021-01-01 RX ORDER — ONDANSETRON 4 MG/1
4 TABLET, ORALLY DISINTEGRATING ORAL EVERY 6 HOURS PRN
Status: DISCONTINUED | OUTPATIENT
Start: 2021-01-01 | End: 2021-01-01

## 2021-01-01 RX ORDER — POTASSIUM CHLORIDE 20MEQ/15ML
10 LIQUID (ML) ORAL ONCE
Status: COMPLETED | OUTPATIENT
Start: 2021-01-01 | End: 2021-01-01

## 2021-01-01 RX ORDER — MORPHINE SULFATE 100 MG/5ML
5 SOLUTION ORAL EVERY 4 HOURS PRN
Status: DISCONTINUED | OUTPATIENT
Start: 2021-01-01 | End: 2021-01-01 | Stop reason: HOSPADM

## 2021-01-01 RX ORDER — AMOXICILLIN 250 MG
1 CAPSULE ORAL 2 TIMES DAILY
Status: DISCONTINUED | OUTPATIENT
Start: 2021-01-01 | End: 2021-01-01 | Stop reason: HOSPADM

## 2021-01-01 RX ORDER — AMOXICILLIN 250 MG
1 CAPSULE ORAL 2 TIMES DAILY PRN
DISCHARGE
Start: 2021-01-01

## 2021-01-01 RX ORDER — AMOXICILLIN 250 MG
1 CAPSULE ORAL 2 TIMES DAILY PRN
Status: DISCONTINUED | OUTPATIENT
Start: 2021-01-01 | End: 2021-01-01 | Stop reason: HOSPADM

## 2021-01-01 RX ORDER — IPRATROPIUM BROMIDE AND ALBUTEROL SULFATE 2.5; .5 MG/3ML; MG/3ML
3 SOLUTION RESPIRATORY (INHALATION) ONCE
Status: COMPLETED | OUTPATIENT
Start: 2021-01-01 | End: 2021-01-01

## 2021-01-01 RX ORDER — CLONIDINE HYDROCHLORIDE 0.1 MG/1
0.1 TABLET ORAL DAILY
Qty: 90 TABLET | Refills: 1 | Status: SHIPPED | OUTPATIENT
Start: 2021-01-01 | End: 2021-01-01

## 2021-01-01 RX ORDER — IOPAMIDOL 755 MG/ML
500 INJECTION, SOLUTION INTRAVASCULAR ONCE
Status: COMPLETED | OUTPATIENT
Start: 2021-01-01 | End: 2021-01-01

## 2021-01-01 RX ORDER — OLANZAPINE 10 MG/2ML
5 INJECTION, POWDER, FOR SOLUTION INTRAMUSCULAR
Status: COMPLETED | OUTPATIENT
Start: 2021-01-01 | End: 2021-01-01

## 2021-01-01 RX ORDER — OLANZAPINE 10 MG/2ML
5 INJECTION, POWDER, FOR SOLUTION INTRAMUSCULAR
Status: DISCONTINUED | OUTPATIENT
Start: 2021-01-01 | End: 2021-01-01 | Stop reason: HOSPADM

## 2021-01-01 RX ORDER — NICOTINE 21 MG/24HR
1 PATCH, TRANSDERMAL 24 HOURS TRANSDERMAL DAILY
Status: DISCONTINUED | OUTPATIENT
Start: 2021-01-01 | End: 2021-01-01 | Stop reason: HOSPADM

## 2021-01-01 RX ORDER — MORPHINE SULFATE 100 MG/5ML
SOLUTION ORAL
Qty: 30 ML | Refills: 0 | Status: SHIPPED | OUTPATIENT
Start: 2021-01-01 | End: 2021-01-01

## 2021-01-01 RX ORDER — AMOXICILLIN 250 MG
2 CAPSULE ORAL 2 TIMES DAILY PRN
Status: DISCONTINUED | OUTPATIENT
Start: 2021-01-01 | End: 2021-01-01 | Stop reason: HOSPADM

## 2021-01-01 RX ORDER — MORPHINE SULFATE 100 MG/5ML
2.5 SOLUTION ORAL EVERY 6 HOURS PRN
Qty: 30 ML | Refills: 0 | Status: SHIPPED | OUTPATIENT
Start: 2021-01-01 | End: 2021-01-01

## 2021-01-01 RX ORDER — PROCHLORPERAZINE 25 MG
12.5 SUPPOSITORY, RECTAL RECTAL EVERY 12 HOURS PRN
Status: DISCONTINUED | OUTPATIENT
Start: 2021-01-01 | End: 2021-01-01 | Stop reason: HOSPADM

## 2021-01-01 RX ORDER — GLIPIZIDE 10 MG/1
1 TABLET ORAL
Status: DISCONTINUED | OUTPATIENT
Start: 2021-01-01 | End: 2021-01-01 | Stop reason: HOSPADM

## 2021-01-01 RX ORDER — QUETIAPINE FUMARATE 25 MG/1
12.5 TABLET, FILM COATED ORAL AT BEDTIME
Qty: 15 TABLET | Refills: 0 | Status: SHIPPED | OUTPATIENT
Start: 2021-01-01 | End: 2021-01-01

## 2021-01-01 RX ORDER — ATROPINE SULFATE 10 MG/ML
1-2 SOLUTION/ DROPS OPHTHALMIC
DISCHARGE
Start: 2021-01-01 | End: 2021-01-01

## 2021-01-01 RX ORDER — ALBUTEROL SULFATE 0.83 MG/ML
2.5 SOLUTION RESPIRATORY (INHALATION)
DISCHARGE
Start: 2021-01-01

## 2021-01-01 RX ORDER — ACETAMINOPHEN 325 MG/1
650 TABLET ORAL EVERY 6 HOURS PRN
DISCHARGE
Start: 2021-01-01 | End: 2021-01-01

## 2021-01-01 RX ORDER — ACETAMINOPHEN 650 MG/1
650 SUPPOSITORY RECTAL EVERY 6 HOURS PRN
Status: DISCONTINUED | OUTPATIENT
Start: 2021-01-01 | End: 2021-01-01 | Stop reason: HOSPADM

## 2021-01-01 RX ORDER — ONDANSETRON 4 MG/1
4 TABLET, ORALLY DISINTEGRATING ORAL EVERY 6 HOURS PRN
Status: DISCONTINUED | OUTPATIENT
Start: 2021-01-01 | End: 2021-01-01 | Stop reason: HOSPADM

## 2021-01-01 RX ORDER — PROCHLORPERAZINE MALEATE 5 MG
5 TABLET ORAL EVERY 6 HOURS PRN
Status: DISCONTINUED | OUTPATIENT
Start: 2021-01-01 | End: 2021-01-01 | Stop reason: HOSPADM

## 2021-01-01 RX ORDER — ONDANSETRON 4 MG/1
4 TABLET, ORALLY DISINTEGRATING ORAL EVERY 6 HOURS PRN
DISCHARGE
Start: 2021-01-01 | End: 2021-01-01

## 2021-01-01 RX ORDER — ONDANSETRON 2 MG/ML
4 INJECTION INTRAMUSCULAR; INTRAVENOUS EVERY 6 HOURS PRN
Status: DISCONTINUED | OUTPATIENT
Start: 2021-01-01 | End: 2021-01-01 | Stop reason: HOSPADM

## 2021-01-01 RX ORDER — POLYETHYLENE GLYCOL 3350 17 G/17G
17 POWDER, FOR SOLUTION ORAL DAILY PRN
Status: DISCONTINUED | OUTPATIENT
Start: 2021-01-01 | End: 2021-01-01 | Stop reason: HOSPADM

## 2021-01-01 RX ORDER — AZITHROMYCIN 250 MG/1
250 TABLET, FILM COATED ORAL DAILY
Qty: 6 TABLET | Refills: 0 | DISCHARGE
Start: 2021-01-01 | End: 2021-01-01

## 2021-01-01 RX ORDER — MORPHINE SULFATE 100 MG/5ML
SOLUTION ORAL
Qty: 30 ML | Refills: 0
Start: 2021-01-01 | End: 2021-01-01

## 2021-01-01 RX ORDER — ALPRAZOLAM 0.25 MG
0.25 TABLET ORAL 3 TIMES DAILY PRN
Qty: 15 TABLET | Refills: 0 | Status: SHIPPED | OUTPATIENT
Start: 2021-01-01 | End: 2021-01-01

## 2021-01-01 RX ORDER — LISINOPRIL 10 MG/1
30 TABLET ORAL DAILY
Status: DISCONTINUED | OUTPATIENT
Start: 2021-01-01 | End: 2021-01-01 | Stop reason: HOSPADM

## 2021-01-01 RX ORDER — LORAZEPAM 2 MG/ML
0.5 CONCENTRATE ORAL EVERY 4 HOURS
Qty: 30 ML | Refills: 0 | Status: SHIPPED | OUTPATIENT
Start: 2021-01-01

## 2021-01-01 RX ORDER — POLYETHYLENE GLYCOL 3350 17 G/17G
17 POWDER, FOR SOLUTION ORAL DAILY PRN
DISCHARGE
Start: 2021-01-01 | End: 2021-01-01

## 2021-01-01 RX ORDER — MORPHINE SULFATE 10 MG/5ML
2.5 SOLUTION ORAL EVERY 6 HOURS PRN
Qty: 30 ML | Refills: 0 | Status: SHIPPED | OUTPATIENT
Start: 2021-01-01 | End: 2021-01-01

## 2021-01-01 RX ORDER — SALIVA STIMULANT COMB. NO.3
1 SPRAY, NON-AEROSOL (ML) MUCOUS MEMBRANE
Status: DISCONTINUED | OUTPATIENT
Start: 2021-01-01 | End: 2021-01-01 | Stop reason: HOSPADM

## 2021-01-01 RX ADMIN — AMITRIPTYLINE HYDROCHLORIDE 25 MG: 25 TABLET, FILM COATED ORAL at 20:57

## 2021-01-01 RX ADMIN — IPRATROPIUM BROMIDE AND ALBUTEROL SULFATE 3 ML: .5; 3 SOLUTION RESPIRATORY (INHALATION) at 08:02

## 2021-01-01 RX ADMIN — LISINOPRIL 30 MG: 10 TABLET ORAL at 10:19

## 2021-01-01 RX ADMIN — IOPAMIDOL 63 ML: 755 INJECTION, SOLUTION INTRAVENOUS at 08:51

## 2021-01-01 RX ADMIN — ALPRAZOLAM 0.25 MG: 0.25 TABLET ORAL at 22:05

## 2021-01-01 RX ADMIN — POTASSIUM CHLORIDE 40 MEQ: 1500 TABLET, EXTENDED RELEASE ORAL at 09:04

## 2021-01-01 RX ADMIN — ALPRAZOLAM 0.25 MG: 0.25 TABLET ORAL at 20:57

## 2021-01-01 RX ADMIN — ALPRAZOLAM 0.25 MG: 0.25 TABLET ORAL at 10:29

## 2021-01-01 RX ADMIN — AZITHROMYCIN MONOHYDRATE 500 MG: 500 INJECTION, POWDER, LYOPHILIZED, FOR SOLUTION INTRAVENOUS at 20:36

## 2021-01-01 RX ADMIN — CALCIUM CARBONATE (ANTACID) CHEW TAB 500 MG 500 MG: 500 CHEW TAB at 10:04

## 2021-01-01 RX ADMIN — ALBUTEROL SULFATE 2.5 MG: 2.5 SOLUTION RESPIRATORY (INHALATION) at 10:24

## 2021-01-01 RX ADMIN — Medication 12.5 MG: at 00:40

## 2021-01-01 RX ADMIN — LISINOPRIL 30 MG: 10 TABLET ORAL at 14:25

## 2021-01-01 RX ADMIN — ALPRAZOLAM 0.25 MG: 0.25 TABLET ORAL at 10:04

## 2021-01-01 RX ADMIN — POTASSIUM CHLORIDE 10 MEQ: 1.5 SOLUTION ORAL at 10:04

## 2021-01-01 RX ADMIN — OLANZAPINE 5 MG: 10 INJECTION, POWDER, LYOPHILIZED, FOR SOLUTION INTRAMUSCULAR at 05:53

## 2021-01-01 RX ADMIN — AMITRIPTYLINE HYDROCHLORIDE 25 MG: 25 TABLET, FILM COATED ORAL at 14:25

## 2021-01-01 RX ADMIN — SODIUM CHLORIDE 1000 ML: 9 INJECTION, SOLUTION INTRAVENOUS at 22:00

## 2021-01-01 RX ADMIN — AMITRIPTYLINE HYDROCHLORIDE 25 MG: 25 TABLET, FILM COATED ORAL at 20:23

## 2021-01-01 RX ADMIN — POTASSIUM CHLORIDE 10 MEQ: 7.46 INJECTION, SOLUTION INTRAVENOUS at 09:07

## 2021-01-01 RX ADMIN — AMITRIPTYLINE HYDROCHLORIDE 25 MG: 25 TABLET, FILM COATED ORAL at 14:27

## 2021-01-01 RX ADMIN — POTASSIUM CHLORIDE 40 MEQ: 1500 TABLET, EXTENDED RELEASE ORAL at 14:25

## 2021-01-01 RX ADMIN — Medication 1 PATCH: at 08:49

## 2021-01-01 RX ADMIN — POTASSIUM CHLORIDE 10 MEQ: 750 TABLET, EXTENDED RELEASE ORAL at 14:27

## 2021-01-01 RX ADMIN — CLONIDINE HYDROCHLORIDE 0.1 MG: 0.1 TABLET ORAL at 14:25

## 2021-01-01 RX ADMIN — ONDANSETRON 4 MG: 2 INJECTION INTRAMUSCULAR; INTRAVENOUS at 07:04

## 2021-01-01 RX ADMIN — ACETAMINOPHEN 650 MG: 650 SUPPOSITORY RECTAL at 21:02

## 2021-01-01 RX ADMIN — ENOXAPARIN SODIUM 40 MG: 40 INJECTION SUBCUTANEOUS at 08:51

## 2021-01-01 RX ADMIN — ONDANSETRON 4 MG: 2 INJECTION INTRAMUSCULAR; INTRAVENOUS at 01:59

## 2021-01-01 RX ADMIN — SODIUM CHLORIDE 70 ML: 9 INJECTION, SOLUTION INTRAVENOUS at 08:51

## 2021-01-01 ASSESSMENT — ACTIVITIES OF DAILY LIVING (ADL)
ADLS_ACUITY_SCORE: 19
ADLS_ACUITY_SCORE: 18
ADLS_ACUITY_SCORE: 19
ADLS_ACUITY_SCORE: 18
ADLS_ACUITY_SCORE: 18
ADLS_ACUITY_SCORE: 17
ADLS_ACUITY_SCORE: 19
ADLS_ACUITY_SCORE: 17
ADLS_ACUITY_SCORE: 19
ADLS_ACUITY_SCORE: 19
ADLS_ACUITY_SCORE: 18
ADLS_ACUITY_SCORE: 17

## 2021-01-01 ASSESSMENT — MIFFLIN-ST. JEOR
SCORE: 798.32
SCORE: 813.74
SCORE: 781.08
SCORE: 818.75
SCORE: 771.1

## 2021-01-01 ASSESSMENT — PATIENT HEALTH QUESTIONNAIRE - PHQ9: SUM OF ALL RESPONSES TO PHQ QUESTIONS 1-9: 21

## 2021-01-05 NOTE — PROGRESS NOTES
Assessment & Plan     ICD-10-CM    1. Acute sinusitis with symptoms > 10 days  J01.90 cephALEXin (KEFLEX) 500 MG capsule   2. Cough  R05 XR Chest 2 Views     Symptomatic COVID-19 Virus (Coronavirus) by PCR     Symptomatic COVID-19 Virus (Coronavirus) by PCR   3. Smoker  F17.200 XR Chest 2 Views   4. Mild major depression (H)  F32.0 amitriptyline (ELAVIL) 25 MG tablet   5. Psychophysiological insomnia  F51.04 amitriptyline (ELAVIL) 25 MG tablet   6. Anxiety  F41.9 amitriptyline (ELAVIL) 25 MG tablet   7. Hypertension goal BP (blood pressure) < 140/80  I10 cloNIDine (CATAPRES) 0.1 MG tablet     lisinopril (ZESTRIL) 30 MG tablet   8. Screening for hyperlipidemia  Z13.220      - Patient presents with her niece Sinai who assisted in HPI and was present during entirety of visit today     1-3. Cough and sinus pressure  - Seems consistent with sinus infection but patient's daughter was around her when possibly contagious with COVID  - Recommend CXR due to lung findings and COVID swab   - CXR was clear, await covid swab  - Will treat for sinus infection   - Recommend Keflex   - Discussed antibiotic use, duration, and side effects  - Discussed air trapping on exam, likely has COPD as well but can't get spirometry and would want to wait until not ill   - Again encouraged smoking cessation, patient declined   - Recommend lung cancer screening but again declined     4-6. Mood and sleep   - Still struggling and has days where anxiety is out of control (per niece, will call her or daughter 7-10x)   - Recommend tiny increase in Amitriptyline from 1 tablet and 1/2 tablet to 1 and 1   - She refers to medication in colors, reviewed the following plan with her and niece Sinai     - AM -  Amitriptyline (red pill)  & 1 Xanax     Lunch - 1 Amitriptyline (red pill) & 1/2 Xanax    Supper - 1 Clonidine & 1 Lisinopril 30 mg     Bedtime - 1 Xanax     If can't sleep - can take other 1/2 tablet Xanax (for total 3/day)    7. HTN   - Elevated  at times but likely due to anxiety, as we discussed previously, recommend checking BP only once per day as tends to just worry her more   - Stable here today   - Reviewed medications use and side effects, refilled     8. Patient declined any labs today     Review of the result(s) of each unique test - CXR, labs from 10/11/20 & 11/12/19  Assessment requiring an independent historian(s) - family - Sinai (niece)- assisted in GAD7, PHQ9, and HPI  Diagnosis or treatment significantly limited by social determinants of health - Uncontrolled anxiety, age, patient very resistant to any treatment or medication, smoking    40 minutes spent on the date of the encounter doing chart review, history and exam, documentation and further activities as noted above     Tobacco Cessation:   reports that she has been smoking cigarettes. She has been smoking about 1.00 pack per day. She has never used smokeless tobacco.  Tobacco Cessation Action Plan: Information offered: Patient not interested at this time      The patient indicates understanding of these issues and agrees with the plan.    Return in about 1 month (around 2/5/2021).    TELMA Galdamez [facility]     Todd Clifford is a 86 year old who presents to clinic today for the following health issues  accompanied by her niece:    HPI       Hypertension Follow-up      Do you check your blood pressure regularly outside of the clinic? Not regularly her niece checks it when she comes to visit    Are you following a low salt diet? No    Are your blood pressures ever more than 140 on the top number (systolic) OR more   than 90 on the bottom number (diastolic), for example 140/90? Yes    Anxiety Follow-Up    How are you doing with your anxiety since your last visit? Worsened     Are you having other symptoms that might be associated with anxiety? Yes:  concerned about her brother     Have you had a significant life event?  Relationship Concerns her brother is in nursing home     Are you feeling depressed? Yes:  because of brothers situation    Do you have any concerns with your use of alcohol or other drugs? No      Sinus issues   - 1 week   - Sinus pressure, headache, loss of taste and smell   - Raspy voice   - Sore throat   - No cough or fever   - Ears hurt   - Daughter had COVID     Social History     Tobacco Use     Smoking status: Current Every Day Smoker     Packs/day: 1.00     Types: Cigarettes     Smokeless tobacco: Never Used   Substance Use Topics     Alcohol use: No     Drug use: No     KIRILL-7 SCORE 11/2/2018 6/20/2019 11/12/2019   Total Score - - -   Total Score 11 (moderate anxiety) 7 (mild anxiety) -   Total Score 11 7 18     PHQ 11/2/2018 6/20/2019 11/12/2019   PHQ-9 Total Score 8 9 15   Q9: Thoughts of better off dead/self-harm past 2 weeks Not at all Not at all Not at all     Last PHQ-9 11/12/2019   1.  Little interest or pleasure in doing things 3   2.  Feeling down, depressed, or hopeless 0   3.  Trouble falling or staying asleep, or sleeping too much 3   4.  Feeling tired or having little energy 3   5.  Poor appetite or overeating 0   6.  Feeling bad about yourself 3   7.  Trouble concentrating 3   8.  Moving slowly or restless 0   Q9: Thoughts of better off dead/self-harm past 2 weeks 0   PHQ-9 Total Score 15   Difficulty at work, home, or with people Extremely dIfficult     KIRILL-7  11/12/2019   1. Feeling nervous, anxious, or on edge 3   2. Not being able to stop or control worrying 3   3. Worrying too much about different things 3   4. Trouble relaxing 3   5. Being so restless that it is hard to sit still 0   6. Becoming easily annoyed or irritable 3   7. Feeling afraid, as if something awful might happen 3   KIRILL-7 Total Score 18   If you checked any problems, how difficult have they made it for you to do your work, take care of things at home, or get along with other people? Extremely difficult         How many  servings of fruits and vegetables do you eat daily?  0-1    On average, how many sweetened beverages do you drink each day (Examples: soda, juice, sweet tea, etc.  Do NOT count diet or artificially sweetened beverages)?   0    How many days per week do you exercise enough to make your heart beat faster? 3 or less    How many minutes a day do you exercise enough to make your heart beat faster? 9 or less    How many days per week do you miss taking your medication? 0    Review of Systems   Constitutional, HEENT, cardiovascular, pulmonary, GI, , musculoskeletal, neuro, skin, endocrine and psych systems are negative, except as otherwise noted.      Objective    /72   Pulse 108   Temp 98  F (36.7  C) (Temporal)   Wt 48.1 kg (106 lb)   SpO2 94%   BMI 21.41 kg/m    Body mass index is 21.41 kg/m .  Physical Exam   GENERAL APPEARANCE: mildly ill appearing, alert and no distress  EYES: Eyes grossly normal to inspection, PERRLA, conjunctivae and sclerae without injection or discharge, EOM intact   HENT: Bilateral ear canals without erythema or cerumen, bilateral TM's pearly grey with normal light reflex, no effusion, injection, or bulging, nasal turbinates with swelling and erythema, yellow-green nasal discharge, mouth without ulcers or lesions, oropharynx clear and oral mucous membranes moist, bilateral maxillary and frontal sinus tenderness   NECK: No adenopathy in cervical or supraclavicular regions  RESP: Diffuse air trapping with bilateral lower lobe rhonchi, no rales or wheezes   CV: Regular rates and rhythm, normal S1 S2, no S3 or S4, no murmur, click or rub  MS: No musculoskeletal defects are noted and gait is age appropriate without ataxia   SKIN: No suspicious lesions or rashes, hydration status appears adeuqate with normal skin turgor   PSYCH: Alert and oriented x3; speech- coherent , normal rate and volume; able to articulate logical thoughts, able to abstract reason, no tangential thoughts, no  hallucinations or delusions, mentation appears normal, Mood is euthymic. Affect is appropriate for this mood state and bright. Thought content is free of suicidal ideation, hallucinations, and delusions. Dress is adequate and upkept. Eye contact is good during conversation.       Diagnostics   CXR: Normal- no infiltrates, effusions, pneumothoraces, cardiomegaly or masses  awaiting formal interpretation from Radiologist at this time    COVID-19 swab - pending

## 2021-01-05 NOTE — PATIENT INSTRUCTIONS
- Take 1 red tablet in the morning and 1 red tablet in the afternoon     - Keflex (antibiotic) - 1 tablet in the morning and one with supper for 10 days     - AM -  Amitriptyline (red pill)  & 1 Xanax     Lunch - 1 Amitriptyline (red pill) & 1/2 Xanax    Supper - 1 Clonidine & 1 Lisinopril 30 mg     Bedtime - 1 Xanax     If can't sleep - can take other 1/2 tablet Xanax (for total 3/day)

## 2021-01-07 NOTE — RESULT ENCOUNTER NOTE
Please call patient with the following message    COVID test was negative     Eduard Carrington PA-C

## 2021-01-15 NOTE — TELEPHONE ENCOUNTER
Pending Prescriptions:                       Disp   Refills    ALPRAZolam (XANAX) 0.25 MG tablet [Pharmac*90 tab*0        Sig: TAKE 1/2 TO 1 (ONE-HALF TO ONE) TABLET BY MOUTH THREE           TIMES DAILY AS NEEDED FOR ANXIETY . DO NOT EXCEED           3 PER 24 HOURS    Last Written Prescription Date:  12/18/20  Last Fill Quantity: 90,  # refills: 0   Last office visit: 1/5/2021 with prescribing provider:  Yoli   Future Office Visit:      Routing refill request to provider for review/approval because:  Drug not on the [facility] refill protocol     Melida Newby RN on 1/15/2021 at 9:46 AM

## 2021-02-15 NOTE — TELEPHONE ENCOUNTER
Prior Authorization Retail Medication Request    Medication/Dose: ALPRAZolam (XANAX) 0.25 MG tablet  ICD code (if different than what is on RX):  Anxiety [F41.9]   Previously Tried and Failed:  na  Rationale:  na    Insurance Name:  UCARE - UCARE MEDICARE NON [employer] ([employer] [facility])  Insurance ID:  [id number]      Pharmacy Information (if different than what is on RX)  Name:  [facility]  Phone:  [phone]

## 2021-02-18 NOTE — TELEPHONE ENCOUNTER
Prior Authorization Approval    Authorization Effective Date: 1/19/2021  Authorization Expiration Date: 2/18/2022  Medication: ALPRAZolam (XANAX) 0.25 MG-APPROVED  Approved Dose/Quantity:    Reference #:     Insurance Company: [employer] - Phone [phone] Fax [phone]  Expected CoPay:       CoPay Card Available:      Foundation Assistance Needed:    Which Pharmacy is filling the prescription (Not needed for infusion/clinic administered): [facility] [address] [place] [id number] [facility]  Pharmacy Notified: Yes  Patient Notified: Yes  **Instructed pharmacy to notify patient when script is ready to /ship.**

## 2021-02-18 NOTE — TELEPHONE ENCOUNTER
Central Prior Authorization Team   Phone: [phone]    PA Initiation    Medication: ALPRAZolam (XANAX) 0.25 MG tablet  Insurance Company: [employer] - Phone [phone] Fax [phone]  Pharmacy Filling the Rx: [facility] [address] [place] [id number] [facility]  Filling Pharmacy Phone: [phone]  Filling Pharmacy Fax: [phone]  Start Date: 2/18/2021

## 2021-02-28 PROBLEM — E87.6 HYPOKALEMIA: Status: ACTIVE | Noted: 2021-01-01

## 2021-02-28 PROBLEM — J96.21 ACUTE AND CHRONIC RESPIRATORY FAILURE WITH HYPOXIA (H): Status: RESOLVED | Noted: 2021-01-01 | Resolved: 2021-01-01

## 2021-02-28 PROBLEM — R06.03 RESPIRATORY DISTRESS: Status: RESOLVED | Noted: 2021-01-01 | Resolved: 2021-01-01

## 2021-02-28 PROBLEM — M62.81 GENERALIZED MUSCLE WEAKNESS: Status: ACTIVE | Noted: 2021-01-01

## 2021-02-28 PROBLEM — R06.02 SHORTNESS OF BREATH: Status: ACTIVE | Noted: 2021-01-01

## 2021-02-28 PROBLEM — E87.3 COMPENSATED METABOLIC ALKALOSIS: Status: ACTIVE | Noted: 2021-01-01

## 2021-02-28 PROBLEM — M48.50XA COMPRESSION FRACTURE OF SPINE (H): Status: ACTIVE | Noted: 2017-07-17

## 2021-02-28 PROBLEM — R06.03 RESPIRATORY DISTRESS: Status: ACTIVE | Noted: 2021-01-01

## 2021-02-28 PROBLEM — R00.0 SINUS TACHYCARDIA: Status: ACTIVE | Noted: 2021-01-01

## 2021-02-28 PROBLEM — J96.21 ACUTE AND CHRONIC RESPIRATORY FAILURE WITH HYPOXIA (H): Status: ACTIVE | Noted: 2021-01-01

## 2021-02-28 NOTE — ED NOTES
ED Nursing criteria listed below was addressed during verbal handoff:     Abnormal vitals: Yes  Abnormal results: Yes  Med Reconciliation completed: Yes  Meds given in ED: Yes potassium replacement  Any Overdue Meds: No  Core Measures: N/A  Isolation: No  Special needs: Yes fall risk  Skin assessment: Yes    Observation Patient  Education provided: Yes

## 2021-02-28 NOTE — PLAN OF CARE
S-(situation): Patient registered to Observation. Patient arrived to room 270 via wheelchair from ED    B-(background): SOB, COPD    A-(assessment): alert oriented, lung sounds clear/diminished with intermittent coarseness which resolves with coughing, low potassium, elevated BNP, elevated respiratory rate    R-(recommendations): Orders and observation goals reviewed with patient    Nursing Observation criteria listed below was met:    Skin issues/needs documented:NA  Isolation needs addressed and Signage up: NA  Fall Prevention: Education given and documented: Yes  Education Assessment documented:Yes  Education Documented: Yes  OBS video/handout Reviewed & Documented: Yes  Allergies Reviewed: Yes  Medication Reconciliation Complete: Yes  New medication patient education completed and documented (Possible Side Effects of Common Medications handout): Yes  Home medications if not able to send immediately home with family stored here: NA  Reminder note placed in discharge instructions: NA  Patient has discharge needs (If yes, please explain): Yes PT/OT to evaluate,  to assist

## 2021-02-28 NOTE — H&P
[facility]    History and Physical - Hospitalist Service       Date of Admission:  2/28/2021    Assessment & Plan   Todd Clifford is a 86 year old female with a past medical history of hypertension, severe anxiety, and many year history of heavy smoking who presented to the hospital with concerns of worsening shortness of breath that has been progressively occurring over the past month but severely worsened upon awakening this morning.  She was brought into the emergency department via EMS and was found to be 86 to 88% on room air with respiratory rates in the 30s and patient was started on oxygen supplementation.  No fever, chills, cough, chest pain, nausea, vomiting, diarrhea that patient is aware of.  No contact with anyone infected with Covid and she has not been around anyone who has been ill recently.  CTA of the chest shows no PE or other significant findings.  On work-up patient has found to have either a metabolic alkalosis versus a compensated respiratory acidosis with acute respiratory uncompensated alkalosis.  She is also been found to have hypokalemia.  In discussion with patient and her family there is also significant concern regarding her safety in returning home due to worsening weakness and shortness of breath.  She had a niece that was involved daily who unfortunately passed away recently and patient has been struggling since that time.  There is also some concern regarding patient's cognitive status given this initial interview and patient's response to more pointed questions.  Decision made to register patient to observation status to perform further evaluation, potassium correction, and safe disposition assessment.    Principal Problem:    Shortness of breath    Assessment: Unclear etiology without any obvious source being present.  If patient does have a compensated metabolic alkalosis the shortness of breath likely is allowing for the compensation to occur.   If there is a chronic compensated respiratory failure with acute respiratory alkalosis it is possible that anxiety is playing a large role, as patient does seem significantly anxious.  Patient does have ongoing shortness of breath despite O2 supplementation in the emergency room    Plan: We will register to observation status, provide oxygen to maintain O2 sats of 86 to 94% given patient's increased risk for hypercapnia development with high oxygen supplementation, perform ABG now to further assist in evaluation of acid-base status and perform serial VBG's overnight.  Will have PT assess patient regarding safe disposition plan.  Perform echocardiogram tomorrow given elevated BNP and progressive worsening of symptoms    Active Problems:    Compensated metabolic alkalosis vs chronic compensated respiratory failure with acute respiratory alkalosis    Assessment: Patient has acid-base changes of unclear etiology as above    Plan: Proceed with potassium supplementation, ABG x1 now, serial VBG's to further assist in evaluation and monitor patient closely      Hypokalemia    Assessment: Potassium of 2.8.  In discussion with patient's daughter this likely secondary to nutritional deficiency    Plan: We will supplement with high replacement protocol, have nutritionist evaluation to further assist and nutritional status.  Perform magnesium and phosphorus testing to see if other nutritional deficits are present      Sinus tachycardia    Assessment: Heart rate mildly elevated in the 100-110 range of unclear significance    Plan: We will proceed with plan as above and monitor heart rate closely      Generalized muscle weakness    Assessment: Not felt to be present by the patient but is a concern of the patient's daughter as she has noted significant decline in the past few months    Plan: We will have PT evaluation.  Will perform OT cognitive evaluation      KIRILL (generalized anxiety disorder);   Mild major depression (H)     Assessment: On amitriptyline and as needed Xanax    Plan: Continue home regimen without change      Hypertension goal BP (blood pressure) < 140/80    Assessment: On clonidine and lisinopril.  Blood pressures are hypertensive however patient has not taken her home regimen today    Plan: We will restart home regimen and monitor closely      Multiple compression fracture of spine (H)    Assessment: Patient has numerous chronic fractures which does impair her activities of daily living at baseline    Plan: We will have PT and OT evaluation as above       Diet:   regular diet  DVT Prophylaxis: observation status  Cruz Catheter: not present  Code Status:   DNR/DNI         Disposition Plan   Expected discharge: 1-2 days, recommended to unknown location once further work up complete, safe disposition identified and found.  Entered: Elicia Haider MD 02/28/2021, 11:15 AM     The patient's care was discussed with the Patient and Patient's Family.    Elicia Haider MD  [facility]  Contact information available via [facility] Paging/Directory      ______________________________________________________________________    Chief Complaint   Worsening shortness of breath x1 months, significant worsening this morning    History is obtained from the patient and her daughter    History of Present Illness   Todd Clifford is a 86 year old female who presents with 1 month history of worsening shortness of breath with acute severe worsening upon waking this morning for which she presented to the emergency room.  She continues to smoke approximately 2 packs of cigarettes a day but becomes very resistant when COPD or lung damage as discussed.  She denies any other symptoms and denies she is having any difficulty at home, but in the next breath will report that she is thinking about selling her house in order to move into a nursing home so they can take care of her etc. having herself  struggle at home.    Review of Systems    CONSTITUTIONAL: NEGATIVE for fever, chills, change in weight  INTEGUMENTARY/SKIN: NEGATIVE for worrisome rashes, moles or lesions  EYES: NEGATIVE for vision changes or irritation  RESP: positive for worsening shortness of breath worsening over the past month, no cough, no wheezing  CV: NEGATIVE for chest pain, palpitations or peripheral edema  GI: NEGATIVE for nausea, abdominal pain, heartburn, or change in bowel habits  : NEGATIVE for frequency, dysuria, or hematuria  MUSCULOSKELETAL:positive for generalized weakness per daughter - patient currently denies the weakness is an issue  NEURO: NEGATIVE for weakness, dizziness or paresthesias  HEME: NEGATIVE for bleeding problems  PSYCHIATRIC: NEGATIVE for changes in mood or affect    Past Medical History    I have reviewed this patient's medical history and updated it with pertinent information if needed.   Past Medical History:   Diagnosis Date     Anxiety      Hearing loss      Hypertension        Past Surgical History   I have reviewed this patient's surgical history and updated it with pertinent information if needed.  No past surgical history on file.    Social History   I have reviewed this patient's social history and updated it with pertinent information if needed.  Social History     Tobacco Use     Smoking status: Current Every Day Smoker     Packs/day: 1.00     Types: Cigarettes     Smokeless tobacco: Never Used   Substance Use Topics     Alcohol use: No     Drug use: No       Family History   I have reviewed this patient's family history and updated it with pertinent information if needed.  Family History   Problem Relation Age of Onset     C.A.D. Mother      Hypertension Mother      C.A.D. Father      Hypertension Father      Hypertension Brother      Diabetes Daughter        Prior to Admission Medications   Prior to Admission Medications   Prescriptions Last Dose Informant Patient Reported? Taking?   ALPRAZolam  "(XANAX) 0.25 MG tablet 2/27/2021 at 2000  No Yes   Sig: TAKE 1/2 TO 1 (ONE-HALF TO ONE) TABLET BY MOUTH THREE TIMES DAILY AS NEEDED FOR ANXIETY . DO NOT EXCEED 3 PER 24 HOURS   amitriptyline (ELAVIL) 25 MG tablet 2/27/2021 at 1400  No Yes   Sig: Take 1 tablet (25 mg) by mouth 3 times daily   cloNIDine (CATAPRES) 0.1 MG tablet 2/27/2021 at 0800  No Yes   Sig: Take 1 tablet (0.1 mg) by mouth daily   lisinopril (ZESTRIL) 30 MG tablet 2/27/2021 at 1800  No Yes   Sig: Take 1 tablet (30 mg) by mouth daily      Facility-Administered Medications: None     Allergies   Allergies   Allergen Reactions     Prozac [Fluoxetine]      \"roaring in my head\"   nervous       Physical Exam   Vital Signs: Temp: 98.4  F (36.9  C) Temp src: Oral BP: (!) 162/88 Pulse: 101   Resp: 30 SpO2: 96 % O2 Device: Nasal cannula Oxygen Delivery: 2 LPM  Weight: 113 lbs 0 oz    Constitutional: awake, alert, cooperative, patient has ongoing tachypnea, and appears stated age  Eyes: Lids and lashes normal, pupils equal, round and reactive to light, extra ocular muscles intact, sclera clear, conjunctiva normal  ENT: Head atraumatic. TM are clear and non-erythematous bilaterally.  Nares normal bilaterally.  Throat is non-erythematous and non-inflamed.    Respiratory: ongoing tachypnea in the upper 20s currently, no use of accessory muscles, decreased breath sounds diffusely without wheezing or crackles  Cardiovascular: sinus tachycardia - heart sounds are distance but no obvious murmur noted  GI: bowel sounds present, abdomen soft and non-tender   Skin: no redness, warmth, or swelling and no rashes  Musculoskeletal: no lower extremity pitting edema present  Neurologic: awake, alert, oriented to person, place, year but not month, overall to situation.  It is noted that patient is very vague about many details and often changes her mind and doesn't remember earlier points in the conversation.      Data   Data reviewed today: I reviewed all medications, new labs " and imaging results over the last 24 hours. I personally reviewed EKG - sinus tachycardia present.    Recent Labs   Lab 02/28/21  1316 02/28/21  0751   WBC  --  6.4   HGB  --  12.0   MCV  --  99   PLT  --  229   NA  --  138   POTASSIUM 3.0* 2.8*   CHLORIDE  --  95   CO2  --  35*   BUN  --  19   CR  --  0.69   ANIONGAP  --  8   PATRICIA  --  9.3   GLC  --  103*   ALBUMIN  --  3.4   PROTTOTAL  --  7.2   BILITOTAL  --  0.6   ALKPHOS  --  109   ALT  --  30   AST  --  21   TROPI  --  0.018     Recent Results (from the past 24 hour(s))   CT Chest Pulmonary Embolism w Contrast    Narrative    CT CHEST PULMONARY EMBOLISM WITH CONTRAST  2/28/2021 9:05 AM    CLINICAL HISTORY: Hypertension. Dyspnea. Known compression fractures  of the spine.    TECHNIQUE: CT angiogram chest during arterial phase injection IV  contrast. 2D and 3D MIP reconstructions were performed by the CT  technologist. Dose reduction techniques were used.     CONTRAST: 63 mL [id number]-[id number]    COMPARISON: None.    FINDINGS:  ANGIOGRAM CHEST: Pulmonary arteries are normal caliber and negative  for pulmonary emboli. Thoracic aorta is negative for dissection. No CT  evidence of right heart strain.    LUNGS AND PLEURA: Centrilobular emphysema is noted. Anterior left  upper lobe calcified granuloma is noted on image 78 of series 6. A 0.3  cm indeterminate left lower lobe lung nodule noted on image 78 of  series 6. Right lower lobe mucoid impaction is noted on image 76 along  with mild bronchiectatic changes. No surrounding inflammation. No  pleural effusions.    MEDIASTINUM/AXILLAE: The heart is normal in size. No pericardial  fluid. Calcified plaque coronary arteries and thoracic aorta are  noted. No evidence of aortic aneurysm or dissection. No enlarged lymph  nodes. The esophagus is unremarkable.    UPPER ABDOMEN: Probable left adrenal adenoma on image 130 of series 5  measures 2.3 cm. No prior imaging available for comparison. This  appears fairly low density and is  therefore considered benign.  Calcified granulomas are noted in the spleen.    MUSCULOSKELETAL: Multiple mid to lower thoracic and upper lumbar  vertebral body compression fractures are present. Thoracic kyphosis is  noted. No malalignment is appreciated. No destructive bone changes are  evident.      Impression    IMPRESSION:  1.  No evidence of pulmonary embolism. Thoracic aorta demonstrates  calcified plaque without aneurysm or dissection. Coronary artery  calcifications are also noted.    2.  Multilevel thoracic and upper lumbar spinal compression fractures.  No prior imaging available to assess for chronicity. No obvious  malalignment. Kyphotic deformity is noted.    3.  Indeterminate 2.3 cm left adrenal nodule. This appears fairly  low-density and is likely a benign adenoma.  No further follow-up  suggested.    ADRIAN VELA MD

## 2021-02-28 NOTE — ED PROVIDER NOTES
"  History     Chief Complaint   Patient presents with     Shortness of Breath     HPI  Todd Clifford is a 86 year old female who presents to the emergency department via EMS with complaints of increasing shortness of breath.  Patient states that she has been feeling more short of breath over the past 1 month.  Apparently she woke up this morning and was feeling very short of breath and panicky, she then called her family.  Family then called EMS and brought the patient here.  She states that she is at the point where she can barely make it from her bed to the bathroom without being very winded.  She does live alone.  She denies any recent fevers or chills.  She does have a cough and she feels like her voice has slowly been going away over the past month.  If she coughs and clears her throat though her voice gets better but then gets bad again.  She is not sure if this is contributing to her breathing.  She denies any chest pain or nausea or vomiting.  Denies any dysuria or hematuria.  Patient does smoke cigarettes but has never been formally diagnosed with COPD.    Allergies:  Allergies   Allergen Reactions     Prozac [Fluoxetine]      \"roaring in my head\"   nervous       Problem List:    Patient Active Problem List    Diagnosis Date Noted     Respiratory distress 02/28/2021     Priority: Medium     Sinus tachycardia 02/28/2021     Priority: Medium     Shortness of breath 02/28/2021     Priority: Medium     Hypokalemia 02/28/2021     Priority: Medium     Generalized muscle weakness 02/28/2021     Priority: Medium     Acute and chronic respiratory failure with hypoxia (H) 02/28/2021     Priority: Medium     Tobacco use 11/12/2019     Priority: Medium     Compression fracture of lumbar vertebra, closed, initial encounter (H) 07/17/2017     Priority: Medium     Psychophysiological insomnia 09/29/2016     Priority: Medium     Chronic constipation 09/15/2016     Priority: Medium     Mild major depression (H) 04/03/2013 "     Priority: Medium     Hypertension goal BP (blood pressure) < 140/80 06/02/2011     Priority: Medium     KIRILL (generalized anxiety disorder)      Priority: Medium        Past Medical History:    Past Medical History:   Diagnosis Date     Anxiety      Hearing loss      Hypertension        Past Surgical History:    No past surgical history on file.    Family History:    Family History   Problem Relation Age of Onset     C.A.D. Mother      Hypertension Mother      C.A.D. Father      Hypertension Father      Hypertension Brother      Diabetes Daughter        Social History:  Marital Status:   [5]  Social History     Tobacco Use     Smoking status: Current Every Day Smoker     Packs/day: 1.00     Types: Cigarettes     Smokeless tobacco: Never Used   Substance Use Topics     Alcohol use: No     Drug use: No        Medications:    ALPRAZolam (XANAX) 0.25 MG tablet  amitriptyline (ELAVIL) 25 MG tablet  cloNIDine (CATAPRES) 0.1 MG tablet  lisinopril (ZESTRIL) 30 MG tablet          Review of Systems   All other systems reviewed and are negative.      Physical Exam   BP: (!) 166/101  Pulse: 109  Temp: 98.4  F (36.9  C)  Resp: 22  Weight: 51.3 kg (113 lb)  SpO2: (!) 89 %      Physical Exam  Vitals signs and nursing note reviewed.   Constitutional:       General: She is not in acute distress.     Appearance: She is well-developed. She is not diaphoretic.   HENT:      Head: Normocephalic and atraumatic.      Nose: Nose normal.      Mouth/Throat:      Pharynx: No oropharyngeal exudate.   Eyes:      Conjunctiva/sclera: Conjunctivae normal.   Neck:      Musculoskeletal: Normal range of motion and neck supple.   Cardiovascular:      Rate and Rhythm: Normal rate and regular rhythm.      Heart sounds: Normal heart sounds. No murmur. No friction rub.   Pulmonary:      Effort: Pulmonary effort is normal. No respiratory distress.      Breath sounds: Normal breath sounds. No stridor. No wheezing or rales.   Abdominal:       General: Bowel sounds are normal. There is no distension.      Palpations: Abdomen is soft. There is no mass.      Tenderness: There is no abdominal tenderness. There is no guarding.   Musculoskeletal: Normal range of motion.         General: No tenderness.   Skin:     General: Skin is warm and dry.      Capillary Refill: Capillary refill takes less than 2 seconds.      Findings: No erythema.   Neurological:      Mental Status: She is alert and oriented to person, place, and time.   Psychiatric:         Judgment: Judgment normal.         ED Course        Procedures         EKG Interpretation:      Interpreted by Arley Arredondo  Time reviewed: now   Symptoms at time of EKG: now   Rhythm: normal sinus   Rate: normal  Axis: NORMAL  Ectopy: none  Conduction: normal  ST Segments/ T Waves: No ST-T wave changes  Q Waves: none  Comparison to prior: No old EKG available    Clinical Impression: normal EKG      Results for orders placed or performed during the hospital encounter of 02/28/21 (from the past 24 hour(s))   CBC with platelets differential   Result Value Ref Range    WBC 6.4 4.0 - 11.0 10e9/L    RBC Count 3.78 (L) 3.8 - 5.2 10e12/L    Hemoglobin 12.0 11.7 - 15.7 g/dL    Hematocrit 37.3 35.0 - 47.0 %    MCV 99 78 - 100 fl    MCH 31.7 26.5 - 33.0 pg    MCHC 32.2 31.5 - 36.5 g/dL    RDW 13.2 10.0 - 15.0 %    Platelet Count 229 150 - 450 10e9/L    Diff Method Automated Method     % Neutrophils 72.5 %    % Lymphocytes 11.0 %    % Monocytes 13.5 %    % Eosinophils 1.7 %    % Basophils 0.8 %    % Immature Granulocytes 0.5 %    Nucleated RBCs 0 0 /100    Absolute Neutrophil 4.6 1.6 - 8.3 10e9/L    Absolute Lymphocytes 0.7 (L) 0.8 - 5.3 10e9/L    Absolute Monocytes 0.9 0.0 - 1.3 10e9/L    Absolute Basophils 0.1 0.0 - 0.2 10e9/L    Abs Immature Granulocytes 0.0 0 - 0.4 10e9/L    Absolute Nucleated RBC 0.0    D dimer quantitative   Result Value Ref Range    D Dimer 0.8 (H) 0.0 - 0.50 ug/ml FEU   Comprehensive metabolic  panel   Result Value Ref Range    Sodium 138 133 - 144 mmol/L    Potassium 2.8 (L) 3.4 - 5.3 mmol/L    Chloride 95 94 - 109 mmol/L    Carbon Dioxide 35 (H) 20 - 32 mmol/L    Anion Gap 8 3 - 14 mmol/L    Glucose 103 (H) 70 - 99 mg/dL    Urea Nitrogen 19 7 - 30 mg/dL    Creatinine 0.69 0.52 - 1.04 mg/dL    GFR Estimate 79 >60 mL/min/[1.73_m2]    GFR Estimate If Black >90 >60 mL/min/[1.73_m2]    Calcium 9.3 8.5 - 10.1 mg/dL    Bilirubin Total 0.6 0.2 - 1.3 mg/dL    Albumin 3.4 3.4 - 5.0 g/dL    Protein Total 7.2 6.8 - 8.8 g/dL    Alkaline Phosphatase 109 40 - 150 U/L    ALT 30 0 - 50 U/L    AST 21 0 - 45 U/L   Troponin I   Result Value Ref Range    Troponin I ES 0.018 0.000 - 0.045 ug/L   Blood gas venous   Result Value Ref Range    Ph Venous 7.44 (H) 7.32 - 7.43 pH    PCO2 Venous 55 (H) 40 - 50 mm Hg    PO2 Venous 38 25 - 47 mm Hg    Bicarbonate Venous 38 (H) 21 - 28 mmol/L    Base Excess Venous 11.3 mmol/L    FIO2 28    Nt probnp inpatient (BNP)   Result Value Ref Range    N-Terminal Pro BNP Inpatient 2,654 (H) 0 - 1,800 pg/mL   Symptomatic Influenza A/B & SARS-CoV2 (COVID-19) Virus PCR Multiplex    Specimen: Nasopharyngeal   Result Value Ref Range    Flu A/B & SARS-COV-2 PCR Source Nasopharyngeal     SARS-CoV-2 PCR Result NEGATIVE     Influenza A PCR Negative NEG^Negative    Influenza B PCR Negative NEG^Negative    Respiratory Syncytial Virus PCR (Note)     Flu A/B & SARS-CoV-2 PCR Comment (Note)    CT Chest Pulmonary Embolism w Contrast    Narrative    CT CHEST PULMONARY EMBOLISM WITH CONTRAST  2/28/2021 9:05 AM    CLINICAL HISTORY: Hypertension. Dyspnea. Known compression fractures  of the spine.    TECHNIQUE: CT angiogram chest during arterial phase injection IV  contrast. 2D and 3D MIP reconstructions were performed by the CT  technologist. Dose reduction techniques were used.     CONTRAST: 63 mL Isovue-370    COMPARISON: None.    FINDINGS:  ANGIOGRAM CHEST: Pulmonary arteries are normal caliber and  negative  for pulmonary emboli. Thoracic aorta is negative for dissection. No CT  evidence of right heart strain.    LUNGS AND PLEURA: Centrilobular emphysema is noted. Anterior left  upper lobe calcified granuloma is noted on image 78 of series 6. A 0.3  cm indeterminate left lower lobe lung nodule noted on image 78 of  series 6. Right lower lobe mucoid impaction is noted on image 76 along  with mild bronchiectatic changes. No surrounding inflammation. No  pleural effusions.    MEDIASTINUM/AXILLAE: The heart is normal in size. No pericardial  fluid. Calcified plaque coronary arteries and thoracic aorta are  noted. No evidence of aortic aneurysm or dissection. No enlarged lymph  nodes. The esophagus is unremarkable.    UPPER ABDOMEN: Probable left adrenal adenoma on image 130 of series 5  measures 2.3 cm. No prior imaging available for comparison. This  appears fairly low density and is therefore considered benign.  Calcified granulomas are noted in the spleen.    MUSCULOSKELETAL: Multiple mid to lower thoracic and upper lumbar  vertebral body compression fractures are present. Thoracic kyphosis is  noted. No malalignment is appreciated. No destructive bone changes are  evident.      Impression    IMPRESSION:  1.  No evidence of pulmonary embolism. Thoracic aorta demonstrates  calcified plaque without aneurysm or dissection. Coronary artery  calcifications are also noted.    2.  Multilevel thoracic and upper lumbar spinal compression fractures.  No prior imaging available to assess for chronicity. No obvious  malalignment. Kyphotic deformity is noted.    3.  Indeterminate 2.3 cm left adrenal nodule. This appears fairly  low-density and is likely a benign adenoma.  No further follow-up  suggested.    ADRIAN VELA MD       Medications   potassium chloride 10 mEq in 100 mL sterile water intermittent infusion (premix) (10 mEq Intravenous New Bag 2/28/21 0907)   ipratropium - albuterol 0.5 mg/2.5 mg/3 mL (DUONEB) neb  solution 3 mL (3 mLs Nebulization Given 2/28/21 0802)   potassium chloride ER (KLOR-CON M) CR tablet 40 mEq (40 mEq Oral Given 2/28/21 0904)   sodium chloride (PF) 0.9% PF flush 3 mL (3 mLs Intracatheter Given 2/28/21 0852)   iopamidol (ISOVUE-370) solution 500 mL (63 mLs Intravenous Given 2/28/21 0851)   new 100 ml saline bag (70 mLs Intravenous Given 2/28/21 0851)     Labs are reviewed and were unremarkable except for a slightly elevated D-dimer and a slightly low potassium.  Patient's BNP was also slightly elevated and venous blood gas did show some mild CO2 retention..  I then proceeded to get a CT scan of her chest that did not show any PE or signs of infection or signs of failure.  When patient came in her initial sats did get down to around 87% on room air.  She is not normally on oxygen.  We placed her on 2 L of oxygen and got her back into the mid 90s.  We did try a neb here with did not seem to make a difference.  After her work-up, I did attempt to take the oxygen back off but her sats dipped back down to 87%.  Patient is fearful of going home and feels like she needs to be in a nursing home.  At this point we will plan on bringing the patient in for an observation stay.  We will work on continued to correct her potassium, will have physical therapy see the patient for safety eval and have  work with the patient about disposition planning.  Discussed the case with the hospitalist and will accept the patient.      Assessments & Plan (with Medical Decision Making)  Acute on chronic respiratory failure, hypokalemia     I have reviewed the nursing notes.    I have reviewed the findings, diagnosis, plan and need for follow up with the patient.          Final diagnoses:   Acute and chronic respiratory failure with hypoxia (H)   Hypokalemia       2/28/2021   [facility] EMERGENCY DEPT     Arley Arredondo MD  02/28/21 7363

## 2021-03-01 PROBLEM — G93.40 ACUTE ENCEPHALOPATHY: Status: ACTIVE | Noted: 2021-01-01

## 2021-03-01 PROBLEM — J96.21 ACUTE AND CHRONIC RESPIRATORY FAILURE WITH HYPOXIA (H): Status: ACTIVE | Noted: 2021-01-01

## 2021-03-01 PROBLEM — I50.32 CHRONIC DIASTOLIC HEART FAILURE (H): Status: ACTIVE | Noted: 2021-01-01

## 2021-03-01 PROBLEM — R41.89 COGNITIVE IMPAIRMENT: Status: ACTIVE | Noted: 2021-01-01

## 2021-03-01 PROBLEM — I27.20 PULMONARY HYPERTENSION (H): Status: ACTIVE | Noted: 2021-01-01

## 2021-03-01 PROBLEM — I07.1 TRICUSPID REGURGITATION: Status: ACTIVE | Noted: 2021-01-01

## 2021-03-01 PROBLEM — J96.01 ACUTE RESPIRATORY FAILURE WITH HYPOXIA (H): Status: ACTIVE | Noted: 2021-01-01

## 2021-03-01 NOTE — PROGRESS NOTES
03/01/21 1430   Quick Adds   Type of Visit Initial PT Evaluation       Present no   Living Environment   People in home alone   Current Living Arrangements house   Home Accessibility stairs to enter home;stairs within home   Number of Stairs, Main Entrance 3   Stair Railings, Main Entrance railings on both sides of stairs   Number of Stairs, Within Home, Primary other (see comments)   Stair Railings, Within Home, Primary   (does not have to access)   Transportation Anticipated agency   Living Environment Comments per daughter patient has been unable to leave her house for an extensive period of time due to inability to manage the stairs. sleeps on the sofa. step over tub shower   Self-Care   Usual Activity Tolerance poor   Current Activity Tolerance poor   Regular Exercise No   Equipment Currently Used at Home cane, straight;walker, rolling   Activity/Exercise/Self-Care Comment patient does not bathe and recieves assistance for these self cares. per daughter patient has been able to care for herself for a while.    Disability/Function   Hearing Difficulty or Deaf yes   Patient's preferred means of communication English speaker with hearing loss, no speech problems.   Describe hearing loss bilateral hearing loss   Use of hearing assistive devices bilateral hearing aids;amplifier   Were auxiliary aids offered? yes   The following aids were provided; pocket talker   Hearing Management hearing aides    Wear Glasses or Blind no   Concentrating, Remembering or Making Decisions Difficulty yes   Difficulty Communicating no   Difficulty Eating/Swallowing no   Walking or Climbing Stairs Difficulty no   Dressing/Bathing Difficulty no   Toileting issues no   Doing Errands Independently Difficulty (such as shopping) yes   Fall history within last six months no   General Information   Onset of Illness/Injury or Date of Surgery 02/28/21   Referring Physician Dr. Haider   Patient/Family Therapy Goals  Statement (PT) home   Pertinent History of Current Problem (include personal factors and/or comorbidities that impact the POC) Dilcia is a 86 year old female, admitted due to shortness of breath and found to have acute on chronic respiratory failure with hypoxia. Patient with a previous medical history of anxiety, HTN, depression, multiple spine fractures and tobacco use.   Existing Precautions/Restrictions fall   Weight-Bearing Status - LUE full weight-bearing   Weight-Bearing Status - RUE full weight-bearing   Weight-Bearing Status - LLE full weight-bearing   Weight-Bearing Status - RLE full weight-bearing   General Observations PT orders: eval and treat for discharge planning. Activity orders: up [facility] assistance.   Cognition   Orientation Status (Cognition) person   Affect/Mental Status (Cognition) anxious;agitated   Follows Commands (Cognition) 25-49% accuracy;follows one-step commands   Behavioral Issues uncooperative   Safety Deficit (Cognition) impulsivity;insight into deficits/self-awareness;moderate deficit   Cognitive Status Comments patient unable to attend to oxygen tubing and mobilize for transfers    Pain Assessment   Patient Currently in Pain Yes, see Vital Sign flowsheet  (back pain)   Posture    Posture Forward head position;Protracted shoulders;Kyphosis   Range of Motion (ROM)   ROM Comment bilat LE WFL   Strength   Manual Muscle Testing Quick Adds MMT: Ankle;MMT: Knee;MMT: Hip;MMT: Hand (General)   MMT: Hip, Rehab Eval   Hip Flexion - Left Side (3+/5) fair plus, left   Hip Extension - Left Side (3/5) fair, left   Hip ABduction - Left Side (3/5) fair, left   Hip ADduction - Left Side (3+/5) fair plus, left   Hip Flexion - Right Side (3+/5) fair plus, right   Hip Extension - Right Side (3/5) fair, right   Hip ABduction - Right Side (3/5) fair, right   Hip ADduction - Right Side (3+/5) fair plus, right   MMT: Knee, Rehab Eval   Knee Flexion - Left Side (3/5) fair, left   Knee Extension - Left Side  (3/5) fair, left   Knee Flexion - Right Side (3/5) fair, right   Knee Extension - Right Side (3/5) fair, right   MMT: Ankle, Rehab Eval   Ankle Dorsiflexion - Left Side (3/5) fair, left   Ankle Plantarflexion - Left Side (3/5) fair, left   Ankle Dorsiflexion - Right Side (3/5) fair, right   Ankle Plantarflexion - Right Side (3/5) fair, right   Bed Mobility   Bed Mobility rolling left;rolling right;scooting/bridging;supine-sit;sit-supine   Rolling Left [place] (Bed Mobility) independent   Rolling Right [place] (Bed Mobility) independent   Scooting/Bridging [place] (Bed Mobility) minimum assist (75% patient effort)   Supine-Sit [place] (Bed Mobility) modified independence   Sit-Supine [place] (Bed Mobility) independent   Bed Mobility Limitations impaired ability to control trunk for mobility  (back pain)   Impairments Contributing to Impaired Bed Mobility pain;decreased strength   Assistive Device (Bed Mobility) bed rails  (HOB elevated)   Transfers   Transfers bed-chair transfer;sit-stand transfer   Impairments Contributing to Impaired Transfers decreased strength;pain   Bed-Chair Transfer   Bed-Chair [place] (Transfers) contact guard   Assistive Device (Bed-Chair Transfers) walker, front-wheeled   Sit-Stand Transfer   Sit-Stand [place] (Transfers) contact guard   Assistive Device (Sit-Stand Transfers) walker, front-wheeled   Gait/Stairs (Locomotion)   [place] Level (Gait) minimum assist (75% patient effort);contact guard;verbal cues   Assistive Device (Gait) walker, front-wheeled   Distance in Feet (Required for LE Total Joints) 10'   Pattern (Gait) step-to   Deviations/Abnormal Patterns (Gait) stride length decreased;gait speed decreased;festinating/shuffling;base of support, narrow   Comment (Gait/Stairs) ambulation in room running into obstacles on left and right with walker. Unable to look forward for environment navigation. does not attend to oxygen line. Unable to follow  commands for directions and objective of mobilization. 1 minor LOB when turnning, MIN assist to recover   Balance   Balance Comments 1 minor LOB when turnning, MIN assist to recover. IND short sitting balance in straight back chair and at EOB   Sensory Examination   Sensory Perception patient reports no sensory changes   Coordination   Coordination Comments heel to shin assessment unable to follow commands to complete, provided visual cues and able to sequence only have with poor tracing and poor effort, flopping bilaterlly the tracing leg to the bed once the heel reached the ankle   Clinical Impression   Criteria for Skilled Therapeutic Intervention current level of function same as previous level of function;no significant expected improvement in functional status   PT Diagnosis (PT) deconditioned   Influenced by the following impairments chronic medical status   Functional limitations due to impairments high risk of falling   Clinical Presentation Stable/Uncomplicated   Clinical Presentation Rationale no change in status with mobilization   Clinical Decision Making (Complexity) low complexity   Therapy Frequency (PT) Evaluation only   Predicted Duration of Therapy Intervention (days/wks) 1   Anticipated Equipment Needs at Discharge (PT)   (walker from home)   Risk & Benefits of therapy have been explained evaluation/treatment results reviewed;care plan/treatment goals reviewed;risks/benefits reviewed;participants included;daughter   Clinical Impression Comments Patient presents with functional mobility at baseline per daughter. Patient unable to follow commands for strengthening and demonstrates no motivation to participate in skilled PT intervention. At this time patient without skilled inpatient PT needs identified. Should needs arise please place addtional inpatient PT evaluation order.   PT Discharge Planning    PT Discharge Recommendation (DC Rec) Long term care facility   PT Rationale for DC Rec moving at  baseline. no safety insight and no insight to her deficits. Lives alone in a home with stairs, unable to care for herself and does not have resources for 24/7 assistance in the home. She would benefit from LTC placement for safe disposition.   PT Brief overview of current status  IND bed mobility. SBA sit to/from stand. CGA ambulation 10' in room due to poor oxygen line management   Total Evaluation Time   Total Evaluation Time (Minutes) 20     Thank you for your referral.  Abbey Ray, PT, DPT, ATC    [facility]  O: [phone]  E: [email or web address]

## 2021-03-01 NOTE — PROGRESS NOTES
03/01/21 1000   Quick Adds   Type of Visit Initial Occupational Therapy Evaluation   Disability/Function   Hearing Difficulty or Deaf yes   Describe hearing loss bilateral hearing loss   The following aids were provided; pocket talker   General Information   Onset of Illness/Injury or Date of Surgery 02/28/21   Referring Physician Germán Garcia MD   Additional Occupational Profile Info/Pertinent History of Current Problem Patient is a 86 year old female with a past medical history of hypertension, severe anxiety, and many year history of heavy smoking who presented to the hospital with concerns of worsening shortness of breath that has been progressively occurring over the past month but severely worsened upon awakening this morning.  Patient currently on 1:1 due to increased behaviors last night. MD requesting cognitive evaluation for safety at home.    General Observations and Info Upon arrival patient was lying in bed just about to take a nap. 1:1 exited room during cog screen.    Cognitive Status Examination   Affect/Mental Status (Cognitive) anxious;agitated   Follows Commands 25-49% accuracy   Safety Deficit severe deficit   Memory Deficit severe deficit   Cognitive Status Comments SLUMS 15/30 dementia rating. Pt was agitated towards end of eval. Pt reported that she started feeling nauseous once the cog eval started; possible maladaptive behavior to get out of completing assessment. Patient had difficulties with memory recall, following directions, and ideation.  OT concerned with safety for higher level cognitive task.    Clinical Impression   Criteria for Skilled Therapeutic Interventions Met (OT) yes;meets criteria;skilled treatment is necessary   OT Diagnosis Cognitive deficits and poor activity tolerance impacting safety.   OT Problem List-Impairments impacting ADL activity tolerance impaired;cognition;strength   Assessment of Occupational Performance 1-3 Performance Deficits   Identified Performance  Deficits Dressing, grooming/hygeine, community mobility   Planned Therapy Interventions (OT) cognition   Clinical Decision Making Complexity (OT) low complexity   Therapy Frequency (OT) 1x eval   Risk & Benefits of therapy have been explained evaluation/treatment results reviewed;patient   OT Discharge Planning    OT Discharge Recommendation (DC Rec) Long term care facility   OT Rationale for DC Rec SLUMS 15/30 dementia rating. Pt was agitated and attempted to terminate assessment. Patient had difficulties with memory recall, following directions, and ideation.  OT concerned with safety for higher level cognitive task.   OT recommends LTC due to cognitive status and level of assist needed. However, patient is refusing placement at this time. OT would recommend 24/7 assist at home and HHOT to further assess cognition within her home for safety to live independently.  Unable to get full evaluation due to arousal level and agitation on this date. Patient does not have any current IP needs at this time due to cognitive level and behaviors.  If appropriate please place new OT order.        Ivory Hannah, OTS

## 2021-03-01 NOTE — CONSULTS
"CLINICAL NUTRITION SERVICES - ASSESSMENT NOTE     Nutrition Prescription    RECOMMENDATIONS FOR MDs/PROVIDERS TO ORDER:  None    Malnutrition Status:    Unable to determine due to not able to gather a nutrition intake record  -Will continue to attempt reaching daughter by phone for a nutrition intake history    Recommendations already ordered by Registered Dietitian (RD):  Start Boost Plus chocolate BID    Future/Additional Recommendations:  Continue to encourage PO intake of meals and supplements     REASON FOR ASSESSMENT  Todd Clifford is a/an 86 year old female assessed by the dietitian for Provider Order - \"poor oral intake for 3-6 months per daughter\"    NUTRITION HISTORY  -Pt admitted for acute respiratory failure with hypoxia-suspected acute on chronic  -Hypokalemia: likely d/t nutrition deficiency per provider's note 2/28  -Cognitive status concerns  -Hx of HTN, anxiety, heavy smoking  -Pt has been very agitated and combative this morning     Visited w/pt through a pocket talker. Pt was able to hear RD speaking and commented many times how much she liked the pocket talker because she could hear so well through it. Pt reported that she's \"fine\" and didn't have any concerns with her nutrition. She was unable to provide any details about her usual dietary intake. She also refused the NFPA. RD asked her with several different approaches and explained the process, but she denied it, saying, \"I'm fine. I don't need it.\" A visual NFPA was completed instead.   -Attempted several times to call daughter (Gali), but the phone line was busy. Will re-attempt later today  -Pt okay with trying a nutrition supplement and likes chocolate flavor.     CURRENT NUTRITION ORDERS  Diet: Regular  Intake/Tolerance: 25% meals    LABS  Potassium   Date Value Ref Range Status   02/28/2021 4.0 3.4 - 5.3 mmol/L Final     Phosphorus   Date Value Ref Range Status   02/28/2021 2.7 2.5 - 4.5 mg/dL Final     Magnesium   Date Value Ref " "Range Status   02/28/2021 2.5 (H) 1.6 - 2.3 mg/dL Final     Glucose   Date Value Ref Range Status   02/28/2021 103 (H) 70 - 99 mg/dL Final   10/11/2020 99 70 - 99 mg/dL Final   11/12/2019 107 (H) 70 - 99 mg/dL Final     MEDICATIONS  Medications reviewed  Nicotine patch    ANTHROPOMETRICS  Height: 147.3 cm (4' 10\")  Most Recent Weight: 48.4 kg (106 lb 11.2 oz)    IBW: 90 lb/ 41 kg  BMI: Normal BMI (22.3)  Weight History:   Wt Readings from Last 10 Encounters:   03/01/21 48.4 kg (106 lb 11.2 oz)   01/05/21 48.1 kg (106 lb)   10/11/20 49 kg (108 lb)   11/12/19 47.6 kg (105 lb)   06/20/19 49.4 kg (108 lb 12.8 oz)   11/02/18 47.4 kg (104 lb 6.4 oz)   05/01/18 46.7 kg (103 lb)   11/28/17 47.2 kg (104 lb)   07/21/17 47.2 kg (104 lb)   07/14/17 47.1 kg (103 lb 12.8 oz)   -Pt has been maintaining wt    Dosing Weight: 48 kg (CBW)    ASSESSED NUTRITION NEEDS  Estimated Energy Needs: 9103-7809 kcals/day (25 - 30 kcals/kg)  Justification: Maintenance  Estimated Protein Needs: 48-58 grams protein/day (1 - 1.2 grams of pro/kg)  Justification: Maintenance  Estimated Fluid Needs: 1 mL/kcal  Justification: Maintenance    PHYSICAL FINDINGS  See malnutrition section below.  Muscle wasting     MALNUTRITION  % Intake: Unable to assess  % Weight Loss: None noted  Subcutaneous Fat Loss: None observed through visual assessment  Muscle Loss: Temporal:  severe  Fluid Accumulation/Edema: None noted  Malnutrition Diagnosis: Unable to determine due to not able to gather a nutrition intake record    NUTRITION DIAGNOSIS  Inadequate oral intake related to predicted cognitive decline as evidenced by severe muscle wasting, chart review indicating possible decline in cognitive function, pt unable to recall usual dietary intake, and daughter's report of decreased oral intake    INTERVENTIONS  Implementation  Nutrition Education: Not appropriate at this time due to patient condition   Medical food supplement therapy     Goals  Patient to consume % " of nutritionally adequate meal trays TID, or the equivalent with supplements/snacks.     Monitoring/Evaluation  Progress toward goals will be monitored and evaluated per protocol.      Abbey Cummins RD, LD  Clinical Dietitian  [facility]: [phone]  [facility]: [phone]

## 2021-03-01 NOTE — PROGRESS NOTES
"Patient around 5am became very agitated and combative toward staff. Grabbing at masks, clothing, and wrists. Patient tried climbing out of bed and when asked if patient had to go to the bathroom she denied. Patient then tried making it to the door. Staff assised patient to the bed and got combative again toward staff. Patient stated \"You are trying to kill me\" \"Let me be.\" Then continued to chant the lords prayer. At one point patient started to kick and swing around the walker. Daughter called at this point and updated. Cotton ball from  Lab draw was pulled off by patient and put in mouth by patient. It has been removed. MD notified and IM Zyprexa given with presence of security. PRN bedside attendant now in place. Will continue monitor patient.   "

## 2021-03-01 NOTE — PLAN OF CARE
Vital signs:  Temp: 97.2  F (36.2  C) Temp src: Oral BP: (!) 145/74 Pulse: 108   Resp: 28 SpO2: 90 % O2 Device: Nasal cannula Oxygen Delivery: 1/2 LPM   Patient is A&Ox4. Remains on 1/2L NC throughout the night. Writer tried weaning patient to room air but O2 occasionally dips below 86%. O2 when ambulating increased to 2-3L to keep sats above 86%. At one point O2 hit 78% with ambulation. Lungs are diminished with wheezes throughout. Patient remains SOB, dyspneic with exertion and tachypneic . Frequent coughing noted. Up with assist of 1 and walker. Patient remains very [place]. No complaints at this time. Will continue to monitor.

## 2021-03-01 NOTE — PROGRESS NOTES
[facility]    Medicine Progress Note - Hospitalist Service       Date of Admission:  2/28/2021  Assessment & Plan     Todd Clifford is a 86 year old female with a past medical history of hypertension, severe anxiety, and many year history of heavy smoking who presented to the hospital with concerns of worsening shortness of breath that has been progressively occurring over the past month.  Patient was found to have signs of respiratory distress with O2 saturation of 86-88% on RA.  CTA of the chest shows no PE or other significant findings.  On work-up patient has found to have either a metabolic alkalosis versus a compensated respiratory acidosis with acute respiratory uncompensated alkalosis.  She is also been found to have hypokalemia.  During this evaluation it was strongly suspected that patient may have some cognitive impairment, which her daughter has been suspecting in the home environment as well.  She was registered to observation for further evaluation of the etiology for her symptoms and to assess safe disposition plan.    Overnight the patient had severe worsening of her confusion with paranoia and aggression towards the nursing staff required IM Zyprexa.  Patient has been transitioned to inpatient status for ongoing evaluation and monitoring.  Currently, patient is calm and cooperative and has participated.  Slums score is 15 out of 30 and both physical therapy and Occupational Therapy stay patient is unsafe to return home without 24/7 support which is not possible.  She is not a rehab candidate and therefore long-term care will be needed and patient and daughter have been informed and are in agreement.  Echocardiogram results are currently pending and are not reviewed today.    Principal Problem:    Shortness of breath; respiratory failure with hypoxia - unclear if acute or chronic    Assessment: Unclear etiology without any obvious single source being present the  patient has had resolution of her shortness of breath with O2 supplementation.  If patient does have a compensated metabolic alkalosis the shortness of breath likely is allowing for the compensation to occur.  If there is a chronic compensated respiratory failure with acute respiratory alkalosis it is possible that anxiety is playing a role in the acute component.  COPD is suspected but not confirmed.      Plan: Continue with O2 supplementation to maintain saturations 8 6 to 94%, continue with high protocol potassium supplementation, recheck VBG tomorrow.  Await echocardiogram results to see if this would shed light on the etiology.    Active Problems:    Acute encephalopathy    Assessment:   Developing overnight has had sundowning in nature given patient's clearing this morning.  Given her cognitive impairment that has been identified, she is high risk for further sundowning and progression to delirium    Plan: Discussed with patient and daughter and will start Seroquel 12.5 mg at bedtime scheduled as well as 12.5 to 25 mg every 6 hours as needed for agitation/paranoia.  Monitor closely and provide bedside attendant as needed      Compensated metabolic alkalosis vs chronic compensated respiratory failure with acute respiratory alkalosis    Assessment: Patient has acid-base changes of unclear etiology as above    Plan: Proceed with potassium supplementation, oxygen supplementation, repeat VBG tomorrow morning with ongoing monitoring.      Hypokalemia    Assessment: Potassium of 2.8 on initial presentation, starting to improve following supplementation.  In discussion with patient's daughter this likely secondary to nutritional deficiency    Plan: We will supplement with high replacement protocol, have nutritionist evaluation to further assist and nutritional status.       Cognitive impairment - suspected    Assessment: Confirmed on slums score with 15 out of 30 results    Plan: Patient and her daughter are informed  and not surprised.  Her impairments do significantly limit her ability to fully comprehend circumstances and complex medical decisions      Dysphagia - suspected    Assessment: Nursing staff has noticed that patient sometimes will appear to regurgitate something she has followed and is unclear if she is not following it successfully or if she swallows it and it comes back up.  Daughter reports this does happen at home and patient is having more difficulty in swallowing in recent weeks    Plan: We will have speech therapy evaluate patient      Hoarse voice    Assessment: Patient's voice is becoming progressively more hoarse over the past 1-2 months    Plan: Start with speech therapy evaluation as above to see if mild chronic aspiration could be the etiology.  Would consider discussion with ENT going forward as well, as patient is high risk for cancer versus other etiology given her extensive smoking history.      Sinus tachycardia    Assessment: Heart rate mildly elevated in the 100-110 range of unclear significance    Plan: We will proceed with plan as above and monitor heart rate closely, await echocardiogram results      Generalized muscle weakness    Assessment: Not felt to be present by the patient but is a concern of the patient's daughter as she has noted significant decline in the past few months.  PT has evaluated the patient and feels that she is essentially at her baseline    Plan: We will plan for long-term care at time of discharge, social work is aware and will start working on placement options.      KIRILL (generalized anxiety disorder);   Mild major depression (H)    Assessment: On amitriptyline and as needed Xanax    Plan: Continue home regimen without change      Hypertension goal BP (blood pressure) < 140/80    Assessment: On clonidine and lisinopril.   Patient refused her clonidine this morning and blood pressures are mildly hypertensive but this could be a rebound hypertension    Plan: Continue  home regimen and monitor going forward      Multiple compression fracture of spine (H)    Assessment: Patient has numerous chronic fractures which does impair her activities of daily living at baseline    Plan: Plan for long-term care at time of discharge, as needed pain medicines as indicated       Diet: Regular Diet Adult  Snacks/Supplements Adult: Boost Plus; Between Meals    DVT Prophylaxis: Enoxaparin (Lovenox) SQ  Cruz Catheter: not present  Code Status: No CPR- Do NOT Intubate           Disposition Plan   Expected discharge: 2 - 3 days, recommended to Long-term care facility once Source of shortness of breath and respiratory failure have been further evaluated, behaviors are well controlled, safe disposition plan is in place..  Entered: Elicia Haider MD 03/01/2021, 2:57 PM       The patient's care was discussed with the Bedside Nurse, Care Coordinator/, Patient and Patient's Family.    Elicia Haider MD  Hospitalist Service  [facility]  Contact information available via [facility] Paging/Directory    ______________________________________________________________________    Interval History   Continues to require half liters to 2 L of nasal cannula oxygen to maintain saturation with improvement of her shortness of breath but ongoing alkalosis and poor activity tolerance noted.  She had significant paranoia and aggression last evening and required IM Zyprexa x1 with good results.  Appears more clear during the day today with slums score of 15 out of 30.      Data reviewed today: I reviewed all medications, new labs and imaging results over the last 24 hours.    Physical Exam   Vital Signs: Temp: 97.1  F (36.2  C) Temp src: Oral BP: 137/63 Pulse: 120   Resp: 24 SpO2: 91 % O2 Device: Nasal cannula Oxygen Delivery: 2 LPM  Weight: 106 lbs 11.2 oz  Constitutional: awake, alert, cooperative, no apparent distress, and appears stated age  Respiratory:  Mild tachypnea with respiratory rate in the low 20s noted but overall work of breathing improved from yesterday and patient appears comfortable.  Ongoing diminished breath sounds without significant wheezes or crackles noted  Cardiovascular: Ongoing sinus tachycardia in the low 100 range currently  GI: Bowel sounds present, abdomen soft and nontender  Skin: no redness, warmth, or swelling and no rashes  Musculoskeletal: no lower extremity pitting edema present  Neurologic: Awake, alert, oriented to person, place, year but not month, somewhat oriented to situation but is agreeable to long-term care    Data   Recent Labs   Lab 03/01/21  1228 02/28/21  1839 02/28/21  1316 02/28/21  0751   WBC  --   --   --  6.4   HGB  --   --   --  12.0   MCV  --   --   --  99   PLT  --   --   --  229     --   --  138   POTASSIUM 3.7 4.0 3.0* 2.8*   CHLORIDE 100  --   --  95   CO2 35*  --   --  35*   BUN 14  --   --  19   CR 0.56  0.55  --   --  0.69   ANIONGAP 3  --   --  8   PATRICIA 9.3  --   --  9.3   *  --   --  103*   ALBUMIN  --   --   --  3.4   PROTTOTAL  --   --   --  7.2   BILITOTAL  --   --   --  0.6   ALKPHOS  --   --   --  109   ALT  --   --   --  30   AST  --   --   --  21   TROPI  --   --   --  0.018

## 2021-03-01 NOTE — PROGRESS NOTES
..Patient has been assessed for Home Oxygen needs.  Oxygen readings:   *   RA - at rest  Pulse oximetry SPO2 83 %  *   O2 at 2 liters/minute (at rest) ...SPO2 91 %  *   O2 at  2 liters/minute (during activity/with exercise) ...SPO2  =93 %

## 2021-03-01 NOTE — PLAN OF CARE
"  Problem: Adult Inpatient Plan of Care  Goal: Plan of Care Review  Outcome: No Change     Problem: Altered Behavior (Delirium)  Goal: Improved Behavioral Control  Outcome: No Change     Patient improved overall, but continues to be paranoid at times. Her daughter reports (while she was here) that patient told her in the emergency room \"the staff threw her on the floor and into a closet.\" and said \"the girls here are trying to kill me, they have a secret room here that they put me in and they tie me up\" Explained to the daughter that there were hand mitts on her this morning upon shift change at 0700 because to alleviate her from pulling IV out and from being violent like throwing her walker at staff, but that the mitts have been off since at least breakfast time.  Patient has been off 1:1 status since  after lunch 1230 pm or so.  Have given prn Xanax x 1 (patient asked for this, herself) and offered it again around 1730, but she declined it at that time. Bed alarm is active.  Does walk into the bathroom with assist of 1 and walker, gait belt and slipper socks for safety. SLUMS score today 15/30.  Swallow evaluation ordered for tomorrow.  Patient was changed to in-patient status today and is on Lovenox for VTE (but patient declined Lovenox earlier today).  Patient dips to 86% on room air, up to 2 LPM nasal cannula oxygen.  1 Duoneb given for wheezing during day shift. Potassium replacement given, recheck needed for a.m. draw.   "

## 2021-03-02 PROBLEM — J96.91 RESPIRATORY FAILURE WITH HYPOXIA (H): Status: ACTIVE | Noted: 2021-01-01

## 2021-03-02 NOTE — PROVIDER NOTIFICATION
S-MD notified, request zyprexa IM for agitation  B-hypokalemia  A-Confused, agitated, refusing to take po medications. Same type of event happened last night and IM zyprexa was given with some results.  R-New order received.  Davi Sunshine RN       Xray at bedside

## 2021-03-02 NOTE — PLAN OF CARE
Temp: 99.2  F (37.3  C) Temp src: Oral BP: (!) 144/75 Pulse: 119   Resp: 20 SpO2: 95 % O2 Device: Nasal cannula Oxygen Delivery: 2 LPM    Neuro: A/O to self, confused but pleasant  Pulm: lung sounds wheezing heard throughout, 2L NC  GI: bowel sounds normoactive x4  : adequate urine output  Skin: scattered bruising to upper extremities  Lines/Tubes/Drains: none, peripheral IV removed due to redness and irritation at site    Patient refused medication this AM, did take 1 Xanax and 1 TUMS  Swallow eval complete, dysphagia level 2 with thin liquids.   Patient on monitor at desk, bed alarm in place  K+ 3.8, replaced. Recheck planned for AM  Plan: continue current care plan, plan to discharge to NH.

## 2021-03-02 NOTE — PROGRESS NOTES
"   03/02/21 0800   General Information   Onset of Illness/Injury or Date of Surgery 02/28/21   Referring Physician Dr. Elicia Haider   Pertinent History of Current Problem Todd Clifford is a 86 year old female with a past medical history of hypertension, severe anxiety, and many year history of heavy smoking admitted 2/28/21 with concerns of worsening shortness of breath that has been progressively occurring over the past month. Per chart review, \"Nursing staff has noticed that patient sometimes will appear to regurgitate something she has swallowed and is unclear if she is not swallowing it successfully or if she swallows it and it comes back up.  Daughter reports this does happen at home and patient is having more difficulty in swallowing in recent weeks.\"   General Observations Patient reporting feeling of food getting stuck in her chest. Patient requesting Tums throughout evaluation.   Disability/Function   Hearing Difficulty or Deaf yes   Patient's preferred means of communication English speaker with hearing loss, no speech problems.   Describe hearing loss bilateral hearing loss   Use of hearing assistive devices bilateral hearing aids;amplifier   Were auxiliary aids offered? yes   The following aids were provided; pocket talker   Hearing Management hearing aides   Wear Glasses or Blind no   Concentrating, Remembering or Making Decisions Difficulty yes   Difficulty Communicating yes   Communication other (see comments)  (hearing)   Difficulty Eating/Swallowing yes   Eating/Swallowing other (see comments)   Eating/Swallowing Management pills in applesauce   Type of Evaluation   Type of Evaluation Swallow Evaluation   Oral Motor   Oral Musculature anomalies present   Structural Abnormalities present   Mucosal Quality dry   Dentition (Oral Motor)   Dentition (Oral Motor) significant number of missing teeth   Facial Symmetry (Oral Motor)   Facial Symmetry (Oral Motor) WNL   Lip Function (Oral Motor)   Lip " Range of Motion (Oral Motor) unable/difficult to assess   Tongue Function (Oral Motor)   Tongue ROM (Oral Motor) unable/difficult to assess   Jaw Function (Oral Motor)   Jaw Function (Oral Motor) WNL   Cough/Swallow/Gag Reflex (Oral Motor)   Soft Palate/Velum (Oral Motor) unable/difficult to assess   Vocal Quality/Secretion Management (Oral Motor)   Vocal Quality (Oral Motor) impaired duration of phonation;strained/strangled   Secretion Management (Oral Motor) WNL   General Swallowing Observations   Current Diet/Method of Nutritional Intake (General Swallowing Observations, NIS) thin liquids;regular diet   Respiratory Support (General Swallowing Observations) nasal cannula   Swallowing Evaluation Clinical swallow evaluation   Clinical Swallow Evaluation   Feeding Assistance set up only required   Clinical Swallow Evaluation Textures Trialed Thin Liquids;Puree Textures   Clinical Swallow Eval: Thin Liquid Texture Trial   Mode of Presentation, Thin Liquids cup;straw   Volume of Liquid or Food Presented 3 ounces   Oral Phase of Swallow WFL   Pharyngeal Phase of Swallow intact   Diagnostic Statement Functional swallow with trials of thin liquid. No overt s/sx of penetration/aspiration.   Clinical Swallow Evaluation: Puree Solid Texture Trial   Mode of Presentation, Puree spoon;fed by clinician   Volume of Puree Presented 2 tablespoon   Oral Phase, Puree WFL   Pharyngeal Phase, Puree impaired;regurgitation of food/liquids;other (see comments)  (feeling of something stuck in chest)   Diagnostic Statement No overt s/sx of aspiration, however, patient reporting feeling of something stuck in chest and delayed regurgitation of bolus x1. No further trials assessed secondary to patient refusal.    Esophageal Phase of Swallow   Patient reports or presents with symptoms of esophageal dysphagia Yes   Esophageal comments Patient reporting feeling of something stuck in chest while eating. Patient with delayed regurgitation of bolus  x1. Patient requesting tums,   Swallowing Recommendations   Diet Consistency Recommendations thin liquids;dysphagia level 2 (mechanically altered)   Supervision Level for Intake close supervision needed   Mode of Delivery Recommendations bolus size, small;slow rate of intake   Swallowing Maneuver Recommendations alternate food and liquid intake   Monitoring/Assistance Required (Eating/Swallowing) monitor for cough or change in vocal quality with intake   Recommended Feeding/Eating Techniques (Swallow Eval) maintain upright posture during/after eating for 30 minutes   Medication Administration Recommendations, Swallowing (SLP) pills in food carrier   Comment, Swallowing Recommendations Patient presents with moderate oropharyngeal dysphagia characterized by globus sensation with solids and regurgitation following oral intake x1. Limited trials completed this date secondary to patient refusal. Recommend dysphagia 2 diet with thin liquids. Patient to remain upright and midline with all PO intake and remain upright for 30 minutes to ensure oral and pharyngeal clearance.    General Therapy Interventions   Planned Therapy Interventions Dysphagia Treatment   Dysphagia treatment Modified diet education;Instruction of safe swallow strategies   Intervention Comments Patient will benefit from skilled speech language therapy in order to maximize safety with oral intake and reduce risk of aspiration and pneumonia.   SLP Therapy Assessment/Plan   Criteria for Skilled Therapeutic Interventions Met (SLP Eval) yes;treatment indicated   SLP Diagnosis moderate oropharyngeal dysphagia   Rehab Potential (SLP Eval) good, to achieve stated therapy goals   Therapy Frequency (SLP Eval) 3 times/wk   Predicted Duration of Therapy Intervention (SLP Eval) LOS   Therapy Plan Review/Discharge Plan (SLP)   Therapy Plan Review (SLP) evaluation/treatment results reviewed;care plan/treatment goals reviewed;participants included;patient  (nurse)   SLP  Discharge Planning    SLP Discharge Recommendation (DC Rec) home with home care speech therapy   SLP Rationale for DC Rec To maximize safety with oral intake and reduce risk of aspiration and pneumonia.   SLP Brief overview of current status  Patient presents with moderate oropharyngeal dysphagia characterized by globus sensation with solids and regurgitation following oral intake x1. Limited trials completed this date secondary to patient refusal. Recommend dysphagia 2 diet with thin liquids. Patient to remain upright and midline with all PO intake and remain upright for 30 minutes to ensure oral and pharyngeal clearance.     Total Evaluation Time   Total Evaluation Time (Minutes) 20   Coping Strategies   Trust Relationship/Rapport care explained;choices provided;questions answered;empathic listening provided;thoughts/feelings acknowledged       Thank you for this referral!    Muna Fierro MA, CCC-SLP  [facility]  [phone]

## 2021-03-02 NOTE — PROGRESS NOTES
Care Management Follow Up    Length of Stay (days): 1    Expected Discharge Date: 03/03/21  Expected Time of Departure: 10:00am  Concerns to be Addressed: discharge planning     Patient plan of care discussed at interdisciplinary rounds: Yes    Anticipated Discharge Disposition: Long Term Care     Anticipated Discharge Services: None  Anticipated Discharge DME: N/a     Patient/family educated on Medicare website which has current facility and service quality ratings: yes  Education Provided on the Discharge Plan:  Yes   Patient/Family in Agreement with the Plan: yes, piyushr, Natalie     Referrals Placed by CM/SW: Handoff completed   Private pay costs discussed: Deposit of $6,000 for NH, transportation costs     Additional Information:  Met w/ patricia, Natalie bedside. Patient has been accepted at [facility] (Phone: [phone] Fax: [phone]) for tomorrow. SNF unable to admit today d/t timing. Daughter agreeable to plan. Patient sleeping soundly and unable to arouse. Per Natalie, patient does not have $6,000 in her checking for the deposit required by SNF. Natalie did try to call the bank to transfer funds but dtr is not authorized to do so. Patient unable to provide consent to bank. Natalie plans to come early in the morning tomorrow in hopes that patient will be more alert to call the bank and transfer funds.    Wheelchair transportation arranged w/ [employer] for 10am p/u. [employer] will p/u transport 02 tank from [facility]. Writer updated Natalie on private pay cost of $50. Natalie will bring in a check tomorrow morning.     Per request from Natalie, writer provided info on POA and provided document, conservator and Elder Law information.     Writer called [facility] per request of Natalie to be out on wait list for LTC.    Handoff completed.     PLAN: Discharge to [facility] on 3/3/21 at 10am via [employer].       LILIYA Ramírez  Care Management   [phone]

## 2021-03-02 NOTE — PLAN OF CARE
Problem: Attention and Thought Clarity Impairment (Delirium)  Goal: Improved Attention and Thought Clarity  Outcome: No Change     Problem: Adult Inpatient Plan of Care  Goal: Plan of Care Review  Outcome: Improving  Goal: Absence of Hospital-Acquired Illness or Injury  Outcome: Improving  Intervention: Identify and Manage Fall Risk  Recent Flowsheet Documentation  Taken 3/2/2021 0100 by Robert Gallo RN  Safety Promotion/Fall Prevention:   activity supervised   bed alarm on   clutter free environment maintained   fall prevention program maintained   increased rounding and observation   increase visualization of patient   lighting adjusted   patient video monitoring   room door open   room organization consistent   safety round/check completed   supervised activity  Intervention: Prevent Skin Injury  Recent Flowsheet Documentation  Taken 3/2/2021 0100 by Robert Gallo RN  Body Position: position changed independently  Intervention: Prevent and Manage VTE (Venous Thromboembolism) Risk  Recent Flowsheet Documentation  Taken 3/2/2021 0100 by Robert Gallo RN  VTE Prevention/Management: anticoagulant therapy maintained  Goal: Optimal Comfort and Wellbeing  Outcome: Improving  Goal: Readiness for Transition of Care  Outcome: Improving     Problem: Fall Injury Risk  Goal: Absence of Fall and Fall-Related Injury  Outcome: Improving  Intervention: Promote Injury-Free Environment  Recent Flowsheet Documentation  Taken 3/2/2021 0100 by Robert Gallo RN  Safety Promotion/Fall Prevention:   activity supervised   bed alarm on   clutter free environment maintained   fall prevention program maintained   increased rounding and observation   increase visualization of patient   lighting adjusted   patient video monitoring   room door open   room organization consistent   safety round/check completed   supervised activity     Problem: Adjustment to Illness (Delirium)  Goal: Optimal Coping  Outcome: Improving     Problem:  "Altered Behavior (Delirium)  Goal: Improved Behavioral Control  Outcome: Improving     Problem: Sleep Disturbance (Delirium)  Goal: Improved Sleep  Outcome: Improving     Problem: Gas Exchange Impaired  Goal: Optimal Gas Exchange  Outcome: Improving  Intervention: Optimize Oxygenation and Ventilation  Recent Flowsheet Documentation  Taken 3/2/2021 0100 by Robert Gallo, RN  Head of Bed (HOB) Positioning: HOB at 45 degrees     Pt remains alert and oriented to self, and to situation at times but has remained confused throughout the shift.   Pt does not call as advised, bed alarm remains in place and Pt remains free of falls an injury. Pt is short of breath with activity, oxygen saturations have been stable on 2lpm.   Vitals remain stable with some hypertension.   BP (!) 155/81   Pulse 92   Temp 97.9  F (36.6  C) (Oral)   Resp 19   Ht 1.473 m (4' 10\")   Wt 48.4 kg (106 lb 11.2 oz)   SpO2 91%   BMI 22.30 kg/m    Up with assist of one, walker and gait belt without complication.   Pocket talker used for communication.   Seroquel given as scheduled early this shift.   Will continue to monitor and follow plan of care.   "

## 2021-03-02 NOTE — PROGRESS NOTES
S-(situation): Patient changed to inpatient status    B-(background): Patient status change due to observation goals not being met.     A-(assessment): hypoxia, electrolyte imbalance, delerium    R-(recommendations): Will monitor patient per MD orders.       Inpatient nursing criteria listed below were met:    Adult Profile completedNA  Health care directives status obtained and documented: NA  VTE prophylaxis orders: Lovenox ordered, patient declined  (FYI: Asprin is not an approved anticoagulation for DVT prophylaxis)  SCD's Documented: declines  Vaccine assessment done and vaccine ordered if needed. declines  My Chart patient sign up addressed and documented: declines  Care Plan initiated: Yes  Discharge planning review completed (admission navigator) Yes

## 2021-03-02 NOTE — PLAN OF CARE
4 hour shift note:    Vital signs:  Temp: 97.6  F (36.4  C) Temp src: Oral BP: (!) 163/87 Pulse: 101   Resp: 18 SpO2: 91 % O2 Device: Nasal cannula Oxygen Delivery: 2 LPM   Denies pain. PRN xanax was attempted to be given but patient refused and threw her water. IM Zyprexa ordered and available when needed. 1 hour later writer reattempted xanax and patient took it. Been resting in bed for most of shift. Has set bed alarm off occasionally to go to bathroom. No complaints at this time. Will continue to monitor.

## 2021-03-02 NOTE — CONSULTS
Care Management Initial Consult    General Information  Assessment completed with: Natalie Owen   Type of CM/SW Visit: Initial Assessment    Primary Care Provider verified and updated as needed: Yes   Readmission within the last 30 days: no previous admission in last 30 days         Advance Care Planning: Advance Care Planning Reviewed: no concerns identified          Communication Assessment  Patient's communication style: spoken language (English or Bilingual)(Difficult to understand, very [place], uses pocket talker)    Hearing Difficulty or Deaf: yes   Wear Glasses or Blind: no    Cognitive  Cognitive/Neuro/Behavioral: .WDL except, mood/behavior, arousability  Level of Consciousness: alert  Arousal Level: opens eyes spontaneously  Orientation: situation  Mood/Behavior: anxious, cooperative  Best Language: 1 - Mild to moderate  Speech: forced/pressured, hoarse    Living Environment:   People in home: alone     Current living Arrangements: house      Able to return to prior arrangements: no  Living Arrangement Comments: (Chart review states house is disgusting )    Family/Social Support:  Care provided by: self, child(lia)  Provides care for: no one  Marital Status:   Children          Description of Support System: Involved    Support Assessment: Adequate family and caregiver support    Current Resources:   Patient receiving home care services: No     Community Resources: None  Equipment currently used at home: cane, straight, walker, rolling  Supplies currently used at home:      Employment/Financial:  Employment Status: retired        Financial Concerns: No concerns identified           Lifestyle & Psychosocial Needs:        Socioeconomic History     Marital status:      Spouse name: Not on file     Number of children: Not on file     Years of education: Not on file     Highest education level: Not on file     Tobacco Use     Smoking status: Current Every Day Smoker     Packs/day: 1.00     Types:  "Cigarettes     Smokeless tobacco: Never Used   Substance and Sexual Activity     Alcohol use: No     Drug use: No     Sexual activity: Not Currently     Partners: Male       Functional Status:  Prior to admission patient needed assistance:   Dependent ADLs:: Bathing, Grooming     Assesssment of Functional Status: Not at baseline with ADL Functioning, Has complex medical needs, requires placement in a facility    Mental Health Status:  Mental Health Status: No Current Concerns       Chemical Dependency Status:  Chemical Dependency Status: No Current Concerns             Values/Beliefs:  Spiritual, Cultural Beliefs, Taoism Practices, Values that affect care:                 Additional Information:  Writer spoke w/ Natalie gomez via phone and introduced self and role. Patient is [place] and difficult to communicate with even with pocket talker. Patient gave permission for writer to work with Natalie for placement.     Therapy did eval patient and recommend LTC. Daughter reports that patient is no longer safe at home, unable to care for self and house is \"disgusting.\" Daughter would prefer to have her Mom close to her home in [place]. Writer did discuss finances. Per Natalie, patient has around $45,000 in checking/saving and also owns a home which she intends to sell. Writer explained that LTC SNF will need a down payment up front. Writer also spoke about transportation costs.     Patient does not have a portable 02 tank for discharge.     Writer left information and form for POA in patients room.     Writer made referrals to the following in preference:    [facility] (Phone: [phone] Fax: [phone])- No LTC female bed.     [facility] (Admissions: [phone], Main Phone: [phone] Fax: [phone]). No appropriate bed available.     [facility] [place]- No beds until next week.     [facility] (Main Phone: " [phone] Admissions Phone: [phone] Fax: [phone]). ACCEPTED FOR TODAY. NEEDS $6,000 deposit at admission.     [facility] (Admissions: [phone] Main Phone: [phone] Fax: [phone]). ACCEPTED FOR TODAY. NEEDS $6,000 deposit at admission.     PAS completed #[id number].       LILIYA Ramírez  Care Management  [phone]

## 2021-03-02 NOTE — PROGRESS NOTES
[facility]    Medicine Progress Note - Hospitalist Service       Date of Admission:  2/28/2021  Assessment & Plan     Todd Clifford is a 86 year old female with a past medical history of hypertension, severe anxiety, and many year history of heavy smoking who presented to the hospital with concerns of worsening shortness of breath that has been progressively occurring over the past month.  Patient was found to have signs of respiratory distress with O2 saturation of 86-88% on RA.  CTA of the chest shows no PE or other significant findings.  On work-up patient has found to have either a metabolic alkalosis versus a compensated respiratory acidosis with acute respiratory uncompensated alkalosis.  She is also been found to have hypokalemia.  During this evaluation it was strongly suspected that patient may have some cognitive impairment, which her daughter has been suspecting in the home environment as well.  She was registered to observation for further evaluation of the etiology for her symptoms and to assess safe disposition plan.    Overnight the patient had severe worsening of her confusion with paranoia and aggression towards the nursing staff required IM Zyprexa.  Patient has been transitioned to inpatient status for ongoing evaluation and monitoring.  Currently, patient is calm and cooperative and has participated.  Slums score is 15 out of 30 and both physical therapy and Occupational Therapy stay patient is unsafe to return home without 24/7 support which is not possible.  She is not a rehab candidate and therefore long-term care will be needed and patient and daughter have been informed and are in agreement.  Echocardiogram results are currently pending and are not reviewed today.      3/2 :       Patient is clinically stable with some episodes of agitation but otherwise conscious and oriented  Respiratory status stable, on 1-2 liters of oxygen  CM team working on  placement  No new symptoms or clinical issues noted today  Swallow evaluation done and dysphagia 2 diet recommended  Will start low dose prednisone for possible advanced COPD    To be discharged once placement found.        Principal Problem:    Shortness of breath; respiratory failure with hypoxia - unclear if acute or chronic    Assessment: Unclear etiology without any obvious single source being present the patient has had resolution of her shortness of breath with O2 supplementation.  If patient does have a compensated metabolic alkalosis the shortness of breath likely is allowing for the compensation to occur.  If there is a chronic compensated respiratory failure with acute respiratory alkalosis it is possible that anxiety is playing a role in the acute component.  COPD is suspected but not confirmed.      Plan: Continue with O2 supplementation to maintain saturations 8 6 to 94%, continue with high protocol potassium supplementation, recheck VBG tomorrow.  Await echocardiogram results to see if this would shed light on the etiology.    Active Problems:    Acute encephalopathy    Assessment:   Developing overnight has had sundowning in nature given patient's clearing this morning.  Given her cognitive impairment that has been identified, she is high risk for further sundowning and progression to delirium    Plan: Discussed with patient and daughter and will start Seroquel 12.5 mg at bedtime scheduled as well as 12.5 to 25 mg every 6 hours as needed for agitation/paranoia.  Monitor closely and provide bedside attendant as needed      Compensated metabolic alkalosis vs chronic compensated respiratory failure with acute respiratory alkalosis    Assessment: Patient has acid-base changes of unclear etiology as above    Plan: Proceed with potassium supplementation, oxygen supplementation, repeat VBG tomorrow morning with ongoing monitoring.      Hypokalemia    Assessment: Potassium of 2.8 on initial presentation,  starting to improve following supplementation.  In discussion with patient's daughter this likely secondary to nutritional deficiency    Plan: We will supplement with high replacement protocol, have nutritionist evaluation to further assist and nutritional status.       Cognitive impairment - suspected    Assessment: Confirmed on slums score with 15 out of 30 results    Plan: Patient and her daughter are informed and not surprised.  Her impairments do significantly limit her ability to fully comprehend circumstances and complex medical decisions      Dysphagia - suspected    Assessment: Nursing staff has noticed that patient sometimes will appear to regurgitate something she has followed and is unclear if she is not following it successfully or if she swallows it and it comes back up.  Daughter reports this does happen at home and patient is having more difficulty in swallowing in recent weeks    Plan: We will have speech therapy evaluate patient      Hoarse voice    Assessment: Patient's voice is becoming progressively more hoarse over the past 1-2 months    Plan: Start with speech therapy evaluation as above to see if mild chronic aspiration could be the etiology.  Would consider discussion with ENT going forward as well, as patient is high risk for cancer versus other etiology given her extensive smoking history.      Sinus tachycardia    Assessment: Heart rate mildly elevated in the 100-110 range of unclear significance    Plan: We will proceed with plan as above and monitor heart rate closely, await echocardiogram results      Generalized muscle weakness    Assessment: Not felt to be present by the patient but is a concern of the patient's daughter as she has noted significant decline in the past few months.  PT has evaluated the patient and feels that she is essentially at her baseline    Plan: We will plan for long-term care at time of discharge, social work is aware and will start working on placement  options.      KIRILL (generalized anxiety disorder);   Mild major depression (H)    Assessment: On amitriptyline and as needed Xanax    Plan: Continue home regimen without change      Hypertension goal BP (blood pressure) < 140/80    Assessment: On clonidine and lisinopril.   Patient refused her clonidine this morning and blood pressures are mildly hypertensive but this could be a rebound hypertension    Plan: Continue home regimen and monitor going forward      Multiple compression fracture of spine (H)    Assessment: Patient has numerous chronic fractures which does impair her activities of daily living at baseline    Plan: Plan for long-term care at time of discharge, as needed pain medicines as indicated       Diet: Snacks/Supplements Adult: Boost Plus; Between Meals  Dysphagia Diet Level 2 Mech Altered Thin Liquids (water, ice chips, juice, milk gelatin, ice cream, etc)    DVT Prophylaxis: Enoxaparin (Lovenox) SQ  Cruz Catheter: not present  Code Status: No CPR- Do NOT Intubate           Disposition Plan   Expected discharge: in am recommended to Long-term care facility once placement found.  Entered: Drake Colunga MD 03/02/2021, 4:33 PM       The patient's care was discussed with the Bedside Nurse, Care Coordinator/, Patient and Patient's Family.    Drake Colunga MD  Hospitalist Service  [facility]  Contact information available via [facility] Paging/Directory    ______________________________________________________________________      Data reviewed today: I reviewed all medications, new labs and imaging results over the last 24 hours.    Physical Exam   Vital Signs: Temp: 99.2  F (37.3  C) Temp src: Oral BP: (!) 144/75 Pulse: 119   Resp: 20 SpO2: 95 % O2 Device: Nasal cannula Oxygen Delivery: 2 LPM  Weight: 106 lbs 11.2 oz  Constitutional: awake, alert, cooperative, no apparent distress, and appears stated age  Respiratory: Mild tachypnea with respiratory rate in the  low 20s noted but overall work of breathing improved from yesterday and patient appears comfortable.  Ongoing diminished breath sounds without significant wheezes or crackles noted  Cardiovascular: Ongoing sinus tachycardia in the low 100 range currently  GI: Bowel sounds present, abdomen soft and nontender  Skin: no redness, warmth, or swelling and no rashes  Musculoskeletal: no lower extremity pitting edema present  Neurologic: Awake, alert, oriented to person, place, year but not month, somewhat oriented to situation but is agreeable to long-term care    Data   Recent Labs   Lab 03/02/21  0546 03/01/21  1228 02/28/21  1839 02/28/21  0751 02/28/21  0751   WBC  --   --   --   --  6.4   HGB  --   --   --   --  12.0   MCV  --   --   --   --  99   PLT  --   --   --   --  229    138  --   --  138   POTASSIUM 3.8 3.7 4.0   < > 2.8*   CHLORIDE 102 100  --   --  95   CO2 35* 35*  --   --  35*   BUN 12 14  --   --  19   CR 0.60 0.56  0.55  --   --  0.69   ANIONGAP 3 3  --   --  8   PATRICIA 8.9 9.3  --   --  9.3   * 128*  --   --  103*   ALBUMIN  --   --   --   --  3.4   PROTTOTAL  --   --   --   --  7.2   BILITOTAL  --   --   --   --  0.6   ALKPHOS  --   --   --   --  109   ALT  --   --   --   --  30   AST  --   --   --   --  21   TROPI  --   --   --   --  0.018    < > = values in this interval not displayed.

## 2021-03-03 NOTE — PROGRESS NOTES
Speech Language Therapy Discharge Summary    Reason for therapy discharge:    Discharged to long term care facility.    Progress towards therapy goal(s). See goals on Care Plan in [facility] electronic health record for goal details.  Goals not met.  Barriers to achieving goals:   discharge from facility.    Therapy recommendation(s):    Continued therapy is recommended.  Rationale/Recommendations:  To maximize safety with oral intake and reduce risk of aspiration and pneumonia..

## 2021-03-03 NOTE — PROGRESS NOTES
S- Transfer to [facility] from 270.    B- hypokalemia, hypoxia/ unresponsive hypercapnea    A- Brief systems assessment: lungs course with productive cough patient was obtunded and only responded to painful stimuli. Oxygen decreased to 1 liter nasal canula to keep saturations > 85 but < 95, ABG obtained at 2130   Last Arterial Blood Gas:  pH Arterial   Date Value Ref Range Status   03/02/2021 7.27 (L) 7.35 - 7.45 pH Final     pCO2 Arterial   Date Value Ref Range Status   03/02/2021 89 (HH) 35 - 45 mm Hg Final     Comment:     Critical Value called to and read back by  XIOMARA DEWEY ON [facility] SUR[facility],,[id number]       pO2 Arterial   Date Value Ref Range Status   03/02/2021 32 (LL) 80 - 105 mm Hg Final     Comment:     Critical Value called to and read back by  XIOMARA DEWEY ON [facility] SURG,,[phone] [id number]       Bicarbonate Arterial   Date Value Ref Range Status   03/02/2021 41 (H) 21 - 28 mmol/L Final     Base Excess Art   Date Value Ref Range Status   03/02/2021 10.3 mmol/L Final     Comment:     Abnormal Result, Ref range: -9.0 to 1.8   MD aware and transferred patient to ICU. Just prior to transfer patient awake and talking, hallucinating items and animals in her room. paranoid that someone is going to kill her or the nurses and that the monroe is going to come see her. Attempted to give her scheduled medications with out success. She stated that she would vomit but denied any nausea, refused to drink any liquids also.   I repeated the abg without the BiPAP to assess.     R- Transfer to ICU per physician orders. Continue to monitor pt and update physician as needed.      Code status: DNR / DNI  Skin: intact with bruises  Fall Risk: Yes- Department fall risk interventions implemented.  Isolation and Signage: None  Medication drips upon transfer: none  Blue Bin checked and medications transfer out with patient yes    Repeat gas on 1 liter with her awake and responsive. Patient at baseline mentation.  Last Arterial Blood Gas:  pH Arterial    Date Value Ref Range Status   03/02/2021 7.35 7.35 - 7.45 pH Final     pCO2 Arterial   Date Value Ref Range Status   03/02/2021 65 (H) 35 - 45 mm Hg Final     pO2 Arterial   Date Value Ref Range Status   03/02/2021 41 (L) 80 - 105 mm Hg Final     Bicarbonate Arterial   Date Value Ref Range Status   03/02/2021 36 (H) 21 - 28 mmol/L Final     Base Excess Art   Date Value Ref Range Status   03/02/2021 8.2 mmol/L Final     Comment:     Abnormal Result, Ref range: -9.0 to 1.8

## 2021-03-03 NOTE — PROGRESS NOTES
Clinic Care Coordination Contact  Care Coordination Transition Communication    Clinical Data: Patient was hospitalized at [facility] from 2/28 to 3/3/2021 with diagnosis of acute on chronic respiratory failure with hypoxia.     Transition to Facility:              [facility] Name: [facility] (Phone: [phone] Fax: [phone])          Plan: Patient will be transition to [facility].  No out reach by care coordination at this time    Cande MARRUFO, RN, PHN, [facility]  Primary Clinic Care Coordination    [facility]  Primary Care Clinics  [email or web address]   Office: [phone]  Employed by [facility]

## 2021-03-03 NOTE — PLAN OF CARE
S-(situation): shift note    B-(background): hypoxia/hypercapnia, altered level of consciousness    A-(assessment): patient obtunded on start of shift. Oxygenation 97% on 2 liters so oxygen turned down. RR 20-28 Heart rate  sinus rhythm. MD made aware gases obtained and patient transferred to the unit to receive BiPAP, patient upon arrival to ICU became awake alert , confused to place time and situation. Was paranoid, hallucinating  and was on 1 liter nasal cannula gases repeated an appeared better. MD made aware and BiPAP not placed at this time. And patient monitored. She had varying degrees of alertness, was always confused and at times resistive to cares. She did not void and had a bladder scan of 450 and > 10 hours without urine. She was straight catheterized for 700 ml of dark natty urine .   Lungs course and she has been on 1-3 liters nasal cannula and a brief period on oxymask. She has the best alertness on the 1 liter nasal cannula. Saturation on this varies from 85 to 96% mostly 86-92%. She has a strong cough and it sounds productive but no sputum at this time. Heart rate  temp up 101.1 rectally. Tylenol suppos given and temp down to 99.1     R-(recommendations): will continue to monitor respiratory status and keep oxygen at 1 liter nasal cannula as she appears more awake at that oxygen level. Monitor for urine output and document accordingly.   Provide pocket talker so patient may communicate and reorient as able.

## 2021-03-03 NOTE — PLAN OF CARE
Pt has been very lethargic this afternoon. Did open eyes and resisted during lab draws. Back to sleep after cares. O2 sats down to 76% RA, back up to 88% on 2L. Refused oral intake this evening due to increased lethargic. Has no documented void greater than 12hrs. Bladder scan of 156ml.  Reported off to oncoming RN. Plan to continue to monitor.

## 2021-03-03 NOTE — PROVIDER NOTIFICATION
S-(situation): MD paged for fever, no urine documented since 00, and obtunded    B-(background): Hypokalemia, Hypoxia    A-(assessment): Patient obtunded, withdraws to painful stimuli. Rectal temp 101.1. No documented void since midnight. Bladder scan shows 140ml. Not taking po at this time. Lung sounds coarse, with productive sounding cough.  Unable to give scheduled po meds.      R-(recommendations): Requested VBG, and ?IVF's.  Awaiting new orders  Davi Sunshine RN

## 2021-03-03 NOTE — PROGRESS NOTES
[place]i Clifford  Gender: female  : 1934  [address] [address]  [phone] (home)     Medical Record: [id number]  Pharmacy:    [id number] - [place], [place] - [address] [facility] [place] [address]  Primary Care Provider: Zuly Carrington    Parent's names are: Data Unavailable (mother) and Data Unavailable (father).      [facility]  March 3, 2021     Discharge Phone Call: Discharge to  [place]

## 2021-03-03 NOTE — PROGRESS NOTES
Care Management Discharge Note    Discharge Date: 03/03/21    Discharge Disposition: Long Term Care    Discharge Services: None    Discharge DME: N/a     Discharge Transportation: agency, non-emergent ambulance     Private pay costs discussed: transportation costs    PAS Confirmation Code: [id number]  Patient/family educated on Medicare website which has current facility and service quality ratings: yes    Education Provided on the Discharge Plan:  Yes   Persons Notified of Discharge Plans: Natalie Gomez   Patient/Family in Agreement with the Plan: Yes    Handoff Referral Completed: Yes    Additional Information:  Patient to discharge to [facility] (Main Phone: [phone] Admissions Phone: [phone] Fax: [phone]) for LTC today after orders are complete and transport arranged. MD spoke to Natalie gomez re: recommendation of hospice. Dtr is agreeable and prefers [facility]  (Phone: [phone]). Writer spoke w/ Andie at [facility] and can met tomorrow at [facility] to sign on.     Plan: Discharge to [facility] (Main Phone: [phone] Admissions Phone: [phone] Fax: [phone]) today for LTC. Hospice to sign on 3/4/21 at 11am. Will need to use stretcher transportation; HUC to make arrangements. Transport at 1pm. Alexei updated.        LILIYA Ramírez  Care Management

## 2021-03-03 NOTE — PROVIDER NOTIFICATION
S-(situation): LOC     B-(background): COPD    A-(assessment): patient alert and oriented x 1, calm and watching TV, ABG results improved    R-(recommendations): notified MD, BiPAP PRN if changes. Will monitor respiratory status and notify MD of any further changes

## 2021-03-03 NOTE — PROGRESS NOTES
Patient spiked fever and is mildly tachy  Will check CBC, procalcitonin, blood cultures, CXR and lactic acid  Will start iv azithromycin

## 2021-03-05 NOTE — PROGRESS NOTES
[facility]  PRIMARY CARE PROVIDER AND CLINIC:  SUZY Veliz CNP, [address] / [place] [address]  Chief Complaint   Patient presents with     Hospital F/U     [facility] Record Number:  [id number]  Place of Service where encounter took place:  [facility] [place] () [[id number]]    Todd Clifford  is a 86 year old  (8/30/1934), admitted to the above facility from  [facility]. Hospital stay 2/28/21 through 3/3/21..  Admitted to this facility for  hospice.    HPI:    HPI information obtained from: facility chart records, facility staff, patient report and [facility] chart review.   Brief Summary of Hospital Course: Larissa presented to the [facility] with worsening shortness of breath occurring over the past month.  Found to be in respiratory distress with O2 saturations in the upper 80's on RA.    CTA of chest done and showed no PE or other significant findings.  At first thought to be COPD exacerbation but did not respond well to nebulizer, steroids and ABX.  Felt that her COPD was advanced given she had a hx of 2PPD smoking history.    Provider spoke with the daughter and found that her quality of life has been failing.  Larissa has been needing more support for most of her ADLs and showing confusion with anxiety issues from her COPD.  Weight loss of >10% over last few months noted.     Larissa was found to have persistent hypercapnia and hypoxia on blood gases.  ECHO done showed pulmonary hypertension that was likely due to her COPD.  Tachycardic at baseline in the hospital.  Goals of care discussed and hospice was opt for to keep comfortable as she has not responded to many interventions.  Referral made to [facility]/ Hospice.     Updates on Status Since Skilled nursing Admission: met with Larissa today as she was laying in bed.  Staff just finished repositioning her.  Quiet now but staff updating that she has  been pretty active yesterday:      Copied from nursing notes:  Resident is exit seeking and stating that she wants to go home. Resident independent with ambulation in w/c but has not attempted to get on elevator. Resident going into other resident's room thinking it is the door to get out. Redirection not effective. Medication changes done today per Hospice. New order for scheduled and PRN Haldol and Morphine. Medication not here at this time. Medication taken from E-Kit. Administered and will continue to monitor effectiveness.    Hospice has changed medications with nursing.  Larissa did not really contribute to ROS or to an exam.  Was not combative. Said a few words but did not hold a conversation.  Does have trouble hearing as well as vision.     CODE STATUS/ADVANCE DIRECTIVES DISCUSSION:   DNR / DNI  Patient's living condition: lives in a skilled nursing facility  ALLERGIES: Prozac [fluoxetine]  PAST MEDICAL HISTORY:  has a past medical history of Anxiety, Hearing loss, and Hypertension.  PAST SURGICAL HISTORY:   has no past surgical history on file.  FAMILY HISTORY: family history includes C.A.D. in her father and mother; Diabetes in her daughter; Hypertension in her brother, father, and mother.  SOCIAL HISTORY:   reports that she has been smoking cigarettes. She has been smoking about 1.00 pack per day. She has never used smokeless tobacco. She reports that she does not drink alcohol or use drugs.    Post Discharge Medication Reconciliation Status: discharge medications reconciled and changed, per note/orders    Current Outpatient Medications   Medication Sig Dispense Refill     atropine 1 % ophthalmic solution Place 2 drops under the tongue every hour as needed for other (secretions)       haloperidol (HALDOL) 2 MG/ML (HIGH CONC) solution Take 0.25 mLs (0.5 mg) by mouth every 8 hours. May also take 0.5 mLs (1 mg) every 6 hours as needed for agitation.       morphine sulfate HIGH CONCENTRATE (ROXANOL) 10 mg/0.5  "mL (HIGH CONC) solution Place 0.125 mLs (2.5 mg) under the tongue 2 times daily. May also place 0.125 mLs (2.5 mg) every 2 hours as needed for shortness of breath / dyspnea or breakthrough pain. 30 mL 0     albuterol (PROVENTIL) (2.5 MG/3ML) 0.083% neb solution Take 1 vial (2.5 mg) by nebulization every 2 hours as needed for wheezing or shortness of breath / dyspnea       artificial saliva (BIOTENE MT) SOLN solution Take 1 mL (1 spray) by mouth every hour as needed for dry mouth       artificial tears (GENTEAL) 0.1-0.2-0.3 % ophthalmic solution Place 1 drop into both eyes every hour as needed for dry eyes       mineral oil-hydrophilic petrolatum (AQUAPHOR) external ointment Apply topically every 8 hours as needed for dry skin       nicotine (NICODERM CQ) 21 MG/24HR 24 hr patch Place 1 patch onto the skin daily       senna-docusate (SENOKOT-S/PERICOLACE) 8.6-50 MG tablet Take 1 tablet by mouth 2 times daily as needed for constipation       senna-docusate (SENOKOT-S/PERICOLACE) 8.6-50 MG tablet Take 1 tablet by mouth 2 times daily         ROS:  Limited as not saying very much today and noted to be hard for her to hears.    Vitals:  BP (!) 170/90   Pulse 81   Temp 98.2  F (36.8  C)   Resp 26   Ht 1.473 m (4' 10\")   Wt 46.9 kg (103 lb 4.8 oz)   SpO2 (!) 87%   BMI 21.59 kg/m    Exam:  GENERAL APPEARANCE:  petite woman laying in bed.  quiet.  cooperative but unable to hear well  EYES:  Conjunctiva and lids normal  RESP:  no acute respiratory distress.  oxygen at 3L/min.  CV:  Palpation and auscultation of heart done , no edema, heart rate regular and strong.  ABDOMEN:  normal bowel sounds, soft, nontender, no hepatosplenomegaly or other masses  M/S:   Gait and station abnormal assist of staff for transfers, was pulling self around in wheelchair last night.    SKIN:  pale, warm and dry.  PSYCH:  oriented to self, told staff she does not like a lot of visitors. , has been having hallucinations, exit seaking as going " to other's room thinking it was the way out.    Lab/Diagnostic data:    Most Recent 3 CBC's:  Recent Labs   Lab Test 03/02/21  1816 02/28/21  0751 10/11/20  1655   WBC 6.3 6.4 5.1   HGB 12.7 12.0 13.4   * 99 97    229 237     Most Recent 3 BMP's:  Recent Labs   Lab Test 03/03/21  0536 03/02/21  0546 03/01/21  1228 02/28/21  0751 02/28/21  0751   NA  --  140 138  --  138   POTASSIUM 4.4 3.8 3.7   < > 2.8*   CHLORIDE  --  102 100  --  95   CO2  --  35* 35*  --  35*   BUN  --  12 14  --  19   CR  --  0.60 0.56  0.55  --  0.69   ANIONGAP  --  3 3  --  8   PATRICIA  --  8.9 9.3  --  9.3   GLC  --  112* 128*  --  103*    < > = values in this interval not displayed.       ASSESSMENT/PLAN:  Acute and chronic respiratory failure with hypoxia (H)  Chronic obstructive pulmonary disease with acute exacerbation (H)  - currently on hospice due to poor quality of life with advancing COPD.  Came with multiple medications and now list is cleaned up and simplified.  Is on continuous oxygen.  Staff have found her in the 70's on RA when she has taken the tubing out of her nose.    No plan of doing any labs.  Is on Albuterol neb every 2 hours prn.  Does have atropine due to excess secretions.     - atropine 1 % ophthalmic solution; Place 2 drops under the tongue every hour as needed for other (secretions)  - morphine sulfate HIGH CONCENTRATE (ROXANOL) 10 mg/0.5 mL (HIGH CONC) solution; Place 0.125 mLs (2.5 mg) under the tongue 2 times daily. May also place 0.125 mLs (2.5 mg) every 2 hours as needed for shortness of breath / dyspnea or breakthrough pain.       Pulmonary hypertension (H)  So far blood pressures range from 140-170's/70-80's.  No B/P medications as of now.  Has been spitting out medications so just a simple list of medications.  No changes today.    Chronic diastolic heart failure (H)  For now looking/monitoring for active symptoms.  No current medications on board.    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  It was decided to have hospice clean the list up upon their admission for Larissa.  Discontinued all the antipsychotics and now on Haldol.  See below for new order.  No other medications.  Getting to learn Larissa as she is learning the nursing home routine and staff.    - haloperidol (HALDOL) 2 MG/ML (HIGH CONC) solution; Take 0.25 mLs (0.5 mg) by mouth every 8 hours. May also take 0.5 mLs (1 mg) every 6 hours as needed for agitation.    Tobacco use  Remains on Nicotine patch 21mg/24H change patch daily.  Will give orders today to taper her off the patch as well with stepdown orders.    Drug-induced constipation  Is on Senna-S 1 tab twice a day and then as a PRN dosing as well.  BMs will be recorded as she needs assist to use the commode.        Orders written by provider at facility  1.  Clarify Atropine drops:  2 drops to tongue every 2 hours prn for excess secretions.  2.  Nicotine patch 21mg/24H 1 patch daily x4 weeks, then 14mg/24 hours 1 patch daily x4 weeks, then 7mg/24H 1 patch daily for 4 weeks then discontinue - tobacco dependence    Total time spent with patient visit at the skilled nursing facility was 35 min including patient visit and review of past records. Greater than 50% of total time spent with counseling and coordinating care due to reviewing her chart and medications to meet standards for the nursing home, discussion with staff for plan of care and intervention of behaviors.     Electronically signed by:  SUZY Veliz CNP

## 2021-03-05 NOTE — LETTER
3/5/2021        RE: Todd Clifford  [address] [facility] [id number]        [facility]  PRIMARY CARE PROVIDER AND CLINIC:  SUZY Veliz [facility], [address] / [place] [address]  Chief Complaint   Patient presents with     Hospital F/U     [place] [facility] Medical Record Number:  [id number]  Place of Service where encounter took place:  [facility] [place] () [[id number]]    Todd Clifford  is a 86 year old  (8/30/1934), admitted to the above facility from  [facility]. Hospital stay 2/28/21 through 3/3/21..  Admitted to this facility for  hospice.    HPI:    HPI information obtained from: facility chart records, facility staff, patient report and [facility] chart review.   Brief Summary of Hospital Course: Larissa presented to the [facility] with worsening shortness of breath occurring over the past month.  Found to be in respiratory distress with O2 saturations in the upper 80's on RA.    CTA of chest done and showed no PE or other significant findings.  At first thought to be COPD exacerbation but did not respond well to nebulizer, steroids and ABX.  Felt that her COPD was advanced given she had a hx of 2PPD smoking history.    Provider spoke with the daughter and found that her quality of life has been failing.  Larissa has been needing more support for most of her ADLs and showing confusion with anxiety issues from her COPD.  Weight loss of >10% over last few months noted.     Larissa was found to have persistent hypercapnia and hypoxia on blood gases.  ECHO done showed pulmonary hypertension that was likely due to her COPD.  Tachycardic at baseline in the hospital.  Goals of care discussed and hospice was opt for to keep comfortable as she has not responded to many interventions.  Referral made to [facility]/ Hospice.     Updates on Status Since Skilled nursing Admission: met with Larissa today as she was laying in  bed.  Staff just finished repositioning her.  Quiet now but staff updating that she has been pretty active yesterday:      Copied from nursing notes:  Resident is exit seeking and stating that she wants to go home. Resident independent with ambulation in w/c but has not attempted to get on elevator. Resident going into other resident's room thinking it is the door to get out. Redirection not effective. Medication changes done today per Hospice. New order for scheduled and PRN Haldol and Morphine. Medication not here at this time. Medication taken from E-Kit. Administered and will continue to monitor effectiveness.    Hospice has changed medications with nursing.  Larissa did not really contribute to ROS or to an exam.  Was not combative. Said a few words but did not hold a conversation.  Does have trouble hearing as well as vision.     CODE STATUS/ADVANCE DIRECTIVES DISCUSSION:   DNR / DNI  Patient's living condition: lives in a skilled nursing facility  ALLERGIES: Prozac [fluoxetine]  PAST MEDICAL HISTORY:  has a past medical history of Anxiety, Hearing loss, and Hypertension.  PAST SURGICAL HISTORY:   has no past surgical history on file.  FAMILY HISTORY: family history includes C.A.D. in her father and mother; Diabetes in her daughter; Hypertension in her brother, father, and mother.  SOCIAL HISTORY:   reports that she has been smoking cigarettes. She has been smoking about 1.00 pack per day. She has never used smokeless tobacco. She reports that she does not drink alcohol or use drugs.    Post Discharge Medication Reconciliation Status: discharge medications reconciled and changed, per note/orders    Current Outpatient Medications   Medication Sig Dispense Refill     atropine 1 % ophthalmic solution Place 2 drops under the tongue every hour as needed for other (secretions)       haloperidol (HALDOL) 2 MG/ML (HIGH CONC) solution Take 0.25 mLs (0.5 mg) by mouth every 8 hours. May also take 0.5 mLs (1 mg) every 6  "hours as needed for agitation.       morphine sulfate HIGH CONCENTRATE (ROXANOL) 10 mg/0.5 mL (HIGH CONC) solution Place 0.125 mLs (2.5 mg) under the tongue 2 times daily. May also place 0.125 mLs (2.5 mg) every 2 hours as needed for shortness of breath / dyspnea or breakthrough pain. 30 mL 0     albuterol (PROVENTIL) (2.5 MG/3ML) 0.083% neb solution Take 1 vial (2.5 mg) by nebulization every 2 hours as needed for wheezing or shortness of breath / dyspnea       artificial saliva (BIOTENE MT) SOLN solution Take 1 mL (1 spray) by mouth every hour as needed for dry mouth       artificial tears (GENTEAL) 0.1-0.2-0.3 % ophthalmic solution Place 1 drop into both eyes every hour as needed for dry eyes       mineral oil-hydrophilic petrolatum (AQUAPHOR) external ointment Apply topically every 8 hours as needed for dry skin       nicotine (NICODERM CQ) 21 MG/24HR 24 hr patch Place 1 patch onto the skin daily       senna-docusate (SENOKOT-S/PERICOLACE) 8.6-50 MG tablet Take 1 tablet by mouth 2 times daily as needed for constipation       senna-docusate (SENOKOT-S/PERICOLACE) 8.6-50 MG tablet Take 1 tablet by mouth 2 times daily         ROS:  Limited as not saying very much today and noted to be hard for her to hears.    Vitals:  BP (!) 170/90   Pulse 81   Temp 98.2  F (36.8  C)   Resp 26   Ht 1.473 m (4' 10\")   Wt 46.9 kg (103 lb 4.8 oz)   SpO2 (!) 87%   BMI 21.59 kg/m    Exam:  GENERAL APPEARANCE:  petite woman laying in bed.  quiet.  cooperative but unable to hear well  EYES:  Conjunctiva and lids normal  RESP:  no acute respiratory distress.  oxygen at 3L/min.  CV:  Palpation and auscultation of heart done , no edema, heart rate regular and strong.  ABDOMEN:  normal bowel sounds, soft, nontender, no hepatosplenomegaly or other masses  M/S:   Gait and station abnormal assist of staff for transfers, was pulling self around in wheelchair last night.    SKIN:  pale, warm and dry.  PSYCH:  oriented to self, told staff " she does not like a lot of visitors. , has been having hallucinations, exit seaking as going to other's room thinking it was the way out.    Lab/Diagnostic data:    Most Recent 3 CBC's:  Recent Labs   Lab Test 03/02/21  1816 02/28/21  0751 10/11/20  1655   WBC 6.3 6.4 5.1   HGB 12.7 12.0 13.4   * 99 97    229 237     Most Recent 3 BMP's:  Recent Labs   Lab Test 03/03/21  0536 03/02/21  0546 03/01/21  1228 02/28/21  0751 02/28/21  0751   NA  --  140 138  --  138   POTASSIUM 4.4 3.8 3.7   < > 2.8*   CHLORIDE  --  102 100  --  95   CO2  --  35* 35*  --  35*   BUN  --  12 14  --  19   CR  --  0.60 0.56  0.55  --  0.69   ANIONGAP  --  3 3  --  8   PATRICIA  --  8.9 9.3  --  9.3   GLC  --  112* 128*  --  103*    < > = values in this interval not displayed.       ASSESSMENT/PLAN:  Acute and chronic respiratory failure with hypoxia (H)  Chronic obstructive pulmonary disease with acute exacerbation (H)  - currently on hospice due to poor quality of life with advancing COPD.  Came with multiple medications and now list is cleaned up and simplified.  Is on continuous oxygen.  Staff have found her in the 70's on RA when she has taken the tubing out of her nose.    No plan of doing any labs.  Is on Albuterol neb every 2 hours prn.  Does have atropine due to excess secretions.     - atropine 1 % ophthalmic solution; Place 2 drops under the tongue every hour as needed for other (secretions)  - morphine sulfate HIGH CONCENTRATE (ROXANOL) 10 mg/0.5 mL (HIGH CONC) solution; Place 0.125 mLs (2.5 mg) under the tongue 2 times daily. May also place 0.125 mLs (2.5 mg) every 2 hours as needed for shortness of breath / dyspnea or breakthrough pain.       Pulmonary hypertension (H)  So far blood pressures range from 140-170's/70-80's.  No B/P medications as of now.  Has been spitting out medications so just a simple list of medications.  No changes today.    Chronic diastolic heart failure (H)  For now looking/monitoring for active  symptoms.  No current medications on board.    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  It was decided to have hospice clean the list up upon their admission for Larissa.  Discontinued all the antipsychotics and now on Haldol.  See below for new order.  No other medications.  Getting to learn Larissa as she is learning the nursing home routine and staff.    - haloperidol (HALDOL) 2 MG/ML (HIGH CONC) solution; Take 0.25 mLs (0.5 mg) by mouth every 8 hours. May also take 0.5 mLs (1 mg) every 6 hours as needed for agitation.    Tobacco use  Remains on Nicotine patch 21mg/24H change patch daily.  Will give orders today to taper her off the patch as well with stepdown orders.    Drug-induced constipation  Is on Senna-S 1 tab twice a day and then as a PRN dosing as well.  BMs will be recorded as she needs assist to use the commode.        Orders written by provider at facility  1.  Clarify Atropine drops:  2 drops to tongue every 2 hours prn for excess secretions.  2.  Nicotine patch 21mg/24H 1 patch daily x4 weeks, then 14mg/24 hours 1 patch daily x4 weeks, then 7mg/24H 1 patch daily for 4 weeks then discontinue - tobacco dependence    Total time spent with patient visit at the skilled nursing facility was 35 min including patient visit and review of past records. Greater than 50% of total time spent with counseling and coordinating care due to reviewing her chart and medications to meet standards for the nursing home, discussion with staff for plan of care and intervention of behaviors.     Electronically signed by:  SUZY Veliz CNP                           Sincerely,        SUZY Veliz CNP

## 2021-03-12 NOTE — PROGRESS NOTES
[place] [facility]  [facility] Record Number:  [id number]  Place of Service where encounter took place:  [facility] () [[id number]]  Chief Complaint   Patient presents with     Nursing Home Acute       HPI:    Todd Clifford  is a 86 year old (8/30/1934), who is being seen today for an episodic care visit.  HPI information obtained from: facility chart records, facility staff and hospice consult. Today's concern is:    1. Acute on chronic respiratory failure with hypoxia (H)    2. Chronic obstructive pulmonary disease with acute exacerbation (H)    3. Hospice care patient    4. Bilateral lower extremity edema      Hospice nurse visiting with resident today and then came to talk with the  of the floor but this NP happened to be present and so able to discuss plan of care and place orders.  Hospice nurse sees Nancy having more fluid in her legs and congested breathing.  Asking to place her on prednisone therapy as well as start Lasix with goal to help edema but her breathing as well.    Went in to see Nan and staff just helped her onto the commode.  Nan is very hard of hearing and so made presence known but did not attempt to get her to understand the changes.  Using the pocket talker has been helpful and so can communicate that way.    Past Medical and Surgical History reviewed in Epic today.    MEDICATIONS:    Current Outpatient Medications   Medication Sig Dispense Refill     acetaminophen (TYLENOL) 325 MG tablet Take 2 tablets (650 mg) by mouth every 6 hours as needed for mild pain or fever (temperature over 100  F )       albuterol (PROVENTIL) (2.5 MG/3ML) 0.083% neb solution Take 1 vial (2.5 mg) by nebulization every 2 hours as needed for wheezing or shortness of breath / dyspnea       ALPRAZolam (XANAX) 0.25 MG tablet Take 1 tablet (0.25 mg) by mouth 3 times daily as needed for anxiety 15 tablet 0     amitriptyline (ELAVIL) 25 MG tablet Take 1 tablet (25 mg)  "by mouth 3 times daily 270 tablet 3     artificial saliva (BIOTENE MT) SOLN solution Take 1 mL (1 spray) by mouth every hour as needed for dry mouth       artificial tears (GENTEAL) 0.1-0.2-0.3 % ophthalmic solution Place 1 drop into both eyes every hour as needed for dry eyes       atropine 1 % ophthalmic solution Place 1-2 drops under the tongue every hour as needed for other (secretions)       cloNIDine (CATAPRES) 0.1 MG tablet Take 1 tablet (0.1 mg) by mouth daily 90 tablet 1     glycopyrrolate (GLYCATE) 2 MG tablet Take 1-2 tablets (2-4 mg) by mouth every 4 hours as needed (secretions, may also be given via G-tube or J-tube if needed.)       lisinopril (ZESTRIL) 30 MG tablet Take 1 tablet (30 mg) by mouth daily 90 tablet 1     mineral oil-hydrophilic petrolatum (AQUAPHOR) external ointment Apply topically every 8 hours as needed for dry skin       morphine 10 MG/5ML solution Take 1.25 mLs (2.5 mg) by mouth every 6 hours as needed for moderate to severe pain (or dyspnea) 30 mL 0     nicotine (NICODERM CQ) 21 MG/24HR 24 hr patch Place 1 patch onto the skin daily       ondansetron (ZOFRAN-ODT) 4 MG ODT tab Take 1 tablet (4 mg) by mouth every 6 hours as needed for nausea or vomiting       polyethylene glycol (MIRALAX) 17 g packet Take 17 g by mouth daily as needed for constipation       prochlorperazine (COMPAZINE) 5 MG tablet Take 1 tablet (5 mg) by mouth every 6 hours as needed for vomiting       senna-docusate (SENOKOT-S/PERICOLACE) 8.6-50 MG tablet Take 1 tablet by mouth 2 times daily as needed for constipation       senna-docusate (SENOKOT-S/PERICOLACE) 8.6-50 MG tablet Take 1 tablet by mouth 2 times daily         REVIEW OF SYSTEMS:  Unable as hard of hearing but also process of using commode.    Objective:  /58   Pulse 76   Temp 98.3  F (36.8  C)   Resp 17   Ht 1.473 m (4' 10\")   Wt 45.1 kg (99 lb 8 oz)   SpO2 95%   BMI 20.80 kg/m    Exam:  Back was towards this NP.    Skin was olive color, dry " and warm to touch on her back.  Congested cough heard.  Breathing was heavy and loud.  Staff do assist with transfers and then w/c used for distances.  Left her alone with the aide at that point.    Labs:   N/A due to being on hospice.    ASSESSMENT/PLAN:  Acute on chronic respiratory failure with hypoxia (H)  Chronic obstructive pulmonary disease with acute exacerbation (H)  Hospice care patient  - goal is comfort.  Per suggestion of hospice nurse, will do prednisone 10mg daily for 5 days then 5mg daily with no stop date.    Hopefully that will ease her breathing.    Bilateral lower extremity edema  Will start Lasix 20mg po daily for the edema in legs per request of hospice nurse.  Hopefully this can help with her breathing as well.      Orders written by provider at facility  1.  Lasix 20mg po daily for lower extremity edema and chronic respiratory failure  2.  Prednisone 10mg daily for 5 days and then down to 5mg po daily for COPD    Total time spent with patient visit at the [facility] nursing [facility] was 15 min including patient visit, review of past records and discussion with hospice nurse and  nurse of floor. Greater than 50% of total time spent with counseling and coordinating care due to acute changes seen in her breathing as well as fluid retention in her legs.  Electronically signed by:  SUZY Veliz CNP

## 2021-03-12 NOTE — LETTER
3/12/2021        RE: Todd lCifford  [address] [address]        [place] [facility]  [facility] Record Number:  [id number]  Place of Service where encounter took place:  [facility] () [[id number]]  Chief Complaint   Patient presents with     Nursing Home Acute       HPI:    Todd Clifford  is a 86 year old (8/30/1934), who is being seen today for an episodic care visit.  HPI information obtained from: facility chart records, facility staff and hospice consult. Today's concern is:    1. Acute on chronic respiratory failure with hypoxia (H)    2. Chronic obstructive pulmonary disease with acute exacerbation (H)    3. Hospice care patient    4. Bilateral lower extremity edema      Hospice nurse visiting with resident today and then came to talk with the  of the floor but this NP happened to be present and so able to discuss plan of care and place orders.  Hospice nurse sees Nancy having more fluid in her legs and congested breathing.  Asking to place her on prednisone therapy as well as start Lasix with goal to help edema but her breathing as well.    Went in to see aNn and staff just helped her onto the commode.  Nan is very hard of hearing and so made presence known but did not attempt to get her to understand the changes.  Using the pocket talker has been helpful and so can communicate that way.    Past Medical and Surgical History reviewed in Epic today.    MEDICATIONS:    Current Outpatient Medications   Medication Sig Dispense Refill     acetaminophen (TYLENOL) 325 MG tablet Take 2 tablets (650 mg) by mouth every 6 hours as needed for mild pain or fever (temperature over 100  F )       albuterol (PROVENTIL) (2.5 MG/3ML) 0.083% neb solution Take 1 vial (2.5 mg) by nebulization every 2 hours as needed for wheezing or shortness of breath / dyspnea       ALPRAZolam (XANAX) 0.25 MG tablet Take 1 tablet (0.25 mg) by mouth 3 times daily as  "needed for anxiety 15 tablet 0     amitriptyline (ELAVIL) 25 MG tablet Take 1 tablet (25 mg) by mouth 3 times daily 270 tablet 3     artificial saliva (BIOTENE MT) SOLN solution Take 1 mL (1 spray) by mouth every hour as needed for dry mouth       artificial tears (GENTEAL) 0.1-0.2-0.3 % ophthalmic solution Place 1 drop into both eyes every hour as needed for dry eyes       atropine 1 % ophthalmic solution Place 1-2 drops under the tongue every hour as needed for other (secretions)       cloNIDine (CATAPRES) 0.1 MG tablet Take 1 tablet (0.1 mg) by mouth daily 90 tablet 1     glycopyrrolate (GLYCATE) 2 MG tablet Take 1-2 tablets (2-4 mg) by mouth every 4 hours as needed (secretions, may also be given via G-tube or J-tube if needed.)       lisinopril (ZESTRIL) 30 MG tablet Take 1 tablet (30 mg) by mouth daily 90 tablet 1     mineral oil-hydrophilic petrolatum (AQUAPHOR) external ointment Apply topically every 8 hours as needed for dry skin       morphine 10 MG/5ML solution Take 1.25 mLs (2.5 mg) by mouth every 6 hours as needed for moderate to severe pain (or dyspnea) 30 mL 0     nicotine (NICODERM CQ) 21 MG/24HR 24 hr patch Place 1 patch onto the skin daily       ondansetron (ZOFRAN-ODT) 4 MG ODT tab Take 1 tablet (4 mg) by mouth every 6 hours as needed for nausea or vomiting       polyethylene glycol (MIRALAX) 17 g packet Take 17 g by mouth daily as needed for constipation       prochlorperazine (COMPAZINE) 5 MG tablet Take 1 tablet (5 mg) by mouth every 6 hours as needed for vomiting       senna-docusate (SENOKOT-S/PERICOLACE) 8.6-50 MG tablet Take 1 tablet by mouth 2 times daily as needed for constipation       senna-docusate (SENOKOT-S/PERICOLACE) 8.6-50 MG tablet Take 1 tablet by mouth 2 times daily         REVIEW OF SYSTEMS:  Unable as hard of hearing but also process of using commode.    Objective:  /58   Pulse 76   Temp 98.3  F (36.8  C)   Resp 17   Ht 1.473 m (4' 10\")   Wt 45.1 kg (99 lb 8 oz)   " SpO2 95%   BMI 20.80 kg/m    Exam:  Back was towards this NP.    Skin was olive color, dry and warm to touch on her back.  Congested cough heard.  Breathing was heavy and loud.  Staff do assist with transfers and then w/c used for distances.  Left her alone with the aide at that point.    Labs:   N/A due to being on hospice.    ASSESSMENT/PLAN:  Acute on chronic respiratory failure with hypoxia (H)  Chronic obstructive pulmonary disease with acute exacerbation (H)  Hospice care patient  - goal is comfort.  Per suggestion of hospice nurse, will do prednisone 10mg daily for 5 days then 5mg daily with no stop date.    Hopefully that will ease her breathing.    Bilateral lower extremity edema  Will start Lasix 20mg po daily for the edema in legs per request of hospice nurse.  Hopefully this can help with her breathing as well.      Orders written by provider at facility  1.  Lasix 20mg po daily for lower extremity edema and chronic respiratory failure  2.  Prednisone 10mg daily for 5 days and then down to 5mg po daily for COPD    Total time spent with patient visit at the [facility] nursing facility was 15 min including patient visit, review of past records and discussion with hospice nurse and  nurse of floor. Greater than 50% of total time spent with counseling and coordinating care due to acute changes seen in her breathing as well as fluid retention in her legs.  Electronically signed by:  SUZY Veliz CNP                 Sincerely,        SUZY Veliz CNP

## 2021-03-16 PROBLEM — J44.9 COPD (CHRONIC OBSTRUCTIVE PULMONARY DISEASE) (H): Status: ACTIVE | Noted: 2021-01-01

## 2021-03-17 NOTE — PROGRESS NOTES
" [place] [facility]  Todd Clifford is being evaluated via a billable video.   The patient has been notified of following:  \"This video visit will be conducted via a call between you and your provider. We have found that certain health care needs can be provided without the need for an in-person physical exam.  This service lets us provide the care you need with a video conversation. If during the course of the call the provider feels a video visit is not appropriate, you will not be charged for this service.\"   The provider has received verbal consent for a Video Visit from the patient and or first contact? Yes  Patient/facility staff would like the video invitation sent by: N/A   Video Start Time: 14:31  Which Facility the Patient is at during the time of visit: [facility]   Received verbal consent to use Care Everywhere in order to access labs, documents, histories, and all other needed information to provide care at current facility.  PRIMARY CARE PROVIDER AND CLINIC:  SUZY Veliz [facility], [address] / [facility] [address]  Chief Complaint   Patient presents with     Establish Care     Video Visit     [facility] Medical Record Number:  [id number]  Todd Clifford  is a 86 year old  (8/30/1934), admitted to the above facility from  [facility]. Hospital stay 2/28/21 through 3/3/21..  Admitted to this facility for  hospice.  HPI:    HPI information obtained from: facility staff, patient report, [facility] chart review, Care Everywhere Epic chart review and GNP.   Brief Summary of Hospital Course:   - Patient with PMH pertinent for COPD smoking,  with progressive shortness of breath the hospital was found to be hypoxic, PE ruled out, felt to have COPD exacerbation but did not respond to med/antibiotic echo revealed pulmonary hypertension.  Given patient is AFTT, enrolled in hospice.       Today:  - Pt seen in the presence of GNP who graciously " assisted with the virtual visit  - Pt reports not feeling well, hard to breath. RN reports had two doses of morphine and ativan so far today. Hospice scheduled morphine 5 mg tid, and prn continued. RN reports pt this am was agitated and tried to get up from the WC, was given one dose of 0.5 mg of Haldol with good effect.   - RN reports pt eats about 50% of the meal, and sleeps most of the time.       CODE STATUS/ADVANCE DIRECTIVES DISCUSSION:   DNR / DNI  Patient's living condition: lives in a skilled nursing facility  ALLERGIES: Prozac [fluoxetine]  PAST MEDICAL HISTORY:  has a past medical history of Anxiety, Hearing loss, and Hypertension. copd  PAST SURGICAL HISTORY:  Hip surgery  FAMILY HISTORY: family history includes C.A.D. in her father and mother; Diabetes in her daughter; Hypertension in her brother, father, and mother.  SOCIAL HISTORY:   reports that she has been smoking cigarettes. She has been smoking about 1.00 pack per day. She has never used smokeless tobacco. She reports that she does not drink alcohol or use drugs.  Current Outpatient Medications   Medication Sig Dispense Refill     albuterol (PROVENTIL) (2.5 MG/3ML) 0.083% neb solution Take 1 vial (2.5 mg) by nebulization every 2 hours as needed for wheezing or shortness of breath / dyspnea       artificial saliva (BIOTENE MT) SOLN solution Take 1 mL (1 spray) by mouth every hour as needed for dry mouth       artificial tears (GENTEAL) 0.1-0.2-0.3 % ophthalmic solution Place 1 drop into both eyes every hour as needed for dry eyes       atropine 1 % ophthalmic solution Place 2 drops under the tongue every hour as needed for other (secretions)       haloperidol (HALDOL) 2 MG/ML (HIGH CONC) solution Take 0.25 mLs (0.5 mg) by mouth every 8 hours. May also take 0.5 mLs (1 mg) every 6 hours as needed for agitation.       mineral oil-hydrophilic petrolatum (AQUAPHOR) external ointment Apply topically every 8 hours as needed for dry skin       morphine  "sulfate HIGH CONCENTRATE (ROXANOL) 10 mg/0.5 mL (HIGH CONC) solution Place 0.125 mLs (2.5 mg) under the tongue 2 times daily. May also place 0.125 mLs (2.5 mg) every 2 hours as needed for shortness of breath / dyspnea or breakthrough pain. 30 mL 0     nicotine (NICODERM CQ) 21 MG/24HR 24 hr patch Place 1 patch onto the skin daily       senna-docusate (SENOKOT-S/PERICOLACE) 8.6-50 MG tablet Take 1 tablet by mouth 2 times daily as needed for constipation       senna-docusate (SENOKOT-S/PERICOLACE) 8.6-50 MG tablet Take 1 tablet by mouth 2 times daily        discharge medications reconciled and changed, per note/orders    ROS: Unobtainable secondary to medical conditions  Vitals:/58   Pulse 76   Temp 98.1  F (36.7  C)   Resp 17   Ht 1.473 m (4' 10\")   Wt 44.1 kg (97 lb 4.8 oz)   SpO2 93%   BMI 20.34 kg/m     Limited Visit Exam done given COVID-19 precautions:  GENERAL APPEARANCE:  Sitting up in the WC, very tired looking.   ENT: [place]. NC in place, some drooling.   RESP:  Some labored breathing.   M/S:   no joint deformity noted  SKIN:  no rash noted  NEURO:   no purposeful movement in upper and lower extremities  PSYCH:  affect and mood flat. Barely verbal.       Lab/Diagnostic data: hospital lab reviewed.       ASSESSMENT/PLAN:  ------------------------------  AFTT  Debility  PCM, moderate (H):  - Significant  Deficits requiring NH placement. Requiring extensive assistance from nursing. Up for meals only o/w spends the day resting in bed  - supplement  - Body mass index is 20.34 kg/m .      Chronic diastolic heart failure (H)  Pulmonary hypertension (H)  - contributing to terminal illness.   - diuretic prn, for comfort.     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 Managed with med.       COPD (H)  Chronic respiratory failure (H)  Hospice care  - " Appreciate collaboration with  hospice team for symptom management in collaboration with cares for maximum comfort at end-of-life  - continue comfort care measures.     ORDER: See above, otherwise, continue the rest of the current POC.       Electronically signed by:  Pamela Garner MD     Video-Visit Details  Type of service:  Video Visit  Video End Time (time video stopped): 14:34  Distant Location (provider location):  [facility]

## 2021-03-18 NOTE — LETTER
"    3/18/2021        RE: Todd Clifford  [address] [id number]         [place] [facility]  Todd Clifford is being evaluated via a billable video.   The patient has been notified of following:  \"This video visit will be conducted via a call between you and your provider. We have found that certain health care needs can be provided without the need for an in-person physical exam.  This service lets us provide the care you need with a video conversation. If during the course of the call the provider feels a video visit is not appropriate, you will not be charged for this service.\"   The provider has received verbal consent for a Video Visit from the patient and or first contact? Yes  Patient/facility staff would like the video invitation sent by: N/A   Video Start Time: 14:31  Which Facility the Patient is at during the time of visit: [facility]   Received verbal consent to use Care Everywhere in order to access labs, documents, histories, and all other needed information to provide care at current facility.  PRIMARY CARE PROVIDER AND CLINIC:  SUZY Veliz [facility], [address] / [facility] [address]  Chief Complaint   Patient presents with     Establish Care     Video Visit     [place] Medical Record Number:  [id number]  Todd Clifford  is a 86 year old  (8/30/1934), admitted to the above facility from  [facility]. Hospital stay 2/28/21 through 3/3/21..  Admitted to this facility for  hospice.  HPI:    HPI information obtained from: facility staff, patient report, [facility] chart review, Care Everywhere Epic chart review and GNP.   Brief Summary of Hospital Course:   - Patient with PMH pertinent for COPD smoking,  with progressive shortness of breath the hospital was found to be hypoxic, PE ruled out, felt to have COPD exacerbation but did not respond to med/antibiotic echo revealed pulmonary hypertension.  Given patient is " FRANCISCO JAVIER, enrolled in hospice.       Today:  - Pt seen in the presence of GNP who graciously assisted with the virtual visit  - Pt reports not feeling well, hard to breath. RN reports had two doses of morphine and ativan so far today. Hospice scheduled morphine 5 mg tid, and prn continued. RN reports pt this am was agitated and tried to get up from the WC, was given one dose of 0.5 mg of Haldol with good effect.   - RN reports pt eats about 50% of the meal, and sleeps most of the time.       CODE STATUS/ADVANCE DIRECTIVES DISCUSSION:   DNR / DNI  Patient's living condition: lives in a skilled nursing facility  ALLERGIES: Prozac [fluoxetine]  PAST MEDICAL HISTORY:  has a past medical history of Anxiety, Hearing loss, and Hypertension. copd  PAST SURGICAL HISTORY:  Hip surgery  FAMILY HISTORY: family history includes C.A.D. in her father and mother; Diabetes in her daughter; Hypertension in her brother, father, and mother.  SOCIAL HISTORY:   reports that she has been smoking cigarettes. She has been smoking about 1.00 pack per day. She has never used smokeless tobacco. She reports that she does not drink alcohol or use drugs.  Current Outpatient Medications   Medication Sig Dispense Refill     albuterol (PROVENTIL) (2.5 MG/3ML) 0.083% neb solution Take 1 vial (2.5 mg) by nebulization every 2 hours as needed for wheezing or shortness of breath / dyspnea       artificial saliva (BIOTENE MT) SOLN solution Take 1 mL (1 spray) by mouth every hour as needed for dry mouth       artificial tears (GENTEAL) 0.1-0.2-0.3 % ophthalmic solution Place 1 drop into both eyes every hour as needed for dry eyes       atropine 1 % ophthalmic solution Place 2 drops under the tongue every hour as needed for other (secretions)       haloperidol (HALDOL) 2 MG/ML (HIGH CONC) solution Take 0.25 mLs (0.5 mg) by mouth every 8 hours. May also take 0.5 mLs (1 mg) every 6 hours as needed for agitation.       mineral oil-hydrophilic petrolatum (AQUAPHOR)  "external ointment Apply topically every 8 hours as needed for dry skin       morphine sulfate HIGH CONCENTRATE (ROXANOL) 10 mg/0.5 mL (HIGH CONC) solution Place 0.125 mLs (2.5 mg) under the tongue 2 times daily. May also place 0.125 mLs (2.5 mg) every 2 hours as needed for shortness of breath / dyspnea or breakthrough pain. 30 mL 0     nicotine (NICODERM CQ) 21 MG/24HR 24 hr patch Place 1 patch onto the skin daily       senna-docusate (SENOKOT-S/PERICOLACE) 8.6-50 MG tablet Take 1 tablet by mouth 2 times daily as needed for constipation       senna-docusate (SENOKOT-S/PERICOLACE) 8.6-50 MG tablet Take 1 tablet by mouth 2 times daily        discharge medications reconciled and changed, per note/orders    ROS: Unobtainable secondary to medical conditions  Vitals:/58   Pulse 76   Temp 98.1  F (36.7  C)   Resp 17   Ht 1.473 m (4' 10\")   Wt 44.1 kg (97 lb 4.8 oz)   SpO2 93%   BMI 20.34 kg/m     Limited Visit Exam done given COVID-19 precautions:  GENERAL APPEARANCE:  Sitting up in the WC, very tired looking.   ENT: [place]. NC in place, some drooling.   RESP:  Some labored breathing.   M/S:   no joint deformity noted  SKIN:  no rash noted  NEURO:   no purposeful movement in upper and lower extremities  PSYCH:  affect and mood flat. Barely verbal.       Lab/Diagnostic data: hospital lab reviewed.       ASSESSMENT/PLAN:  ------------------------------  AFTT  Debility  PCM, moderate (H):  - Significant  Deficits requiring NH placement. Requiring extensive assistance from nursing. Up for meals only o/w spends the day resting in bed  - supplement  - Body mass index is 20.34 kg/m .      Chronic diastolic heart failure (H)  Pulmonary hypertension (H)  - contributing to terminal illness.   - diuretic prn, for comfort.     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 Managed with med.       COPD (H)  Chronic respiratory failure (H)  Hospice care  - Appreciate collaboration with  hospice team for symptom management in collaboration with cares for maximum comfort at end-of-life  - continue comfort care measures.     ORDER: See above, otherwise, continue the rest of the current POC.       Electronically signed by:  Pamela Garner MD     Video-Visit Details  Type of service:  Video Visit  Video End Time (time video stopped): 14:34  Distant Location (provider location):  [place] [facility]                   Sincerely,        Pamela Garner MD

## 2021-03-22 NOTE — PROGRESS NOTES
[place] [facility]  [place] Medical Record Number:  [id number]  Place of Service where encounter took place:  [facility] () [[id number]]  Chief Complaint   Patient presents with     RECHECK       HPI:    Todd Clifford  is a 86 year old (8/30/1934), who is being seen today for an episodic care visit.  HPI information obtained from: facility chart records, facility staff, patient report and hospice nurse. Today's concern is:    1. Chronic obstructive pulmonary disease with acute exacerbation (H)    2. Bilateral lower extremity edema    3. KIRILL (generalized anxiety disorder)      Came to follow up with Nancy today as hospice nurse is present.  Different changes to her medication regimen are being done but one item asked is to raise the Lasix to 40mg daily with hopes of helping her breathing and lower legs with the edema.    Staff have Todd up in the wheelchair and by the nursing desk.  It is nice to see her out of her room.  She is wearing the pocket talker and able to communicate with staff better.  So, so hard of hearing.  No acute distress at the moment.  Sitting up nicely in the wheelchair but staff bring her back to her room to lay down per her request.      Past Medical and Surgical History reviewed in Epic today.    MEDICATIONS:    Current Outpatient Medications   Medication Sig Dispense Refill     albuterol (PROVENTIL) (2.5 MG/3ML) 0.083% neb solution Take 1 vial (2.5 mg) by nebulization every 2 hours as needed for wheezing or shortness of breath / dyspnea       artificial saliva (BIOTENE MT) SOLN solution Take 1 mL (1 spray) by mouth every hour as needed for dry mouth       artificial tears (GENTEAL) 0.1-0.2-0.3 % ophthalmic solution Place 1 drop into both eyes every hour as needed for dry eyes       atropine 1 % ophthalmic solution Place 2 drops under the tongue every hour as needed for other (secretions)       mineral oil-hydrophilic petrolatum (AQUAPHOR) external  "ointment Apply topically every 8 hours as needed for dry skin      Furosemide 40mg 1 tablet po daily      Lorazepam 2mg/ml intensol 0.25mg (0.125ml) SL every 4 hours prn       nicotine (NICODERM CQ) 21 MG/24HR 24 hr patch Place 1 patch onto the skin daily       senna-docusate (SENOKOT-S/PERICOLACE) 8.6-50 MG tablet Take 1 tablet by mouth 2 times daily as needed for constipation       senna-docusate (SENOKOT-S/PERICOLACE) 8.6-50 MG tablet Take 1 tablet by mouth 2 times daily         REVIEW OF SYSTEMS:  Limited to being hard of hearing but quietly answers questions.  No acute pain, breathing difficulty is ongoing.      Objective:  /58   Pulse 95   Temp 97.5  F (36.4  C)   Resp 17   Wt 45.5 kg (100 lb 6.4 oz)   SpO2 94%   BMI 20.98 kg/m    Exam:  Petite female in wheelchair.  No acute distress as she holds onto the headphones of the pocket talker.  Skin is pale, frail, dry and warm.  Radial pulse is regular.  Oxygen at 3L/min via NC and breathing sounds heavy like she is congested in the nasal passages.  +2 edema pitting in lower legs.  No compression stockings worn.    Staff report she will self transfer at times.  She is a \"busy\" person and so staff keep a close eye on her for safety.    Labs:   No labs since admission    ASSESSMENT/PLAN:  Chronic obstructive pulmonary disease with acute exacerbation (H)  Bilateral lower extremity edema  - for the purpose of both diagnoses, and request of the hospice nurse from [facility], will write the order to increase the Lasix to 40mg po daily.  No labs ordered due to being on hospice.  Goal is comfort.  Has morphine and lorazepam on board.    KIRILL (generalized anxiety disorder)  In looking at all the orders, do not see a need to be on PRN haldol when having PRN Lorazepam.  Would rather adjust the dose of the Lorazepam if needed.  Only item is that it needs a stop date present.    Will ask staff to attach x6 months to the order even though she will not be here in six " months.  Feel she is progressing through her life at a steady pace.        Orders written by provider at facility  1.  Increase Lasix to 40mg po daily - edema and COPD exacerbation  2.  Discontinue PRN Haldol  3.  Add x6 months to PRN Lorazepam order.    Total time spent with patient visit at the [facility] nursing facility was 15 min including patient visit and review of past records. Greater than 50% of total time spent with counseling and coordinating care due to acute disease management to keep comfortable.  .     Electronically signed by:  SUZY Veliz CNP

## 2021-03-22 NOTE — LETTER
3/22/2021        RE: Todd Clifford  [address] [id number]        [place] [facility]  [employer] Medical Record Number:  [id number]  Place of Service where encounter took place:  [facility] () [[id number]]  Chief Complaint   Patient presents with     RECHECK       HPI:    Todd Clifford  is a 86 year old (8/30/1934), who is being seen today for an episodic care visit.  HPI information obtained from: facility chart records, facility staff, patient report and hospice nurse. Today's concern is:    1. Chronic obstructive pulmonary disease with acute exacerbation (H)    2. Bilateral lower extremity edema    3. KIRILL (generalized anxiety disorder)      Came to follow up with Nancy today as hospice nurse is present.  Different changes to her medication regimen are being done but one item asked is to raise the Lasix to 40mg daily with hopes of helping her breathing and lower legs with the edema.    Staff have Todd up in the wheelchair and by the nursing desk.  It is nice to see her out of her room.  She is wearing the pocket talker and able to communicate with staff better.  So, so hard of hearing.  No acute distress at the moment.  Sitting up nicely in the wheelchair but staff bring her back to her room to lay down per her request.      Past Medical and Surgical History reviewed in Epic today.    MEDICATIONS:    Current Outpatient Medications   Medication Sig Dispense Refill     albuterol (PROVENTIL) (2.5 MG/3ML) 0.083% neb solution Take 1 vial (2.5 mg) by nebulization every 2 hours as needed for wheezing or shortness of breath / dyspnea       artificial saliva (BIOTENE MT) SOLN solution Take 1 mL (1 spray) by mouth every hour as needed for dry mouth       artificial tears (GENTEAL) 0.1-0.2-0.3 % ophthalmic solution Place 1 drop into both eyes every hour as needed for dry eyes       atropine 1 % ophthalmic solution Place 2 drops under the tongue every hour as needed  "for other (secretions)       mineral oil-hydrophilic petrolatum (AQUAPHOR) external ointment Apply topically every 8 hours as needed for dry skin      Furosemide 40mg 1 tablet po daily      Lorazepam 2mg/ml intensol 0.25mg (0.125ml) SL every 4 hours prn       nicotine (NICODERM CQ) 21 MG/24HR 24 hr patch Place 1 patch onto the skin daily       senna-docusate (SENOKOT-S/PERICOLACE) 8.6-50 MG tablet Take 1 tablet by mouth 2 times daily as needed for constipation       senna-docusate (SENOKOT-S/PERICOLACE) 8.6-50 MG tablet Take 1 tablet by mouth 2 times daily         REVIEW OF SYSTEMS:  Limited to being hard of hearing but quietly answers questions.  No acute pain, breathing difficulty is ongoing.      Objective:  /58   Pulse 95   Temp 97.5  F (36.4  C)   Resp 17   Wt 45.5 kg (100 lb 6.4 oz)   SpO2 94%   BMI 20.98 kg/m    Exam:  Petite female in wheelchair.  No acute distress as she holds onto the headphones of the pocket talker.  Skin is pale, frail, dry and warm.  Radial pulse is regular.  Oxygen at 3L/min via NC and breathing sounds heavy like she is congested in the nasal passages.  +2 edema pitting in lower legs.  No compression stockings worn.    Staff report she will self transfer at times.  She is a \"busy\" person and so staff keep a close eye on her for safety.    Labs:   No labs since admission    ASSESSMENT/PLAN:  Chronic obstructive pulmonary disease with acute exacerbation (H)  Bilateral lower extremity edema  - for the purpose of both diagnoses, and request of the hospice nurse from [facility], will write the order to increase the Lasix to 40mg po daily.  No labs ordered due to being on hospice.  Goal is comfort.  Has morphine and lorazepam on board.    KIRILL (generalized anxiety disorder)  In looking at all the orders, do not see a need to be on PRN haldol when having PRN Lorazepam.  Would rather adjust the dose of the Lorazepam if needed.  Only item is that it needs a stop date present.    Will " ask staff to attach x6 months to the order even though she will not be here in six months.  Feel she is progressing through her life at a steady pace.        Orders written by provider at facility  1.  Increase Lasix to 40mg po daily - edema and COPD exacerbation  2.  Discontinue PRN Haldol  3.  Add x6 months to PRN Lorazepam order.    Total time spent with patient visit at the [facility] nursing facility was 15 min including patient visit and review of past records. Greater than 50% of total time spent with counseling and coordinating care due to acute disease management to keep comfortable.  .     Electronically signed by:  SUZY Veliz CNP                 Sincerely,        SUZY Veliz CNP

## 2021-03-24 NOTE — TELEPHONE ENCOUNTER
Resident has a change in VS and so thinking she has entered the dying process.    Hospice nurse changing frequency of Haldol, Lorazepam and Morphine.    New scripts sent for Roxanol and Lorazepam.    Electronically signed by Cecy De La Torre RN, CNP

## 2022-08-18 NOTE — TELEPHONE ENCOUNTER
Please call patient and then also call Sinai Guerin (patient's niece) Sinai Guerin - [phone]    - X-rays show worsening of compression fractures and new fractures   - I left a message for Dr. Roldan and Itzel (her neurosurgery team) to see if there is any alternatives for her and to discuss braces.     Eduard Carrington PA-C  [facility]      normal/soft/nontender/nondistended/normal active bowel sounds